# Patient Record
Sex: MALE | Race: WHITE | NOT HISPANIC OR LATINO | URBAN - METROPOLITAN AREA
[De-identification: names, ages, dates, MRNs, and addresses within clinical notes are randomized per-mention and may not be internally consistent; named-entity substitution may affect disease eponyms.]

---

## 2017-03-28 PROBLEM — Z00.00 ENCOUNTER FOR PREVENTIVE HEALTH EXAMINATION: Status: ACTIVE | Noted: 2017-03-28

## 2017-05-05 ENCOUNTER — OUTPATIENT (OUTPATIENT)
Dept: OUTPATIENT SERVICES | Facility: HOSPITAL | Age: 62
LOS: 1 days | Discharge: HOME | End: 2017-05-05

## 2017-05-07 ENCOUNTER — INPATIENT (INPATIENT)
Facility: HOSPITAL | Age: 62
LOS: 11 days | Discharge: NURSING, REHAB OR LONG-TERM CARE | End: 2017-05-19
Attending: INTERNAL MEDICINE | Admitting: INTERNAL MEDICINE
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
## 2017-05-07 DIAGNOSIS — T82.03XA LEAKAGE OF HEART VALVE PROSTHESIS, INITIAL ENCOUNTER: ICD-10-CM

## 2017-06-02 ENCOUNTER — INPATIENT (INPATIENT)
Facility: HOSPITAL | Age: 62
LOS: 5 days | Discharge: HOME HEALTH | DRG: 219 | End: 2017-06-08
Attending: INTERNAL MEDICINE | Admitting: INTERNAL MEDICINE
Payer: MEDICARE

## 2017-06-02 VITALS
TEMPERATURE: 98 F | HEART RATE: 63 BPM | WEIGHT: 142.2 LBS | DIASTOLIC BLOOD PRESSURE: 58 MMHG | SYSTOLIC BLOOD PRESSURE: 106 MMHG | OXYGEN SATURATION: 100 % | RESPIRATION RATE: 18 BRPM

## 2017-06-02 RX ORDER — SODIUM CHLORIDE 9 MG/ML
3 INJECTION INTRAMUSCULAR; INTRAVENOUS; SUBCUTANEOUS EVERY 8 HOURS
Qty: 0 | Refills: 0 | Status: DISCONTINUED | OUTPATIENT
Start: 2017-06-02 | End: 2017-06-06

## 2017-06-03 DIAGNOSIS — Z90.5 ACQUIRED ABSENCE OF KIDNEY: Chronic | ICD-10-CM

## 2017-06-03 DIAGNOSIS — I35.9 NONRHEUMATIC AORTIC VALVE DISORDER, UNSPECIFIED: ICD-10-CM

## 2017-06-03 DIAGNOSIS — Z95.1 PRESENCE OF AORTOCORONARY BYPASS GRAFT: Chronic | ICD-10-CM

## 2017-06-03 DIAGNOSIS — I48.91 UNSPECIFIED ATRIAL FIBRILLATION: ICD-10-CM

## 2017-06-03 DIAGNOSIS — M10.9 GOUT, UNSPECIFIED: ICD-10-CM

## 2017-06-03 DIAGNOSIS — Z90.49 ACQUIRED ABSENCE OF OTHER SPECIFIED PARTS OF DIGESTIVE TRACT: Chronic | ICD-10-CM

## 2017-06-03 DIAGNOSIS — I50.30 UNSPECIFIED DIASTOLIC (CONGESTIVE) HEART FAILURE: ICD-10-CM

## 2017-06-03 DIAGNOSIS — N18.6 END STAGE RENAL DISEASE: ICD-10-CM

## 2017-06-03 DIAGNOSIS — Z95.2 PRESENCE OF PROSTHETIC HEART VALVE: Chronic | ICD-10-CM

## 2017-06-03 DIAGNOSIS — I77.0 ARTERIOVENOUS FISTULA, ACQUIRED: Chronic | ICD-10-CM

## 2017-06-03 DIAGNOSIS — Z98.890 OTHER SPECIFIED POSTPROCEDURAL STATES: Chronic | ICD-10-CM

## 2017-06-03 DIAGNOSIS — D69.6 THROMBOCYTOPENIA, UNSPECIFIED: ICD-10-CM

## 2017-06-03 LAB
ALBUMIN SERPL ELPH-MCNC: 2.9 G/DL — LOW (ref 3.3–5)
ALP SERPL-CCNC: 112 U/L — SIGNIFICANT CHANGE UP (ref 40–120)
ALT FLD-CCNC: 224 U/L — HIGH (ref 10–45)
ANION GAP SERPL CALC-SCNC: 15 MMOL/L — SIGNIFICANT CHANGE UP (ref 5–17)
APTT BLD: 36.4 SEC — SIGNIFICANT CHANGE UP (ref 27.5–37.4)
AST SERPL-CCNC: 186 U/L — HIGH (ref 10–40)
BASOPHILS NFR BLD AUTO: 0 % — SIGNIFICANT CHANGE UP (ref 0–2)
BILIRUB SERPL-MCNC: 1.8 MG/DL — HIGH (ref 0.2–1.2)
BLD GP AB SCN SERPL QL: NEGATIVE — SIGNIFICANT CHANGE UP
BUN SERPL-MCNC: 31 MG/DL — HIGH (ref 7–23)
CALCIUM SERPL-MCNC: 6.5 MG/DL — CRITICAL LOW (ref 8.4–10.5)
CHLORIDE SERPL-SCNC: 98 MMOL/L — SIGNIFICANT CHANGE UP (ref 96–108)
CHOLEST SERPL-MCNC: 105 MG/DL — SIGNIFICANT CHANGE UP (ref 10–199)
CK MB CFR SERPL CALC: 2.4 NG/ML — SIGNIFICANT CHANGE UP (ref 0–6.7)
CK SERPL-CCNC: 40 U/L — SIGNIFICANT CHANGE UP (ref 30–200)
CO2 SERPL-SCNC: 26 MMOL/L — SIGNIFICANT CHANGE UP (ref 22–31)
CREAT SERPL-MCNC: 5.8 MG/DL — HIGH (ref 0.5–1.3)
EOSINOPHIL NFR BLD AUTO: 0.2 % — SIGNIFICANT CHANGE UP (ref 0–6)
GLUCOSE SERPL-MCNC: 95 MG/DL — SIGNIFICANT CHANGE UP (ref 70–99)
HBA1C BLD-MCNC: 4.6 % — SIGNIFICANT CHANGE UP (ref 4–5.6)
HCT VFR BLD CALC: 27.8 % — LOW (ref 39–50)
HCT VFR BLD CALC: 28.7 % — LOW (ref 39–50)
HDLC SERPL-MCNC: 26 MG/DL — LOW (ref 40–125)
HGB BLD-MCNC: 8.8 G/DL — LOW (ref 13–17)
HGB BLD-MCNC: 8.9 G/DL — LOW (ref 13–17)
INR BLD: 1.52 — HIGH (ref 0.88–1.16)
LIPID PNL WITH DIRECT LDL SERPL: 63 MG/DL — SIGNIFICANT CHANGE UP
LYMPHOCYTES # BLD AUTO: 5 % — LOW (ref 13–44)
LYMPHOCYTES # BLD AUTO: 8.3 % — LOW (ref 13–44)
MCHC RBC-ENTMCNC: 30.5 PG — SIGNIFICANT CHANGE UP (ref 27–34)
MCHC RBC-ENTMCNC: 31 G/DL — LOW (ref 32–36)
MCHC RBC-ENTMCNC: 31.3 PG — SIGNIFICANT CHANGE UP (ref 27–34)
MCHC RBC-ENTMCNC: 31.7 G/DL — LOW (ref 32–36)
MCV RBC AUTO: 98.3 FL — SIGNIFICANT CHANGE UP (ref 80–100)
MCV RBC AUTO: 98.9 FL — SIGNIFICANT CHANGE UP (ref 80–100)
MONOCYTES NFR BLD AUTO: 6 % — SIGNIFICANT CHANGE UP (ref 2–14)
MONOCYTES NFR BLD AUTO: 7 % — SIGNIFICANT CHANGE UP (ref 2–14)
NEUTROPHILS NFR BLD AUTO: 83 % — HIGH (ref 43–77)
NEUTROPHILS NFR BLD AUTO: 85.5 % — HIGH (ref 43–77)
NT-PROBNP SERPL-SCNC: HIGH PG/ML (ref 0–300)
PLATELET # BLD AUTO: 36 K/UL — LOW (ref 150–400)
PLATELET # BLD AUTO: 46 K/UL — LOW (ref 150–400)
POTASSIUM SERPL-MCNC: 4 MMOL/L — SIGNIFICANT CHANGE UP (ref 3.5–5.3)
POTASSIUM SERPL-SCNC: 4 MMOL/L — SIGNIFICANT CHANGE UP (ref 3.5–5.3)
PROT SERPL-MCNC: 5.1 G/DL — LOW (ref 6–8.3)
PROTHROM AB SERPL-ACNC: 17 SEC — HIGH (ref 9.8–12.7)
RBC # BLD: 2.81 M/UL — LOW (ref 4.2–5.8)
RBC # BLD: 2.92 M/UL — LOW (ref 4.2–5.8)
RBC # FLD: 20.4 % — HIGH (ref 10.3–16.9)
RBC # FLD: 21.6 % — HIGH (ref 10.3–16.9)
RH IG SCN BLD-IMP: POSITIVE — SIGNIFICANT CHANGE UP
SODIUM SERPL-SCNC: 139 MMOL/L — SIGNIFICANT CHANGE UP (ref 135–145)
TOTAL CHOLESTEROL/HDL RATIO MEASUREMENT: 4 RATIO — SIGNIFICANT CHANGE UP (ref 3.4–9.6)
TRIGL SERPL-MCNC: 79 MG/DL — SIGNIFICANT CHANGE UP (ref 10–149)
TROPONIN T SERPL-MCNC: 0.09 NG/ML — CRITICAL HIGH (ref 0–0.01)
TSH SERPL-MCNC: 1.79 UIU/ML — SIGNIFICANT CHANGE UP (ref 0.35–4.94)
WBC # BLD: 5 K/UL — SIGNIFICANT CHANGE UP (ref 3.8–10.5)
WBC # BLD: 5.3 K/UL — SIGNIFICANT CHANGE UP (ref 3.8–10.5)
WBC # FLD AUTO: 5 K/UL — SIGNIFICANT CHANGE UP (ref 3.8–10.5)
WBC # FLD AUTO: 5.3 K/UL — SIGNIFICANT CHANGE UP (ref 3.8–10.5)

## 2017-06-03 PROCEDURE — 71010: CPT | Mod: 26

## 2017-06-03 PROCEDURE — 99233 SBSQ HOSP IP/OBS HIGH 50: CPT

## 2017-06-03 RX ORDER — MIDODRINE HYDROCHLORIDE 2.5 MG/1
5 TABLET ORAL DAILY
Qty: 0 | Refills: 0 | Status: DISCONTINUED | OUTPATIENT
Start: 2017-06-03 | End: 2017-06-04

## 2017-06-03 RX ORDER — HEPARIN SODIUM 5000 [USP'U]/ML
5000 INJECTION INTRAVENOUS; SUBCUTANEOUS EVERY 8 HOURS
Qty: 0 | Refills: 0 | Status: DISCONTINUED | OUTPATIENT
Start: 2017-06-03 | End: 2017-06-03

## 2017-06-03 RX ORDER — ALPRAZOLAM 0.25 MG
0 TABLET ORAL
Qty: 0 | Refills: 0 | COMMUNITY

## 2017-06-03 RX ORDER — SEVELAMER CARBONATE 2400 MG/1
800 POWDER, FOR SUSPENSION ORAL DAILY
Qty: 0 | Refills: 0 | Status: DISCONTINUED | OUTPATIENT
Start: 2017-06-03 | End: 2017-06-08

## 2017-06-03 RX ORDER — ASPIRIN/CALCIUM CARB/MAGNESIUM 324 MG
81 TABLET ORAL DAILY
Qty: 0 | Refills: 0 | Status: DISCONTINUED | OUTPATIENT
Start: 2017-06-03 | End: 2017-06-03

## 2017-06-03 RX ORDER — TAMSULOSIN HYDROCHLORIDE 0.4 MG/1
0.4 CAPSULE ORAL AT BEDTIME
Qty: 0 | Refills: 0 | Status: DISCONTINUED | OUTPATIENT
Start: 2017-06-03 | End: 2017-06-08

## 2017-06-03 RX ORDER — PANTOPRAZOLE SODIUM 20 MG/1
40 TABLET, DELAYED RELEASE ORAL
Qty: 0 | Refills: 0 | Status: DISCONTINUED | OUTPATIENT
Start: 2017-06-03 | End: 2017-06-06

## 2017-06-03 RX ORDER — CALCIUM ACETATE 667 MG
667 TABLET ORAL
Qty: 0 | Refills: 0 | Status: DISCONTINUED | OUTPATIENT
Start: 2017-06-03 | End: 2017-06-08

## 2017-06-03 RX ORDER — ATORVASTATIN CALCIUM 80 MG/1
40 TABLET, FILM COATED ORAL AT BEDTIME
Qty: 0 | Refills: 0 | Status: DISCONTINUED | OUTPATIENT
Start: 2017-06-03 | End: 2017-06-08

## 2017-06-03 RX ORDER — TAMSULOSIN HYDROCHLORIDE 0.4 MG/1
1 CAPSULE ORAL
Qty: 0 | Refills: 0 | COMMUNITY

## 2017-06-03 RX ORDER — MIRTAZAPINE 45 MG/1
15 TABLET, ORALLY DISINTEGRATING ORAL AT BEDTIME
Qty: 0 | Refills: 0 | Status: DISCONTINUED | OUTPATIENT
Start: 2017-06-03 | End: 2017-06-08

## 2017-06-03 RX ORDER — AMIODARONE HYDROCHLORIDE 400 MG/1
1 TABLET ORAL
Qty: 0 | Refills: 0 | COMMUNITY

## 2017-06-03 RX ADMIN — Medication 1 TABLET(S): at 02:26

## 2017-06-03 RX ADMIN — MIRTAZAPINE 15 MILLIGRAM(S): 45 TABLET, ORALLY DISINTEGRATING ORAL at 22:40

## 2017-06-03 RX ADMIN — Medication 5 MILLIGRAM(S): at 14:36

## 2017-06-03 RX ADMIN — SEVELAMER CARBONATE 800 MILLIGRAM(S): 2400 POWDER, FOR SUSPENSION ORAL at 11:42

## 2017-06-03 RX ADMIN — Medication 667 MILLIGRAM(S): at 18:56

## 2017-06-03 RX ADMIN — Medication 667 MILLIGRAM(S): at 11:42

## 2017-06-03 RX ADMIN — Medication 667 MILLIGRAM(S): at 11:22

## 2017-06-03 RX ADMIN — ATORVASTATIN CALCIUM 40 MILLIGRAM(S): 80 TABLET, FILM COATED ORAL at 22:39

## 2017-06-03 RX ADMIN — MIDODRINE HYDROCHLORIDE 5 MILLIGRAM(S): 2.5 TABLET ORAL at 14:36

## 2017-06-03 RX ADMIN — PANTOPRAZOLE SODIUM 40 MILLIGRAM(S): 20 TABLET, DELAYED RELEASE ORAL at 06:33

## 2017-06-03 RX ADMIN — SODIUM CHLORIDE 3 MILLILITER(S): 9 INJECTION INTRAMUSCULAR; INTRAVENOUS; SUBCUTANEOUS at 13:52

## 2017-06-03 RX ADMIN — Medication 1 TABLET(S): at 11:42

## 2017-06-03 RX ADMIN — TAMSULOSIN HYDROCHLORIDE 0.4 MILLIGRAM(S): 0.4 CAPSULE ORAL at 22:39

## 2017-06-03 NOTE — H&P ADULT - PMH
Aortic valve disease    Atrial fibrillation    CAD (coronary artery disease)    Diastolic CHF    ESRD (end stage renal disease) on dialysis    Gout    HTN (hypertension)

## 2017-06-03 NOTE — PROGRESS NOTE ADULT - ASSESSMENT
Aortic stenosis s/p AVR  CAD with s/p CABG  A-fib with rate control in and out of tachycardiac (100's).  on coumadin outpatient  ESRD on HD (M/W/F)  Diastolic CHF  Gout on Prednisone  HTN This 62 year -old male with PMH significant for ESRD on HD, thrombocytopenia, CAD with s/p CABG and s/p AVR is admitted for aortic paravalvular leakage associated with symptoms of shortness of breath and CHF exacerbation probably associated with his valvular disease involving PVL and MR.  He is anemic and thrombocytopenia probably secondary due to leakage of valve.  During the admission, patient initially presented tachycardiac and hypotensive; resumed midodrine and given once did improve hemodynamically.  Midodrine was then held and planned to only given prior to his dialysis section.  With his thrombocytopenia, patient is not on anticoagulation since admission.  Acute/chronic diastolic/systolic CHF is multi-factorial causes.

## 2017-06-03 NOTE — CONSULT NOTE ADULT - SUBJECTIVE AND OBJECTIVE BOX
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 The patient developed left pleural effusion and he underwent thoracentesis prior to the transfer and required FFP as his INR was over 3.0 on warfarin.. He had DARRIN which revealed worsening valvular disease. The patient has been thrombocytopenic and anemic. He states that he used to receive iron by infusion during dialysis but he was told this was no longer needed. He is not aware of receiving Procrit or Epogen.  He denies blood in urine or stool. No epistaxis or gingival bleeding. He does bruise easily. He states he was waiting on a kidney transplant after his cardiac status improved.  Interval History:  The patient denies chest pain or SOB at rest.  No nausea/vomiting/fevers/chills/night sweats.  Has fatigue, no headaches/dizziness.  Appetite is diminished and he has  weight loss of 50 pounds since his CABG in 2017.  No abdominal pain/diarrhea/constipation.  No melena or hematochezia.    No dysuria/hematuria.  Has history of easy bruising/ no bleeding.  No gingival bleeding or epistaxis.  No leg pain or leg swelling.  ROS is otherwise negative.    PAST MEDICAL & SURGICAL HISTORY:  Diastolic CHF  Gout  Aortic valve disease  Atrial fibrillation  ESRD (end stage renal disease) on dialysis  CAD (coronary artery disease)  HTN (hypertension)  H/O partial nephrectomy  AV fistula  S/P colon resection  S/P AVR  S/P CABG (coronary artery bypass graft)  H/O hernia repair      Allergies:Allergies    No Known Allergies    Intolerances    Medications:MEDICATIONS  (STANDING):  sodium chloride 0.9% lock flush 3milliLiter(s) IV Push every 8 hours  tamsulosin 0.4milliGRAM(s) Oral at bedtime  mirtazapine 15milliGRAM(s) Oral at bedtime  atorvastatin 40milliGRAM(s) Oral at bedtime  calcium acetate 667milliGRAM(s) Oral three times a day with meals  sevelamer hydrochloride 800milliGRAM(s) Oral daily  pantoprazole    Tablet 40milliGRAM(s) Oral before breakfast  multivitamin 1Tablet(s) Oral daily  predniSONE   Tablet 5milliGRAM(s) Oral daily  midodrine 5milliGRAM(s) Oral daily    MEDICATIONS  (PRN):          Social History: does not smoke, drink, or take drugs    FAMILY HISTORY: non- contributory        PHYSICAL EXAM:    T(F): 98.2, Max: 98.7 (-03 @ 05:40)  HR: 64 (60 - 98)  BP: 95/56 (95/56 - 121/59)  RR: 16 (15 - 18)  SpO2: 96% (96% - 100%)  Wt(kg): --    Daily     Daily Weight in k.5 (2017 23:30)    Gen: well developed, thin appearing male, comfortable at bedrest  HEENT: normocephalic/atraumatic, positive conjunctival pallor, no scleral icterus, no oral thrush/mucosal bleeding/mucositis  Neck: supple, no masses, triple lumen catheter inserted left side of neck.  Cardiovascular: RR, nl S1S2, several murmurs, systolic and diastolic  Respiratory: decreased breath sounds left base , bilateral basilar rales  Gastrointestinal: BS+, soft, NT/ND, no masses, no splenomegaly, no hepatomegaly, no evidence for ascites  Extremities: no clubbing/cyanosis, no edema, no calf tenderness  Neurological: no focal deficits  Skin: no rash on visible skin, has excessive ecchymoses on upper extremities; left arm  dialysis fistula, no petechiae, no bleeding from peripheral sites  Lymph Nodes:  no significant peripheral adenopathy   Musculoskeletal:  full ROM  Psychiatric:  mood depressed            Labs:                          8.9    5.0   )-----------( 36       ( 2017 06:21 )             28.7     CBC Full  -  ( 2017 06:21 )  WBC Count : 5.0 K/uL  Hemoglobin : 8.9 g/dL  Hematocrit : 28.7 %  Platelet Count - Automated : 36 K/uL  Mean Cell Volume : 98.3 fL  Mean Cell Hemoglobin : 30.5 pg  Mean Cell Hemoglobin Concentration : 31.0 g/dL  Auto Neutrophil # : x  Auto Lymphocyte # : x  Auto Monocyte # : x  Auto Eosinophil # : x  Auto Basophil # : x  Auto Neutrophil % : 85.5 %  Auto Lymphocyte % : 8.3 %  Auto Monocyte % : 6.0 %  Auto Eosinophil % : 0.2 %  Auto Basophil % : 0.0 %    PT/INR - ( 2017 00:20 )   PT: 17.0 sec;   INR: 1.52          PTT - ( 2017 00:20 )  PTT:36.4 sec        139  |  98  |  31<H>  ----------------------------<  95  4.0   |  26  |  5.80<H>    Ca    6.5<LL>      2017 00:20    TPro  5.1<L>  /  Alb  2.9<L>  /  TBili  1.8<H>  /  DBili  x   /  AST  186<H>  /  ALT  224<H>  /  AlkPhos  112  -03          Other Labs:    Cultures:    Pathology:    Imaging Studies:

## 2017-06-03 NOTE — H&P ADULT - ASSESSMENT
63 y/o male with above listed comorbidities now s/p admission to [facility] for chest discomfort, palpitations, weakness, fatigue and shock s/p pressor requirement, thoracentesis and medical management. He now present to [facility] for his AI and MR for further evaluation and intervention 61 y/o male with above listed comorbidities now s/p admission to [facility] for chest discomfort, palpitations, weakness, fatigue and shock s/p pressor requirement, thoracentesis and medical management. He now present to [facility] for his AI and MR for further evaluation and intervention   imp: shortness of breath 2/2 mixed valvular heart disease -significant aortic PVL and MR; hemolytic anemia likely 2/2 PVL, multifactorial; likely sepsis of unclear source, off abx with improved hemodynamics/labs; transaminitis resolving likely 2/2 sepsis/shock; afib; acute/chronic diastolic/systolic CHF; thrombocytopenia acute/chronic likely mixed etiology; hx of DVT s/p IVCF; deconditioning; depression.

## 2017-06-03 NOTE — PROGRESS NOTE ADULT - SUBJECTIVE AND OBJECTIVE BOX
62 year old male admitted for aortic insufficiency and mitral regurgitation. Possible cardiac revision. Renal consult requested because patient has ESRD and has been [facility] ti[facility] HD for past 6 [facility] [place]  lbs ( 67kg). HD MWF.     PAST MEDICAL & SURGICAL HISTORY:  Diastolic CHF  Gout  Aortic valve disease  Atrial fibrillation  ESRD (end stage renal disease) on dialysis  CAD (coronary artery disease)  HTN (hypertension)  H/O partial nephrectomy  AV fistula  S/P colon resection  S/P AVR  S/P CABG (coronary artery bypass graft)  H/O hernia repair    MEDICATIONS  (STANDING):  sodium chloride 0.9% lock flush 3milliLiter(s) IV Push every 8 hours  tamsulosin 0.4milliGRAM(s) Oral at bedtime  mirtazapine 15milliGRAM(s) Oral at bedtime  atorvastatin 40milliGRAM(s) Oral at bedtime  calcium acetate 667milliGRAM(s) Oral three times a day with meals  sevelamer hydrochloride 800milliGRAM(s) Oral daily  pantoprazole    Tablet 40milliGRAM(s) Oral before breakfast  multivitamin 1Tablet(s) Oral daily  predniSONE   Tablet 5milliGRAM(s) Oral daily  midodrine 5milliGRAM(s) Oral daily    MEDICATIONS  (PRN):  Vital Signs Last 24 Hrs  T(C): 36.8, Max: 37.1 (06-03 @ 05:40)  T(F): 98.3, Max: 98.7 (06-03 @ 05:40)  HR: 74 (60 - 98)  BP: 114/55 (106/53 - 121/59)  BP(mean): 79 (74 - 83)  RR: 15 (15 - 18)  SpO2: 96% (96% - 100%)  awake, alert  chest clear  heart S1, S2  abd-benign  ext-LLA AVF                          8.9    5.0   )-----------( 36       ( 03 Jun 2017 06:21 )             28.7     06-03    139  |  98  |  31<H>  ----------------------------<  95  4.0   |  26  |  5.80<H>    Ca    6.5<LL>      03 Jun 2017 00:20    TPro  5.1<L>  /  Alb  2.9<L>  /  TBili  1.8<H>  /  DBili  x   /  AST  186<H>  /  ALT  224<H>  /  AlkPhos  112  06-03

## 2017-06-03 NOTE — H&P ADULT - PROBLEM SELECTOR PLAN 1
1. Admit to [facility], HD stable. f/u all labs, CXR and EKG  2. restart home meds  3. f/u need for repeat TTE/DARRIN   4. Nephrology consult for HD -Admit to [facility], HD stable. f/u all labs including hemolysis labs, CXR and EKG  -resume midodrine if SBP <100mmHg  -repeat TTE at [facility]  -consider for PVL aortic closure; CV surgical evaluation - Mitraclip vs minimally invasive MVR staged; preoperative PFTs/carotid u/s  -hematology evaluation  -renal evaluation; HD  -PT/nutrition; t/c psych consultation

## 2017-06-03 NOTE — CONSULT NOTE ADULT - ASSESSMENT
62 year old male with CAD, Valvular disease ,s/p CABG/AVR, atrial fibrillation, IgA nephropathy, s/p nephrectomy and partial nephrectomy, ESRD on hemodialysis, hypertension, gout, arthritis, recent cardiac decompensation/arrest requiring pressor support, left pleural effusion, transferred to [facility] from [facility] for further evaluation and management of valvular/cardiac disease, found to have anemia, worsening thrombocytopenia and coagulopathy.  Anemia -multifactorial etiologies - Chronic renal failure, hemolytic anemia destruction from heart valves, possible occult blood loss while anticoagulated, iron deficiency and other nutritional deficiencies,   Thrombocytopenia - destruction of platelets from valvular disease, medications, dialysis, possible immune related cause  Qualitative platelet disorder due to aspirin, renal failure  Coagulopathy - due to warfarin, nutritional deficiency, ? lupus anticoagulant.  Plan: If possible, avoid platelet suppressing medications, check iron panel, ferritin, B12, folate levels, erythropoietin level, platelet antibody panel , JASS, lupus anticoagulant, cardiolipin and B2GPI antibody panels, send heparin antibody studies, guaiac stool. Will follow.

## 2017-06-03 NOTE — CONSULT NOTE ADULT - CONSULT REASON
62 year old male with CAD, s/p CABG/AVR, ESRD on dialysis, atrial fibrillation ,CHF, s/p cardiac arrest , transferred to [facility] for further management and possible cardiac  intervention. Consult called for anemia, worsening thrombocytopenia, coagulopathy.

## 2017-06-03 NOTE — H&P ADULT - NSHPPHYSICALEXAM_GEN_ALL_CORE
CONSTITUTIONAL:                                                                          WNL  NEURO:                                                                                             WNL                      EYES:                                                                                                  WNL  ENMT:                                                                                                WNL  CV:                                                                                                      WNL  RESPIRATORY:                                                                                  WNL  GI:                                                                                                       WNL  : SANCHEZ + / -                                                                                 WNL  MUSKULOSKELETAL:                                                                       WNL  SKIN / BREAST:                                                                                 WNL  Vascular  Extremities CONSTITUTIONAL:  A&O x3, no focal deficits, below expected body weight  NEURO: A&O x3, no focal deficits                    EYES: EOMI, PERRLA  ENMT:   central line L neck, no JVD  CV: RRR, + diastolic murmur III/VI  RESPIRATORY: CTA b/l  GI: + BS, soft, nontender  : no acevedo  MUSKULOSKELETAL: FROM b/l, no joint swelling  SKIN / BREAST: healed mid sternotomy scar  Vascular: + pulses b/l  Extremities: no edema, L forearm fistula

## 2017-06-03 NOTE — H&P ADULT - HISTORY OF PRESENT ILLNESS
61 y/o male with hx of ESRD on HD ( M/W/F) via L AV fistula secondary to IGA nephropathy s/p right nephrectomy and left partial nephrectomy, diastolic CHF, JADA, gout, HTN, CAD s/p CABG (2/2017),  AVR (bioprosthetic), hx IVC filter, hx hernia repair, sigmoid colectomy secondary to diverticulum and perforation s/p reversal, Afib (on coumadin) presented to [facility] on 5/28 with chest discomfort, palpitations, weakness and fatigue of 1 day duration. H 61 y/o male with hx of ESRD on HD ( M/W/F) via L AV fistula secondary to IGA nephropathy s/p right nephrectomy and left partial nephrectomy, diastolic CHF, JADA, gout, HTN, CAD s/p CABG (2/2017),  AVR (bioprosthetic), hx IVC filter, hx hernia repair, sigmoid colectomy secondary to diverticulum and perforation s/p reversal, Afib (on coumadin) presented to [facility] on 5/28 with chest discomfort, palpitations, weakness and fatigue of 1 day duration along with depressed mood and anxiety. He was recently admitted with similar complaint and was complicated by PEA cardiac arrest. On admission to [facility] he was found to be in SB (50's) with a BP of 74/39. He was admitted to the CCU for treatment. He required pressor support, was found to have transaminitis, a WC of 14, and L pleural effusion. A abdominal US was performed showing patent hepatic portal veins. He had a thoracentesis after FFP ( for INR 3.5) draining 1L of transudative fluid (culture negative). Echo was done showing an EF of 50%, 2+ aortic regurgitation, 3+ MR with normal valve structure, dilated RV with decreased systolic function, high PA systolic pressures and 1-2+ TR. Patient was medically managed and was weaned off pressor support at [facility]. He is now transferred to [facility] for further evaluation and possible structural heart intervention.     Of note patient also had a negative doppler study for r/o DVT, has chronic thrombocytopenia on documentation. 63 y/o male with hx of ESRD on HD ( M/W/F) via L AV fistula secondary to IGA nephropathy s/p right nephrectomy and left partial nephrectomy, diastolic CHF, JADA, gout, HTN, CAD s/p CABG/AVR (2/2017), hx IVC filter, hx hernia repair, sigmoid colectomy secondary to diverticulum and perforation s/p reversal, Afib (on coumadin) presented to [facility] on 5/28 with chest discomfort, palpitations, weakness and fatigue of 1 day duration along with depressed mood and anxiety. He was recently admitted with similar complaint and was complicated by PEA cardiac arrest. On admission to [facility] he was found to be in SB (50's) with a BP of 74/39. He was admitted to the CCU for treatment. He required pressor support, was found to have transaminitis, a WC of 14, and L pleural effusion. A abdominal US was performed showing patent hepatic portal veins. He had a thoracentesis after FFP ( for INR 3.5) draining 1L of transudative fluid (culture negative). Echo was done showing an EF of 50%, 2+ aortic regurgitation, 3+ MR with normal valve structure, dilated RV with decreased systolic function, high PA systolic pressures and 1-2+ TR. Patient was medically managed and was weaned off pressor support at [facility]. He is now transferred to [facility] for further evaluation and possible structural heart intervention.     Of note patient also had a negative doppler study for r/o DVT, has chronic thrombocytopenia on documentation. He was recently admitted to [facility] for similar symptoms and was discharged 2 days prior to readmission. 63 y/o male with hx of ESRD on HD ( M/W/F) via L AV fistula secondary to IGA nephropathy s/p right nephrectomy and left partial nephrectomy, diastolic CHF, JADA, gout, HTN, CAD s/p CABG/AVR (2/2017), hx IVC filter, hx hernia repair, sigmoid colectomy secondary to diverticulum and perforation s/p reversal, Afib (on coumadin) presented to [facility] on 5/28 with chest discomfort, palpitations, weakness and fatigue of 1 day duration along with depressed mood and anxiety. He was recently admitted with similar complaint and was complicated by PEA cardiac arrest. On admission to [facility] he was found to be in SB (50's) with a BP of 74/39. He was admitted to the CCU for treatment. He required pressor support, was found to have transaminitis, a WC of 14, and L pleural effusion. A abdominal US was performed showing patent hepatic portal veins. He had a thoracentesis after FFP ( for INR 3.5) draining 1L of transudative fluid (culture negative). Echo was done showing an EF of 50%, 2+ aortic regurgitation, 3+ MR with normal valve structure, dilated RV with decreased systolic function, high PA systolic pressures and 1-2+ TR. Patient was medically managed and was weaned off pressor support at [facility]. He is now transferred to [facility] for further evaluation and possible structural heart intervention. He currently denies any CP, palpitations, SOB, cough, N/V, F/C, dizziness, lightheadedness.    Of note patient also had a negative doppler study for r/o DVT, has chronic thrombocytopenia on documentation. He was recently admitted to [facility] for similar symptoms and was discharged 2 days prior to readmission. 63 y/o male with hx of ESRD on HD ( M/W/F) via L AV fistula secondary to IGA nephropathy s/p right nephrectomy and left partial nephrectomy, diastolic CHF, JADA, gout, HTN, CAD s/p CABG/AVR (2/2017), hx IVC filter, hx hernia repair, sigmoid colectomy secondary to diverticulum and perforation s/p reversal, Afib (on coumadin) presented to [facility] on 5/28 with chest discomfort, palpitations, weakness and fatigue of 1 day duration along with depressed mood and anxiety. He was recently admitted with similar complaint and was complicated by PEA cardiac arrest. On admission to [facility] he was found to be in SB (50's) with a BP of 74/39. He was admitted to the CCU for treatment. He required pressor support, was found to have transaminitis, a WC of 14, and L pleural effusion. A abdominal US was performed showing patent hepatic portal veins. He had a thoracentesis after FFP ( for INR 3.5) draining 1L of transudative fluid (culture negative). Echo was done showing an EF of 50%, 2+ aortic regurgitation, 3+ MR, dilated RV with decreased systolic function, high PA systolic pressures and 1-2+ TR. Patient was medically managed and was weaned off pressor support at [facility]. He is now transferred to [facility] for further evaluation and possible structural heart intervention. He currently denies any CP, palpitations, SOB, cough, N/V, F/C, dizziness, lightheadedness.    Of note patient also had a negative doppler study for r/o DVT, has chronic thrombocytopenia on documentation. He was recently admitted to [facility] for similar symptoms and was discharged 2 days prior to readmission.

## 2017-06-03 NOTE — H&P ADULT - NSHPREVIEWOFSYSTEMS_GEN_ALL_CORE
Review of Systems  CONSTITUTIONAL:  Denies Fevers / chills, sweats, fatigue, weight loss, weight gain                                      NEURO:  Denies parathesias, seizures, syncope, confusion                                                                                EYES:  Denies Blurry vision, discharge, pain, loss of vision                                                                                    ENMT:  Denies Difficulty hearing, vertigo, dysphagia, epistaxis, recent dental work                                       CV:  Denies Chest pain, palpitations, DELEON, orthopnea                                                                                          RESPIRATORY:  Denies Wheezing, SOB, cough / sputum, hemoptysis                                                                GI:  Denies Nausea, vommiting, diarrhea, constipation, melena, difficulty swallowing                                               : Denies Hematuria, dysuria, urgency, incontinence                                                                                         MUSKULOSKELETAL:  Denies arthritis, joint swelling, muscle weakness                                                             SKIN/BREAST:  Denies rash, itching, carolann loss, masses                                                                                            PSYCH:  Denies depresion, anxiety, suicidal ideation                                                                                               HEME/LYMPH:  Denies bruises easily, enlarged lymph nodes, tender lymph nodes                                        ENDOCRINE:  Denies cold intolerance, heat intolerance, polydipsia Review of Systems  CONSTITUTIONAL:  Denies Fevers / chills, sweats, fatigue, weight loss, weight gain                                      NEURO:  Denies parathesias, seizures, syncope, confusion                                                                                EYES:  Denies Blurry vision, discharge, pain, loss of vision                                                                                    ENMT:  Denies Difficulty hearing, vertigo, dysphagia, epistaxis, recent dental work                                       CV:  Chest discomfort, palpitations, denies DELEON, orthopnea                                                                                          RESPIRATORY:  Denies Wheezing, SOB, cough / sputum, hemoptysis                                                                GI:  Denies Nausea, vommiting, diarrhea, constipation, melena, difficulty swallowing                                               : Denies Hematuria, dysuria, urgency, incontinence                                                                                         MUSKULOSKELETAL:  Denies arthritis, joint swelling, muscle weakness                                                             SKIN/BREAST:  Denies rash, itching, carolann loss, masses                                                                                            PSYCH:  Denies depresion, anxiety, suicidal ideation                                                                                               HEME/LYMPH:  Denies bruises easily, enlarged lymph nodes, tender lymph nodes                                        ENDOCRINE:  Denies cold intolerance, heat intolerance, polydipsia

## 2017-06-03 NOTE — H&P ADULT - PSH
AV fistula    H/O hernia repair    H/O partial nephrectomy    S/P AVR    S/P CABG (coronary artery bypass graft)    S/P colon resection

## 2017-06-03 NOTE — PROGRESS NOTE ADULT - ASSESSMENT
ESRD  ASHD  MR/AI    Plan:    HD MWF  cardiac evaluation  possible valve surgery  supportive care    ERENDIRA Roche MD

## 2017-06-03 NOTE — CONSULT NOTE ADULT - PROBLEM SELECTOR RECOMMENDATION 9
electrolytes and volume status acceptable - no indication for acute hd.   anticipate next hd to be on 6/5/17

## 2017-06-03 NOTE — PROGRESS NOTE ADULT - SUBJECTIVE AND OBJECTIVE BOX
Patient discussed on morning rounds with       Operation / Date:     SUBJECTIVE ASSESSMENT:  62y Male         Vital Signs Last 24 Hrs  T(C): 36.9, Max: 37.1 (06-03 @ 05:40)  T(F): 98.4, Max: 98.7 (06-03 @ 05:40)  HR: 64 (60 - 98)  BP: 108/55 (106/53 - 121/59)  BP(mean): 78 (74 - 83)  RR: 16 (15 - 18)  SpO2: 96% (96% - 100%)  I&O's Detail    I & Os for current day (as of 03 Jun 2017 19:09)  =============================================  IN:    Oral Fluid: 230 ml    Total IN: 230 ml  ---------------------------------------------  OUT:    Total OUT: 0 ml  ---------------------------------------------  Total NET: 230 ml      CHEST TUBE:  Yes/No. AIR LEAKS: Yes/No. Suction / H2O SEAL.   MARK DRAIN:  Yes/No.  EPICARDIAL WIRES: Yes/No.  TIE DOWNS: Yes/No.  SANCHEZ: Yes/No.    PHYSICAL EXAM:    General: NAD.  feeling much better this afternoon    Neurological: grossly intact    Cardiovascular: tachy in the morning and normal rate in the afternoon.      Respiratory: slight wheezing.      Gastrointestinal: BM and BS are intact    Extremities: no edema.  refuse teds    Vascular: pulses are intact    LABS:                        8.9    5.0   )-----------( 36       ( 03 Jun 2017 06:21 )             28.7       COUMADIN: not at this admission.  Patient was on coumadin due to a-fib    PT/INR - ( 03 Jun 2017 00:20 )   PT: 17.0 sec;   INR: 1.52          PTT - ( 03 Jun 2017 00:20 )  PTT: 36.4 sec    06-03    139  |  98  |  31<H>  ----------------------------<  95  4.0   |  26  |  5.80<H>    Ca    6.5<LL>      03 Jun 2017 00:20    TPro  5.1<L>  /  Alb  2.9<L>  /  TBili  1.8<H>  /  DBili  x   /  AST  186<H>  /  ALT  224<H>  /  AlkPhos  112  06-03    MEDICATIONS  (STANDING):  sodium chloride 0.9% lock flush 3milliLiter(s) IV Push every 8 hours  tamsulosin 0.4milliGRAM(s) Oral at bedtime  mirtazapine 15milliGRAM(s) Oral at bedtime  atorvastatin 40milliGRAM(s) Oral at bedtime  calcium acetate 667milliGRAM(s) Oral three times a day with meals  sevelamer hydrochloride 800milliGRAM(s) Oral daily  pantoprazole    Tablet 40milliGRAM(s) Oral before breakfast  multivitamin 1Tablet(s) Oral daily  predniSONE   Tablet 5milliGRAM(s) Oral daily  midodrine 5milliGRAM(s) Oral daily    MEDICATIONS  (PRN):    RADIOLOGY & ADDITIONAL TESTS: Patient discussed on morning rounds with Dr. Lisa and Jeanette      Admission for Aortic valvular disease.    SUBJECTIVE ASSESSMENT:  62y Male is transferred from [facility] with complains of SOB and fluid overload and findings of para-valvular leakage.  With his multiple medical history, patient is required for further work          Vital Signs Last 24 Hrs  T(C): 36.9, Max: 37.1 (06-03 @ 05:40)  T(F): 98.4, Max: 98.7 (06-03 @ 05:40)  HR: 64 (60 - 98)  BP: 108/55 (106/53 - 121/59)  BP(mean): 78 (74 - 83)  RR: 16 (15 - 18)  SpO2: 96% (96% - 100%)  I&O's Detail    I & Os for current day (as of 03 Jun 2017 19:09)  =============================================  IN:    Oral Fluid: 230 ml    Total IN: 230 ml  ---------------------------------------------  OUT:    Total OUT: 0 ml  ---------------------------------------------  Total NET: 230 ml    PHYSICAL EXAM:    General: NAD.  feeling much better this afternoon    Neurological: grossly intact    Cardiovascular: tachy in the morning and normal rate in the afternoon.      Respiratory: slight wheezing.      Gastrointestinal: BM and BS are intact    Extremities: no edema.  refuse teds    Vascular: pulses are intact    LABS:                        8.9    5.0   )-----------( 36       ( 03 Jun 2017 06:21 )             28.7       COUMADIN: not at this admission.  Patient was on coumadin due to a-fib    PT/INR - ( 03 Jun 2017 00:20 )   PT: 17.0 sec;   INR: 1.52          PTT - ( 03 Jun 2017 00:20 )  PTT: 36.4 sec    06-03    139  |  98  |  31<H>  ----------------------------<  95  4.0   |  26  |  5.80<H>    Ca    6.5<LL>      03 Jun 2017 00:20    TPro  5.1<L>  /  Alb  2.9<L>  /  TBili  1.8<H>  /  DBili  x   /  AST  186<H>  /  ALT  224<H>  /  AlkPhos  112  06-03    MEDICATIONS  (STANDING):  sodium chloride 0.9% lock flush 3milliLiter(s) IV Push every 8 hours  tamsulosin 0.4milliGRAM(s) Oral at bedtime  mirtazapine 15milliGRAM(s) Oral at bedtime  atorvastatin 40milliGRAM(s) Oral at bedtime  calcium acetate 667milliGRAM(s) Oral three times a day with meals  sevelamer hydrochloride 800milliGRAM(s) Oral daily  pantoprazole    Tablet 40milliGRAM(s) Oral before breakfast  multivitamin 1Tablet(s) Oral daily  predniSONE   Tablet 5milliGRAM(s) Oral daily  midodrine 5milliGRAM(s) Oral daily    MEDICATIONS  (PRN):    RADIOLOGY & ADDITIONAL TESTS: Patient discussed on morning rounds with Dr. Lisa and Jeanette      Admission for Aortic valvular disease.    SUBJECTIVE ASSESSMENT:  62y Male is transferred from [facility] with complains of SOB and fluid overload and findings of para-valvular leakage.  With his multiple medical history, patient is required for medical optimization at this admission prior to his PVL closure.      Vital Signs Last 24 Hrs  T(C): 36.9, Max: 37.1 (06-03 @ 05:40)  T(F): 98.4, Max: 98.7 (06-03 @ 05:40)  HR: 64 (60 - 98)  BP: 108/55 (106/53 - 121/59)  BP(mean): 78 (74 - 83)  RR: 16 (15 - 18)  SpO2: 96% (96% - 100%)  I&O's Detail    I & Os for current day (as of 03 Jun 2017 19:09)  =============================================  IN:    Oral Fluid: 230 ml    Total IN: 230 ml  ---------------------------------------------  OUT:    Total OUT: 0 ml  ---------------------------------------------  Total NET: 230 ml    PHYSICAL EXAM:    General: NAD.  feeling much better this afternoon    Neurological: grossly intact    Cardiovascular: tachy in the morning and normal rate in the afternoon.      Respiratory: slight wheezing.      Gastrointestinal: BM and BS are intact    Extremities: no edema.  refuse teds    Vascular: pulses are intact    LABS:                        8.9    5.0   )-----------( 36       ( 03 Jun 2017 06:21 )             28.7       COUMADIN: not at this admission.  Patient was on coumadin due to a-fib    PT/INR - ( 03 Jun 2017 00:20 )   PT: 17.0 sec;   INR: 1.52          PTT - ( 03 Jun 2017 00:20 )  PTT: 36.4 sec    06-03    139  |  98  |  31<H>  ----------------------------<  95  4.0   |  26  |  5.80<H>    Ca    6.5<LL>      03 Jun 2017 00:20    TPro  5.1<L>  /  Alb  2.9<L>  /  TBili  1.8<H>  /  DBili  x   /  AST  186<H>  /  ALT  224<H>  /  AlkPhos  112  06-03    MEDICATIONS  (STANDING):  sodium chloride 0.9% lock flush 3milliLiter(s) IV Push every 8 hours  tamsulosin 0.4milliGRAM(s) Oral at bedtime  mirtazapine 15milliGRAM(s) Oral at bedtime  atorvastatin 40milliGRAM(s) Oral at bedtime  calcium acetate 667milliGRAM(s) Oral three times a day with meals  sevelamer hydrochloride 800milliGRAM(s) Oral daily  pantoprazole    Tablet 40milliGRAM(s) Oral before breakfast  multivitamin 1Tablet(s) Oral daily  predniSONE   Tablet 5milliGRAM(s) Oral daily  midodrine 5milliGRAM(s) Oral daily    MEDICATIONS  (PRN):    RADIOLOGY & ADDITIONAL TESTS:

## 2017-06-03 NOTE — CONSULT NOTE ADULT - SUBJECTIVE AND OBJECTIVE BOX
Patient is a 62y Male admitted for aortic insuficiency and MR. nevarez is on esrd mwf, for past 6 years under the care of dr adriano serna at [facility] dialysis in [facility]. pt notes had last hd on friday, feels well, no sob, sitting up in room air.  notes edw to be 143 lbs ( 67kg).     PAST MEDICAL & SURGICAL HISTORY:  Diastolic CHF  Gout  Aortic valve disease  Atrial fibrillation  ESRD (end stage renal disease) on dialysis  CAD (coronary artery disease)  HTN (hypertension)  H/O partial nephrectomy  AV fistula  S/P colon resection  S/P AVR  S/P CABG (coronary artery bypass graft)  H/O hernia repair      MEDICATIONS  (STANDING):  sodium chloride 0.9% lock flush 3milliLiter(s) IV Push every 8 hours  tamsulosin 0.4milliGRAM(s) Oral at bedtime  mirtazapine 15milliGRAM(s) Oral at bedtime  atorvastatin 40milliGRAM(s) Oral at bedtime  calcium acetate 667milliGRAM(s) Oral three times a day with meals  sevelamer hydrochloride 800milliGRAM(s) Oral daily  pantoprazole    Tablet 40milliGRAM(s) Oral before breakfast  multivitamin 1Tablet(s) Oral daily    MEDICATIONS  (PRN):      Allergies    No Known Allergies    Intolerances        SOCIAL HISTORY:    FAMILY HISTORY:      T(C): , Max: 37.1 (06-03 @ 05:40)  T(F): , Max: 98.7 (06-03 @ 05:40)  HR: 98  BP: 121/59  BP(mean): 83  RR: 16  SpO2: 99%  Wt(kg): --    Height (cm): 182.9 (06-03 @ 00:44)  Weight (kg): 64.4 (06-03 @ 00:44)  BMI (kg/m2): 19.3 (06-03 @ 00:44)  BSA (m2): 1.84 (06-03 @ 00:44)    PHYSICAL EXAM:  Constitutional: Well appearning.  No acute distress  ENMT: Moist mucous membrane.  No cyanosis.  Neck: Supple. No JVD.  Back: No CVA tenderness  Respiratory: Clear to auscultation   Cardiovascular: S1, S2.  Regular rate and rhythm.    Gastrointestinal: soft, non-tender, non-distended, normal bowel sounds  Extremities: Warm.  No lower extremity edema.      ACCESS: avf    LABS:                        8.9    5.0   )-----------( 36       ( 03 Jun 2017 06:21 )             28.7     06-03    139  |  98  |  31<H>  ----------------------------<  95  4.0   |  26  |  5.80<H>    Ca    6.5<LL>      03 Jun 2017 00:20    TPro  5.1<L>  /  Alb  2.9<L>  /  TBili  1.8<H>  /  DBili  x   /  AST  186<H>  /  ALT  224<H>  /  AlkPhos  112  06-03    Hemoglobin A1C, Whole Blood: 4.6 % [4.0 - 5.6] (06-03 @ 00:20)  Cholesterol, Serum: 105 mg/dL [10 - 199] (06-03 @ 00:20)    PT/INR - ( 03 Jun 2017 00:20 )   PT: 17.0 sec;   INR: 1.52          PTT - ( 03 Jun 2017 00:20 )  PTT:36.4 sec          RADIOLOGY & ADDITIONAL STUDIES:

## 2017-06-04 DIAGNOSIS — N18.6 END STAGE RENAL DISEASE: ICD-10-CM

## 2017-06-04 LAB
ANION GAP SERPL CALC-SCNC: 15 MMOL/L — SIGNIFICANT CHANGE UP (ref 5–17)
APTT BLD: 33.3 SEC — SIGNIFICANT CHANGE UP (ref 27.5–37.4)
BUN SERPL-MCNC: 44 MG/DL — HIGH (ref 7–23)
CALCIUM SERPL-MCNC: 7 MG/DL — LOW (ref 8.4–10.5)
CHLORIDE SERPL-SCNC: 97 MMOL/L — SIGNIFICANT CHANGE UP (ref 96–108)
CO2 SERPL-SCNC: 27 MMOL/L — SIGNIFICANT CHANGE UP (ref 22–31)
CREAT SERPL-MCNC: 7.2 MG/DL — HIGH (ref 0.5–1.3)
GLUCOSE SERPL-MCNC: 81 MG/DL — SIGNIFICANT CHANGE UP (ref 70–99)
HCT VFR BLD CALC: 27.8 % — LOW (ref 39–50)
HGB BLD-MCNC: 8.6 G/DL — LOW (ref 13–17)
INR BLD: 1.25 — HIGH (ref 0.88–1.16)
LDH SERPL L TO P-CCNC: 554 U/L — HIGH (ref 50–242)
MCHC RBC-ENTMCNC: 30.7 PG — SIGNIFICANT CHANGE UP (ref 27–34)
MCHC RBC-ENTMCNC: 30.9 G/DL — LOW (ref 32–36)
MCV RBC AUTO: 99.3 FL — SIGNIFICANT CHANGE UP (ref 80–100)
PLATELET # BLD AUTO: 44 K/UL — LOW (ref 150–400)
POTASSIUM SERPL-MCNC: 4.4 MMOL/L — SIGNIFICANT CHANGE UP (ref 3.5–5.3)
POTASSIUM SERPL-SCNC: 4.4 MMOL/L — SIGNIFICANT CHANGE UP (ref 3.5–5.3)
PROTHROM AB SERPL-ACNC: 13.9 SEC — HIGH (ref 9.8–12.7)
RBC # BLD: 2.8 M/UL — LOW (ref 4.2–5.8)
RBC # BLD: 2.8 M/UL — LOW (ref 4.2–5.8)
RBC # FLD: 22 % — HIGH (ref 10.3–16.9)
RETICS/RBC NFR: 2.5 % — SIGNIFICANT CHANGE UP (ref 0.5–2.5)
SODIUM SERPL-SCNC: 139 MMOL/L — SIGNIFICANT CHANGE UP (ref 135–145)
WBC # BLD: 4.5 K/UL — SIGNIFICANT CHANGE UP (ref 3.8–10.5)
WBC # FLD AUTO: 4.5 K/UL — SIGNIFICANT CHANGE UP (ref 3.8–10.5)

## 2017-06-04 PROCEDURE — 99233 SBSQ HOSP IP/OBS HIGH 50: CPT

## 2017-06-04 RX ORDER — MIDODRINE HYDROCHLORIDE 2.5 MG/1
5 TABLET ORAL EVERY OTHER DAY
Qty: 0 | Refills: 0 | Status: DISCONTINUED | OUTPATIENT
Start: 2017-06-04 | End: 2017-06-06

## 2017-06-04 RX ADMIN — PANTOPRAZOLE SODIUM 40 MILLIGRAM(S): 20 TABLET, DELAYED RELEASE ORAL at 05:27

## 2017-06-04 RX ADMIN — SODIUM CHLORIDE 3 MILLILITER(S): 9 INJECTION INTRAMUSCULAR; INTRAVENOUS; SUBCUTANEOUS at 14:50

## 2017-06-04 RX ADMIN — TAMSULOSIN HYDROCHLORIDE 0.4 MILLIGRAM(S): 0.4 CAPSULE ORAL at 21:43

## 2017-06-04 RX ADMIN — Medication 667 MILLIGRAM(S): at 17:11

## 2017-06-04 RX ADMIN — SODIUM CHLORIDE 3 MILLILITER(S): 9 INJECTION INTRAMUSCULAR; INTRAVENOUS; SUBCUTANEOUS at 21:45

## 2017-06-04 RX ADMIN — SODIUM CHLORIDE 3 MILLILITER(S): 9 INJECTION INTRAMUSCULAR; INTRAVENOUS; SUBCUTANEOUS at 00:36

## 2017-06-04 RX ADMIN — Medication 667 MILLIGRAM(S): at 12:16

## 2017-06-04 RX ADMIN — MIRTAZAPINE 15 MILLIGRAM(S): 45 TABLET, ORALLY DISINTEGRATING ORAL at 21:43

## 2017-06-04 RX ADMIN — Medication 667 MILLIGRAM(S): at 07:27

## 2017-06-04 RX ADMIN — SEVELAMER CARBONATE 800 MILLIGRAM(S): 2400 POWDER, FOR SUSPENSION ORAL at 12:16

## 2017-06-04 RX ADMIN — ATORVASTATIN CALCIUM 40 MILLIGRAM(S): 80 TABLET, FILM COATED ORAL at 21:43

## 2017-06-04 RX ADMIN — Medication 5 MILLIGRAM(S): at 05:27

## 2017-06-04 RX ADMIN — SODIUM CHLORIDE 3 MILLILITER(S): 9 INJECTION INTRAMUSCULAR; INTRAVENOUS; SUBCUTANEOUS at 06:44

## 2017-06-04 RX ADMIN — Medication 1 TABLET(S): at 12:16

## 2017-06-04 NOTE — PHYSICAL THERAPY INITIAL EVALUATION ADULT - ADDITIONAL COMMENTS
1 step to enter home no handrails, patient states one fall in last 6 months, states he has a cane at home tolerating indoor distances prior to admission.

## 2017-06-04 NOTE — PHYSICAL THERAPY INITIAL EVALUATION ADULT - PERTINENT HX OF CURRENT PROBLEM, REHAB EVAL
61 yo male admitted from [facility] for complaints of SOB, found to have a paravalvular Leak, preop for PVL repair

## 2017-06-04 NOTE — PROGRESS NOTE ADULT - ATTENDING COMMENTS
Thrombocytopenia likely multifactorial in etiology, eg. destruction of platelets related to valvular disease, hemodialysis, medications and posible immune related. Evaluation as per Dr. Frankel.

## 2017-06-04 NOTE — PROGRESS NOTE ADULT - ASSESSMENT
Aortic paravalvular leakage (admitted for possible closure)  ESRD on HD  thrombocytopenia (hematology service to follow)  CAD s/p CABG  diastolic CHF  JADA This 62 year -old male with PMH significant for ESRD on HD, thrombocytopenia, CAD with s/p CABG and s/p AVR is admitted for aortic paravalvular leakage associated with symptoms of shortness of breath and CHF exacerbation.  He is under evaluation for PVL closure.  During the admission, patient initially presented tachycardiac and hypotensive; resumed midodrine and given once did correct him hemodynamically.  Midodrine was then held and planned to only given prior to his dialysis section. This 62 year -old male with PMH significant for ESRD on HD, thrombocytopenia, CAD with s/p CABG and s/p AVR is admitted for aortic paravalvular leakage associated with symptoms of shortness of breath and CHF exacerbation probably associated with his valvular disease involving PVL and MR.  He is anemic and thrombocytopenia probably secondary due to leakage of valve.  During the admission, patient initially presented tachycardiac and hypotensive; resumed midodrine and given once did improve hemodynamically.  Midodrine was then held and planned to only given prior to his dialysis section.  With his thrombocytopenia, patient is not on anticoagulation since admission.  Acute/chronic diastolic/systolic CHF is multi-factorial causes.

## 2017-06-04 NOTE — PROGRESS NOTE ADULT - SUBJECTIVE AND OBJECTIVE BOX
Patient discussed on morning rounds with       Operation / Date:     SUBJECTIVE ASSESSMENT:  62y Male         Vital Signs Last 24 Hrs  T(C): 36.4, Max: 36.9 (06-03 @ 17:11)  T(F): 97.5, Max: 98.5 (06-04 @ 09:09)  HR: 80 (64 - 80)  BP: 117/89 (85/50 - 136/63)  BP(mean): 100 (63 - 100)  RR: 20 (16 - 20)  SpO2: 94% (94% - 98%)  I&O's Detail  I & Os for 24h ending 04 Jun 2017 07:00  =============================================  IN:    Oral Fluid: 230 ml    Total IN: 230 ml  ---------------------------------------------  OUT:    Total OUT: 0 ml  ---------------------------------------------  Total NET: 230 ml    I & Os for current day (as of 04 Jun 2017 14:35)  =============================================  IN:    Oral Fluid: 480 ml    Total IN: 480 ml  ---------------------------------------------  OUT:    Total OUT: 0 ml  ---------------------------------------------  Total NET: 480 ml      CHEST TUBE:  Yes/No. AIR LEAKS: Yes/No. Suction / H2O SEAL.   MARK DRAIN:  Yes/No.  EPICARDIAL WIRES: Yes/No.  TIE DOWNS: Yes/No.  SANCHEZ: Yes/No.    PHYSICAL EXAM:    General:     Neurological:    Cardiovascular:    Respiratory:    Gastrointestinal:    Extremities:    Vascular:    Incision Sites:    LABS:                        8.6    4.5   )-----------( 44       ( 04 Jun 2017 05:58 )             27.8       COUMADIN:  Yes/No. REASON: .    PT/INR - ( 04 Jun 2017 05:58 )   PT: 13.9 sec;   INR: 1.25          PTT - ( 04 Jun 2017 05:58 )  PTT:33.3 sec    06-04    139  |  97  |  44<H>  ----------------------------<  81  4.4   |  27  |  7.20<H>    Ca    7.0<L>      04 Jun 2017 05:58    TPro  5.1<L>  /  Alb  2.9<L>  /  TBili  1.8<H>  /  DBili  x   /  AST  186<H>  /  ALT  224<H>  /  AlkPhos  112  06-03          MEDICATIONS  (STANDING):  sodium chloride 0.9% lock flush 3milliLiter(s) IV Push every 8 hours  tamsulosin 0.4milliGRAM(s) Oral at bedtime  mirtazapine 15milliGRAM(s) Oral at bedtime  atorvastatin 40milliGRAM(s) Oral at bedtime  calcium acetate 667milliGRAM(s) Oral three times a day with meals  sevelamer hydrochloride 800milliGRAM(s) Oral daily  pantoprazole    Tablet 40milliGRAM(s) Oral before breakfast  multivitamin 1Tablet(s) Oral daily  predniSONE   Tablet 5milliGRAM(s) Oral daily  midodrine 5milliGRAM(s) Oral every other day    MEDICATIONS  (PRN):        RADIOLOGY & ADDITIONAL TESTS: Patient discussed on morning rounds with Dr. Sanchez      Admission for PVL closure work up    SUBJECTIVE ASSESSMENT:  62y Male with multiple PMH significant for ESRD on HD, HTN, thrombocytopenia, CAD and aortic valvular disease with s/p AVR and CABG (2/2017), diastolic CHF and multiple hospitalizations is transferred from [facility] for care for PVL and its possible intervention.  In the meantime, hematologist is consulted to evaluate his care.  Renal is consulted for HD arrangement and monitor.  Midodrine was initiated and given once, which will be given only prior to HD section.  During this admission, patient is under medical optimization for a PVL closure.  He did ambulate with physical therapies.  Room air Sat at ambulating was 88%; he resumed his saturation immediately at rest (93-95%).      Vital Signs Last 24 Hrs  T(C): 36.4, Max: 36.9 (06-03 @ 17:11)  T(F): 97.5, Max: 98.5 (06-04 @ 09:09)  HR: 80 (64 - 80)  BP: 117/89 (85/50 - 136/63)  BP(mean): 100 (63 - 100)  RR: 20 (16 - 20)  SpO2: 94% (94% - 98%)  I&O's Detail  I & Os for 24h ending 04 Jun 2017 07:00  =============================================  IN:    Oral Fluid: 230 ml    Total IN: 230 ml  ---------------------------------------------  OUT:    Total OUT: 0 ml  ---------------------------------------------  Total NET: 230 ml    I & Os for current day (as of 04 Jun 2017 14:35)  =============================================  IN:    Oral Fluid: 480 ml    Total IN: 480 ml  ---------------------------------------------  OUT:    Total OUT: 0 ml  ---------------------------------------------  Total NET: 480 ml      PHYSICAL EXAM:    General: NAD.    Neurological: grossly intact    Cardiovascular: normal rate with irregular rhythm.      Respiratory: dyspnea and slight rhonchi    Gastrointestinal: BM and BS are intact    Extremities: no edema.  *patient refuse SCDs and TEDs    Vascular: pulses are intact    Incision Sites: examines.      LABS:                        8.6    4.5   )-----------( 44       ( 04 Jun 2017 05:58 )             27.8       PT/INR - ( 04 Jun 2017 05:58 )   PT: 13.9 sec;   INR: 1.25     PTT - ( 04 Jun 2017 05:58 )  PTT:33.3 sec    06-04    139  |  97  |  44<H>  ----------------------------<  81  4.4   |  27  |  7.20<H>    Ca    7.0<L>      04 Jun 2017 05:58    TPro  5.1<L>  /  Alb  2.9<L>  /  TBili  1.8<H>  /  DBili  x   /  AST  186<H>  /  ALT  224<H>  /  AlkPhos  112  06-03    MEDICATIONS  (STANDING):  sodium chloride 0.9% lock flush 3milliLiter(s) IV Push every 8 hours  tamsulosin 0.4milliGRAM(s) Oral at bedtime  mirtazapine 15milliGRAM(s) Oral at bedtime  atorvastatin 40milliGRAM(s) Oral at bedtime  calcium acetate 667milliGRAM(s) Oral three times a day with meals  sevelamer hydrochloride 800milliGRAM(s) Oral daily  pantoprazole    Tablet 40milliGRAM(s) Oral before breakfast  multivitamin 1Tablet(s) Oral daily  predniSONE   Tablet 5milliGRAM(s) Oral daily  midodrine 5milliGRAM(s) Oral every other day    MEDICATIONS  (PRN):        RADIOLOGY & ADDITIONAL TESTS:

## 2017-06-04 NOTE — PHYSICAL THERAPY INITIAL EVALUATION ADULT - GAIT DEVIATIONS NOTED, PT EVAL
decreased lashawn/forward flexed upper thoracic/cervical spine, verbal cues for upright posture/decreased stride length/increased stride width

## 2017-06-04 NOTE — PROGRESS NOTE ADULT - SUBJECTIVE AND OBJECTIVE BOX
62 year old male admitted for aortic insufficiency and mitral regurgitation. Possible cardiac revision. Renal consult requested because patient has ESRD and has been on tiw HD for past 6 years [facility] [place].  lbs ( 67kg). HD MWF.     PAST MEDICAL & SURGICAL HISTORY:  Diastolic CHF  Gout  Aortic valve disease  Atrial fibrillation  ESRD (end stage renal disease) on dialysis  CAD (coronary artery disease)  HTN (hypertension)  H/O partial nephrectomy  AV fistula  S/P colon resection  S/P AVR  S/P CABG (coronary artery bypass graft)  H/O hernia repair    MEDICATIONS  (STANDING):  sodium chloride 0.9% lock flush 3milliLiter(s) IV Push every 8 hours  tamsulosin 0.4milliGRAM(s) Oral at bedtime  mirtazapine 15milliGRAM(s) Oral at bedtime  atorvastatin 40milliGRAM(s) Oral at bedtime  calcium acetate 667milliGRAM(s) Oral three times a day with meals  sevelamer hydrochloride 800milliGRAM(s) Oral daily  pantoprazole    Tablet 40milliGRAM(s) Oral before breakfast  multivitamin 1Tablet(s) Oral daily  predniSONE   Tablet 5milliGRAM(s) Oral daily  midodrine 5milliGRAM(s) Oral daily    MEDICATIONS  (PRN):  Vital Signs Last 24 Hrs  T(C): 36.9, Max: 36.9 (06-03 @ 17:11)  T(F): 98.5, Max: 98.5 (06-04 @ 09:09)  HR: 68 (64 - 74)  BP: 136/63 (85/50 - 136/63)  BP(mean): 90 (63 - 90)  RR: 17 (15 - 17)  SpO2: 95% (95% - 98%)  awake, alert  chest clear  heart S1, S2  abd-benign  ext-LLA AVF                                   8.6    4.5   )-----------( 44       ( 04 Jun 2017 05:58 )             27.8   06-04    139  |  97  |  44<H>  ----------------------------<  81  4.4   |  27  |  7.20<H>    Ca    7.0<L>      04 Jun 2017 05:58    TPro  5.1<L>  /  Alb  2.9<L>  /  TBili  1.8<H>  /  DBili  x   /  AST  186<H>  /  ALT  224<H>  /  AlkPhos  112  06-03

## 2017-06-04 NOTE — PROGRESS NOTE ADULT - SUBJECTIVE AND OBJECTIVE BOX
Dr. Jonny Love  Renal Fellow  Beeper # [phone]        Patient seen and examined at bedside  reports doing well this am  denies nasuea vomitting diarrhea .     sodium chloride 0.9% lock flush 3milliLiter(s) every 8 hours  tamsulosin 0.4milliGRAM(s) at bedtime  mirtazapine 15milliGRAM(s) at bedtime  atorvastatin 40milliGRAM(s) at bedtime  calcium acetate 667milliGRAM(s) three times a day with meals  sevelamer hydrochloride 800milliGRAM(s) daily  pantoprazole    Tablet 40milliGRAM(s) before breakfast  multivitamin 1Tablet(s) daily  predniSONE   Tablet 5milliGRAM(s) daily  midodrine 5milliGRAM(s) every other day      Allergies    No Known Allergies    Intolerances        T(C): , Max: 36.9 (06-03 @ 17:11)  T(F): , Max: 98.5 (06-04 @ 09:09)  HR: 68  BP: 136/63  BP(mean): 90  RR: 17  SpO2: 95%  Wt(kg): --    I & Os for current day (as of 06-04 @ 12:24)  =============================================  IN:    Oral Fluid: 230 ml    Total IN: 230 ml  ---------------------------------------------  OUT:    Total OUT: 0 ml  ---------------------------------------------  Total NET: 230 ml        PHYSICAL EXAM:  Constitutional: Well appearing.  No acute distress  Neck: Supple. No JVD.  Respiratory: Clear to auscultation   Cardiovascular: S1, S2.  Regular rate and rhythm.    Gastrointestinal: soft, non-tender, non-distended, normal bowel sounds  Extremities: Warm.  No lower extremity edema.      ACCESS: avf    LABS:                        8.6    4.5   )-----------( 44       ( 04 Jun 2017 05:58 )             27.8     06-04    139  |  97  |  44<H>  ----------------------------<  81  4.4   |  27  |  7.20<H>    Ca    7.0<L>      04 Jun 2017 05:58    TPro  5.1<L>  /  Alb  2.9<L>  /  TBili  1.8<H>  /  DBili  x   /  AST  186<H>  /  ALT  224<H>  /  AlkPhos  112  06-03      PT/INR - ( 04 Jun 2017 05:58 )   PT: 13.9 sec;   INR: 1.25          PTT - ( 04 Jun 2017 05:58 )  PTT:33.3 sec

## 2017-06-04 NOTE — PHYSICAL THERAPY INITIAL EVALUATION ADULT - IMPAIRMENTS FOUND, PT EVAL
posture/ROM/gait, locomotion, and balance/ventilation and respiration/gas exchange/aerobic capacity/endurance

## 2017-06-04 NOTE — PHYSICAL THERAPY INITIAL EVALUATION ADULT - PLANNED THERAPY INTERVENTIONS, PT EVAL
bed mobility training/stair neg assessment/strengthening/gait training/postural re-education/balance training/transfer training

## 2017-06-05 DIAGNOSIS — N18.9 CHRONIC KIDNEY DISEASE, UNSPECIFIED: ICD-10-CM

## 2017-06-05 DIAGNOSIS — I10 ESSENTIAL (PRIMARY) HYPERTENSION: ICD-10-CM

## 2017-06-05 LAB
ANION GAP SERPL CALC-SCNC: 16 MMOL/L — SIGNIFICANT CHANGE UP (ref 5–17)
APTT BLD: 31.7 SEC — SIGNIFICANT CHANGE UP (ref 27.5–37.4)
BLD GP AB SCN SERPL QL: NEGATIVE — SIGNIFICANT CHANGE UP
BUN SERPL-MCNC: 54 MG/DL — HIGH (ref 7–23)
CALCIUM SERPL-MCNC: 7.3 MG/DL — LOW (ref 8.4–10.5)
CHLORIDE SERPL-SCNC: 100 MMOL/L — SIGNIFICANT CHANGE UP (ref 96–108)
CO2 SERPL-SCNC: 26 MMOL/L — SIGNIFICANT CHANGE UP (ref 22–31)
CREAT SERPL-MCNC: 8.5 MG/DL — HIGH (ref 0.5–1.3)
GLUCOSE SERPL-MCNC: 76 MG/DL — SIGNIFICANT CHANGE UP (ref 70–99)
HBV SURFACE AB SER-ACNC: REACTIVE — SIGNIFICANT CHANGE UP
HBV SURFACE AG SER-ACNC: SIGNIFICANT CHANGE UP
HCT VFR BLD CALC: 28.1 % — LOW (ref 39–50)
HCT VFR BLD CALC: 34.2 % — LOW (ref 39–50)
HCV AB S/CO SERPL IA: 0.1 S/CO — SIGNIFICANT CHANGE UP
HCV AB SERPL-IMP: SIGNIFICANT CHANGE UP
HGB BLD-MCNC: 10.7 G/DL — LOW (ref 13–17)
HGB BLD-MCNC: 8.5 G/DL — LOW (ref 13–17)
INR BLD: 1.22 — HIGH (ref 0.88–1.16)
MCHC RBC-ENTMCNC: 30.2 G/DL — LOW (ref 32–36)
MCHC RBC-ENTMCNC: 30.4 PG — SIGNIFICANT CHANGE UP (ref 27–34)
MCHC RBC-ENTMCNC: 30.7 PG — SIGNIFICANT CHANGE UP (ref 27–34)
MCHC RBC-ENTMCNC: 31.3 G/DL — LOW (ref 32–36)
MCV RBC AUTO: 100.4 FL — HIGH (ref 80–100)
MCV RBC AUTO: 98.3 FL — SIGNIFICANT CHANGE UP (ref 80–100)
PLATELET # BLD AUTO: 52 K/UL — LOW (ref 150–400)
PLATELET # BLD AUTO: 56 K/UL — LOW (ref 150–400)
POTASSIUM SERPL-MCNC: 3.5 MMOL/L — SIGNIFICANT CHANGE UP (ref 3.5–5.3)
POTASSIUM SERPL-SCNC: 3.5 MMOL/L — SIGNIFICANT CHANGE UP (ref 3.5–5.3)
PROTHROM AB SERPL-ACNC: 13.6 SEC — HIGH (ref 9.8–12.7)
RBC # BLD: 2.8 M/UL — LOW (ref 4.2–5.8)
RBC # BLD: 3.48 M/UL — LOW (ref 4.2–5.8)
RBC # FLD: 21.5 % — HIGH (ref 10.3–16.9)
RBC # FLD: 22.1 % — HIGH (ref 10.3–16.9)
RH IG SCN BLD-IMP: POSITIVE — SIGNIFICANT CHANGE UP
SODIUM SERPL-SCNC: 142 MMOL/L — SIGNIFICANT CHANGE UP (ref 135–145)
WBC # BLD: 4.5 K/UL — SIGNIFICANT CHANGE UP (ref 3.8–10.5)
WBC # BLD: 4.8 K/UL — SIGNIFICANT CHANGE UP (ref 3.8–10.5)
WBC # FLD AUTO: 4.5 K/UL — SIGNIFICANT CHANGE UP (ref 3.8–10.5)
WBC # FLD AUTO: 4.8 K/UL — SIGNIFICANT CHANGE UP (ref 3.8–10.5)

## 2017-06-05 PROCEDURE — 71010: CPT | Mod: 26

## 2017-06-05 PROCEDURE — 93010 ELECTROCARDIOGRAM REPORT: CPT

## 2017-06-05 PROCEDURE — 99233 SBSQ HOSP IP/OBS HIGH 50: CPT

## 2017-06-05 RX ORDER — METOPROLOL TARTRATE 50 MG
25 TABLET ORAL DAILY
Qty: 0 | Refills: 0 | Status: DISCONTINUED | OUTPATIENT
Start: 2017-06-05 | End: 2017-06-06

## 2017-06-05 RX ORDER — DIGOXIN 250 MCG
0.12 TABLET ORAL DAILY
Qty: 0 | Refills: 0 | Status: DISCONTINUED | OUTPATIENT
Start: 2017-06-05 | End: 2017-06-06

## 2017-06-05 RX ORDER — CHLORHEXIDINE GLUCONATE 213 G/1000ML
1 SOLUTION TOPICAL ONCE
Qty: 0 | Refills: 0 | Status: COMPLETED | OUTPATIENT
Start: 2017-06-05 | End: 2017-06-06

## 2017-06-05 RX ORDER — METOPROLOL TARTRATE 50 MG
12.5 TABLET ORAL ONCE
Qty: 0 | Refills: 0 | Status: COMPLETED | OUTPATIENT
Start: 2017-06-05 | End: 2017-06-05

## 2017-06-05 RX ORDER — METOPROLOL TARTRATE 50 MG
12.5 TABLET ORAL DAILY
Qty: 0 | Refills: 0 | Status: DISCONTINUED | OUTPATIENT
Start: 2017-06-05 | End: 2017-06-05

## 2017-06-05 RX ORDER — ERYTHROPOIETIN 10000 [IU]/ML
6000 INJECTION, SOLUTION INTRAVENOUS; SUBCUTANEOUS ONCE
Qty: 0 | Refills: 0 | Status: COMPLETED | OUTPATIENT
Start: 2017-06-05 | End: 2017-06-05

## 2017-06-05 RX ORDER — CHLORHEXIDINE GLUCONATE 213 G/1000ML
5 SOLUTION TOPICAL ONCE
Qty: 0 | Refills: 0 | Status: COMPLETED | OUTPATIENT
Start: 2017-06-05 | End: 2017-06-06

## 2017-06-05 RX ADMIN — Medication 12.5 MILLIGRAM(S): at 09:48

## 2017-06-05 RX ADMIN — Medication 1 TABLET(S): at 12:59

## 2017-06-05 RX ADMIN — Medication 667 MILLIGRAM(S): at 12:59

## 2017-06-05 RX ADMIN — Medication 12.5 MILLIGRAM(S): at 23:12

## 2017-06-05 RX ADMIN — SEVELAMER CARBONATE 800 MILLIGRAM(S): 2400 POWDER, FOR SUSPENSION ORAL at 12:59

## 2017-06-05 RX ADMIN — SODIUM CHLORIDE 3 MILLILITER(S): 9 INJECTION INTRAMUSCULAR; INTRAVENOUS; SUBCUTANEOUS at 05:09

## 2017-06-05 RX ADMIN — MIRTAZAPINE 15 MILLIGRAM(S): 45 TABLET, ORALLY DISINTEGRATING ORAL at 23:12

## 2017-06-05 RX ADMIN — Medication 667 MILLIGRAM(S): at 18:32

## 2017-06-05 RX ADMIN — Medication 5 MILLIGRAM(S): at 05:03

## 2017-06-05 RX ADMIN — SODIUM CHLORIDE 3 MILLILITER(S): 9 INJECTION INTRAMUSCULAR; INTRAVENOUS; SUBCUTANEOUS at 14:13

## 2017-06-05 RX ADMIN — SODIUM CHLORIDE 3 MILLILITER(S): 9 INJECTION INTRAMUSCULAR; INTRAVENOUS; SUBCUTANEOUS at 22:30

## 2017-06-05 RX ADMIN — ATORVASTATIN CALCIUM 40 MILLIGRAM(S): 80 TABLET, FILM COATED ORAL at 23:12

## 2017-06-05 RX ADMIN — PANTOPRAZOLE SODIUM 40 MILLIGRAM(S): 20 TABLET, DELAYED RELEASE ORAL at 05:03

## 2017-06-05 RX ADMIN — ERYTHROPOIETIN 6000 UNIT(S): 10000 INJECTION, SOLUTION INTRAVENOUS; SUBCUTANEOUS at 14:08

## 2017-06-05 RX ADMIN — Medication 667 MILLIGRAM(S): at 08:22

## 2017-06-05 RX ADMIN — TAMSULOSIN HYDROCHLORIDE 0.4 MILLIGRAM(S): 0.4 CAPSULE ORAL at 23:12

## 2017-06-05 NOTE — PROGRESS NOTE ADULT - SUBJECTIVE AND OBJECTIVE BOX
Patient discussed on morning rounds with Dr. Lisa       SUBJECTIVE ASSESSMENT:  Patient seen this morning at bedside, doing well and not offering any complaints at this time. Denies any chest pain, shortness of breath or palpitations.     Vital Signs Last 24 Hrs  T(C): 36.5, Max: 36.9 (06-05 @ 09:56)  T(F): 97.7, Max: 98.4 (06-05 @ 09:56)  HR: 112 (70 - 112)  BP: 134/57 (111/53 - 142/78)  BP(mean): 106 (77 - 106)  RR: 17 (17 - 20)  SpO2: 99% (92% - 99%)  I&O's Detail  I & Os for 24h ending 05 Jun 2017 07:00  =============================================  IN:    Oral Fluid: 480 ml    Total IN: 480 ml  ---------------------------------------------  OUT:    Total OUT: 0 ml  ---------------------------------------------  Total NET: 480 ml    I & Os for current day (as of 05 Jun 2017 13:58)  =============================================  IN:    Oral Fluid: 480 ml    Total IN: 480 ml  ---------------------------------------------  OUT:    Total OUT: 0 ml  ---------------------------------------------  Total NET: 480 ml      CHEST TUBE:  No  MARK DRAIN:  No  EPICARDIAL WIRES: No  TIE DOWNS: No  SANCHEZ: No    PHYSICAL EXAM:    General: Patient lying comfortably in bed, no acute distress     Neurological: Alert and oriented. No focal neurological deficits     Cardiovascular: S1S2, Tachycardic and irregular rhythm. No murmurs appreciated on exam     Respiratory: Clear to ausculation bilaterally     Gastrointestinal: Abdomen soft, non tender, non distended. Excess skin noted as well as previous surgical site.     Extremities: Warm and well perfused. No edema or calf tenderness     Vascular: Peripheral pulses 2+ bilaterally     Incision Sites: Previous sternotomy C/D/I, no sternal click     LABS:                        8.5    4.5   )-----------( 52       ( 05 Jun 2017 06:11 )             28.1       COUMADIN:  No    PT/INR - ( 05 Jun 2017 06:11 )   PT: 13.6 sec;   INR: 1.22          PTT - ( 05 Jun 2017 06:11 )  PTT:31.7 sec    06-05    142  |  100  |  54<H>  ----------------------------<  76  3.5   |  26  |  8.50<H>    Ca    7.3<L>      05 Jun 2017 06:11    MEDICATIONS  (STANDING):  sodium chloride 0.9% lock flush 3milliLiter(s) IV Push every 8 hours  tamsulosin 0.4milliGRAM(s) Oral at bedtime  mirtazapine 15milliGRAM(s) Oral at bedtime  atorvastatin 40milliGRAM(s) Oral at bedtime  calcium acetate 667milliGRAM(s) Oral three times a day with meals  sevelamer hydrochloride 800milliGRAM(s) Oral daily  pantoprazole    Tablet 40milliGRAM(s) Oral before breakfast  multivitamin 1Tablet(s) Oral daily  predniSONE   Tablet 5milliGRAM(s) Oral daily  midodrine 5milliGRAM(s) Oral every other day  epoetin thor Injectable 6000Unit(s) IV Push once  metoprolol 12.5milliGRAM(s) Oral daily    MEDICATIONS  (PRN):    RADIOLOGY & ADDITIONAL TESTS:  6/5/17 CXR: pending official read, small left pleural effusion noted.    A/P: 62 year old male PMHx ESRD on HD (MWF) 2/2 IGA nephropathy s/p right nephrectopmy and left partial nephrectomy, diastolic CHF, JADA, gout, HTN, CAD s/p CABG (2/2017), AVR (bioprosthetic), hx of  IVC filter, hx of hernia repair, sigmoid colectomy  Neurovascular: No delirium. Pain well controlled with current regimen.  -PRN's.    Cardiovascular: Hemodynamically stable. HR controlled.  -BP. HR. EKG. TTE. Cardiac Panel. Lipid Panel. BNP.   -ASA. Plavix. BBlocker. Statin.      Respiratory: 02 Sat = 98% on RA.  -If on oxygen wean to RA from for O2 Sat > 93%.  -Encourage C+DB and Use of IS 10x / hr while awake.  -CXR.    GI: Stable.  -NPO after MN.  -PPX.  -PO Diet.    Renal / :  -Monitor renal function.  -Monitor I/O's.    Endocrine:    -A1c.  -TSH.    Hematologic:  -CBC.  -Coagulation Panel.    ID:  -Tempature.  -CBC.  -Observe for SIRS/Sepsis Syndrome.    Prophylaxis:  -DVT prophylaxis with 5000 SubQ Heparin q8h.  -SCD's    Disposition:  -ICU for frequent monitoring.: Patient discussed on morning rounds with Dr. Lisa       SUBJECTIVE ASSESSMENT:  Patient seen this morning at bedside, doing well and not offering any complaints at this time. Denies any chest pain, shortness of breath or palpitations.     Vital Signs Last 24 Hrs  T(C): 36.5, Max: 36.9 (06-05 @ 09:56)  T(F): 97.7, Max: 98.4 (06-05 @ 09:56)  HR: 112 (70 - 112)  BP: 134/57 (111/53 - 142/78)  BP(mean): 106 (77 - 106)  RR: 17 (17 - 20)  SpO2: 99% (92% - 99%)  I&O's Detail  I & Os for 24h ending 05 Jun 2017 07:00  =============================================  IN:    Oral Fluid: 480 ml    Total IN: 480 ml  ---------------------------------------------  OUT:    Total OUT: 0 ml  ---------------------------------------------  Total NET: 480 ml    I & Os for current day (as of 05 Jun 2017 13:58)  =============================================  IN:    Oral Fluid: 480 ml    Total IN: 480 ml  ---------------------------------------------  OUT:    Total OUT: 0 ml  ---------------------------------------------  Total NET: 480 ml      CHEST TUBE:  No  MARK DRAIN:  No  EPICARDIAL WIRES: No  TIE DOWNS: No  SANCHEZ: No    PHYSICAL EXAM:    General: Patient lying comfortably in bed, no acute distress     Neurological: Alert and oriented. No focal neurological deficits     Cardiovascular: S1S2, Tachycardic and irregular rhythm. No murmurs appreciated on exam     Respiratory: Clear to ausculation bilaterally     Gastrointestinal: Abdomen soft, non tender, non distended. Excess skin noted as well as previous surgical site.     Extremities: Warm and well perfused. No edema or calf tenderness     Vascular: Peripheral pulses 2+ bilaterally     Incision Sites: Previous sternotomy C/D/I, no sternal click     LABS:                        8.5    4.5   )-----------( 52       ( 05 Jun 2017 06:11 )             28.1       COUMADIN:  No    PT/INR - ( 05 Jun 2017 06:11 )   PT: 13.6 sec;   INR: 1.22          PTT - ( 05 Jun 2017 06:11 )  PTT:31.7 sec    06-05    142  |  100  |  54<H>  ----------------------------<  76  3.5   |  26  |  8.50<H>    Ca    7.3<L>      05 Jun 2017 06:11    MEDICATIONS  (STANDING):  sodium chloride 0.9% lock flush 3milliLiter(s) IV Push every 8 hours  tamsulosin 0.4milliGRAM(s) Oral at bedtime  mirtazapine 15milliGRAM(s) Oral at bedtime  atorvastatin 40milliGRAM(s) Oral at bedtime  calcium acetate 667milliGRAM(s) Oral three times a day with meals  sevelamer hydrochloride 800milliGRAM(s) Oral daily  pantoprazole    Tablet 40milliGRAM(s) Oral before breakfast  multivitamin 1Tablet(s) Oral daily  predniSONE   Tablet 5milliGRAM(s) Oral daily  midodrine 5milliGRAM(s) Oral every other day  epoetin thor Injectable 6000Unit(s) IV Push once  metoprolol 12.5milliGRAM(s) Oral daily    MEDICATIONS  (PRN):    RADIOLOGY & ADDITIONAL TESTS:  6/5/17 CXR: pending official read, small left pleural effusion noted.    A/P: 62 year old male PMHx ESRD on HD (MWF) 2/2 IGA nephropathy s/p right nephrectopmy and left partial nephrectomy, diastolic CHF, JADA, gout, HTN, CAD s/p CABG (2/2017), AVR (bioprosthetic), hx of  IVC filter, hx of hernia repair, sigmoid colectomy 2/2 diverticulm preformation s/p reversal, Atrial fibrillation (on coumadin) presented to [facility] on 5/28 with chest discomfort and weakness, was admitted to the hospital 1 month prior with similar symptoms c/b PEA cardiac arrest. At [facility] patient found to be hypotensive and transaminitis. TTE showed AI and MR and was medically managed and transferred to [facility] under the care of Dr. Lisa for further evaluation and treatment     Neurovascular: Stable, hx of depression  - continue mirtazapine 15mg daily     Cardiovascular: Hemodynamically stable. HR controlled.  - Severe AI with paravalvular leak and severe MR, patient to go for TTE today   - Preop for paravalvular leak closure this week with Dr. Lisa this week pending preop workup  - L IJ in place from [facility], to come out today   - CAD s/p CABG, continue atorvastatin 40mg qhs. Holding asa due to thrombocytopenia   - Hypotensive over the weekend on midodrine at home, now normotensive. Continue midodrine 5mg if needed with dialysis   - Atrial fibrillation, currently rate controlled. Restart metoprolol 12.5mg daily. Hold amiodarone due to hypotension, consider restarting tomorrow AM per Dr. Lisa     Respiratory: 02 Sat = 99% on RA.  - CXR small left pleural effusion, f/u CXR in AM   - Encourage C+DB and Use of IS 10x / hr while awake.    GI: Stable.  - continue PO diet   - continue protonix 40mg for GI ppx     Renal / : Cr 8.5, ESRD on HD 2/2 IGA nephropathy s/p right nephrectopmy and left partial nephrectomy  - continue tamulosin 0.4mg for BPH   - Nephrology following, HD today. Appreciate recs   - continue EPO with HD   - continue Renagel    - Monitor renal function.  - Monitor I/O's.    Endocrine:  HgbA1c 4.6, TSH 1.78   - No active issues     Hematologic: Thrombocytopenia, platelets 52   - Heme following, appreciate recs. Labs ordered for tomorrow AM.   - Afib on coumadin at home, holding heparin 2/2 thrombocytopenia   - Holding coumadin 2/2 surgical intervention in future   - Anemia H/H 8.5/28.1, received 1u pRBC today with HD. Repeat labs this evening     Rheum: hx of gout   - continue prednisone 1mg daily     Prophylaxis:  -SCD's    Disposition: Pending preop workup

## 2017-06-05 NOTE — PROGRESS NOTE ADULT - ASSESSMENT
62 year old male with CAD, Valvular disease ,s/p CABG/AVR, atrial fibrillation, IgA nephropathy, s/p nephrectomy and partial nephrectomy, ESRD on hemodialysis, hypertension, gout, arthritis, recent cardiac decompensation/arrest requiring pressor support, left pleural effusion, transferred to [facility] from [facility] for further evaluation and management of valvular/cardiac disease, found to have anemia, worsening thrombocytopenia and coagulopathy.  Anemia -multifactorial etiologies - Chronic renal failure, hemolytic anemia destruction from heart valves, possible occult blood loss while anticoagulated, iron deficiency and other nutritional deficiencies,   Thrombocytopenia - destruction of platelets from valvular disease, medications, dialysis, possible immune related cause  Qualitative platelet disorder due to aspirin, renal failure  Coagulopathy - due to warfarin, nutritional deficiency, ? lupus anticoagulant.  Plan: If possible, avoid platelet suppressing medications,  please check iron panel, ferritin, B12, folate levels, erythropoietin level, platelet antibody panel , JASS, lupus anticoagulant, cardiolipin and B2GPI antibody panels, send heparin antibody studies, guaiac stool. Continue Procrit during dialysis. 62 year old male with CAD, Valvular disease ,s/p CABG/AVR, atrial fibrillation, IgA nephropathy, s/p nephrectomy and partial nephrectomy, ESRD on hemodialysis, hypertension, gout, arthritis, recent cardiac decompensation/arrest requiring pressor support, left pleural effusion, transferred to [facility] from [facility] for further evaluation and management of valvular/cardiac disease, found to have anemia, worsening thrombocytopenia and coagulopathy.  Anemia -multifactorial etiologies - Chronic renal failure, hemolytic anemia destruction from heart valves, possible occult blood loss while anticoagulated, iron deficiency and other nutritional deficiencies,   Thrombocytopenia - destruction of platelets from valvular disease, medications, dialysis, possible immune related cause. Slightly better today at 52,000.  Qualitative platelet disorder due to aspirin, renal failure  Coagulopathy - due to warfarin, nutritional deficiency, ? lupus anticoagulant.  Plan: If possible, avoid platelet suppressing medications,  please check iron panel, ferritin, B12, folate levels, erythropoietin level, platelet antibody panel , JASS, lupus anticoagulant, cardiolipin and B2GPI antibody panels, send heparin antibody studies, guaiac stool. Continue Procrit during dialysis.

## 2017-06-05 NOTE — PROGRESS NOTE ADULT - RS GEN PE MLT RESP DETAILS PC
respirations non-labored/breath sounds equal/airway patent/clear to auscultation bilaterally/normal/good air movement

## 2017-06-05 NOTE — PROGRESS NOTE ADULT - SUBJECTIVE AND OBJECTIVE BOX
Patient was seen and evaluated on dialysis.   Patient is tolerating the procedure well.   HR: 112  BP: 142/78  Continue dialysis:   optiflux 180, , , 2K bath   Goal EDW 2 kg over 3 hours.

## 2017-06-05 NOTE — PROGRESS NOTE ADULT - SUBJECTIVE AND OBJECTIVE BOX
Patient seen and examined at bedside. Patient is a 62 year old male with a history of ESRD on HD M/W/F whom was admitted to the hospital for aortic insufficiency and mitral regurgitation. Patient was last dialyzed on Friday 6/2.     sodium chloride 0.9% lock flush 3milliLiter(s) every 8 hours  tamsulosin 0.4milliGRAM(s) at bedtime  mirtazapine 15milliGRAM(s) at bedtime  atorvastatin 40milliGRAM(s) at bedtime  calcium acetate 667milliGRAM(s) three times a day with meals  sevelamer hydrochloride 800milliGRAM(s) daily  pantoprazole    Tablet 40milliGRAM(s) before breakfast  multivitamin 1Tablet(s) daily  predniSONE   Tablet 5milliGRAM(s) daily  midodrine 5milliGRAM(s) every other day  epoetin thor Injectable 6000Unit(s) once  metoprolol 12.5milliGRAM(s) daily    Allergies    No Known Allergies    Intolerances    T(C): , Max: 36.9 (06-05 @ 09:56)  T(F): , Max: 98.4 (06-05 @ 09:56)  HR: 112  BP: 142/78  BP(mean): 105  RR: 19  SpO2: 99%    I & Os for 24h ending 06-05 @ 07:00  =============================================  IN:    Oral Fluid: 480 ml    Total IN: 480 ml  ---------------------------------------------  OUT:    Total OUT: 0 ml  ---------------------------------------------  Total NET: 480 ml    I & Os for current day (as of 06-05 @ 11:42)  =============================================  IN:    Oral Fluid: 240 ml    Total IN: 240 ml  ---------------------------------------------  OUT:    Total OUT: 0 ml  ---------------------------------------------  Total NET: 240 ml    LABS:                        8.5    4.5   )-----------( 52       ( 05 Jun 2017 06:11 )             28.1     06-05    142  |  100  |  54<H>  ----------------------------<  76  3.5   |  26  |  8.50<H>    Ca    7.3<L>      05 Jun 2017 06:11    PT/INR - ( 05 Jun 2017 06:11 )   PT: 13.6 sec;   INR: 1.22       PTT - ( 05 Jun 2017 06:11 )  PTT:31.7 sec

## 2017-06-05 NOTE — PROGRESS NOTE ADULT - SUBJECTIVE AND OBJECTIVE BOX
Planned Date of Surgery: 6/6/17                                                                                                                  Surgeon: Dr. Lisa     Procedure: paravalvular leak closure     HPI:  61 y/o male with hx of ESRD on HD ( M/W/F) via L AV fistula secondary to IGA nephropathy s/p right nephrectomy and left partial nephrectomy, diastolic CHF, JADA, gout, HTN, CAD s/p CABG/AVR (2/2017), hx IVC filter, hx hernia repair, sigmoid colectomy secondary to diverticulum and perforation s/p reversal, Afib (on coumadin) presented to [facility] on 5/28 with chest discomfort, palpitations, weakness and fatigue of 1 day duration along with depressed mood and anxiety. He was recently admitted with similar complaint and was complicated by PEA cardiac arrest. On admission to [facility] he was found to be in SB (50's) with a BP of 74/39. He was admitted to the CCU for treatment. He required pressor support, was found to have transaminitis, a WC of 14, and L pleural effusion. A abdominal US was performed showing patent hepatic portal veins. He had a thoracentesis after FFP ( for INR 3.5) draining 1L of transudative fluid (culture negative). Echo was done showing an EF of 50%, 2+ aortic regurgitation, 3+ MR, dilated RV with decreased systolic function, high PA systolic pressures and 1-2+ TR. Patient was medically managed and was weaned off pressor support at [facility]. He is now transferred to [facility] for further evaluation and possible structural heart intervention. He currently denies any CP, palpitations, SOB, cough, N/V, F/C, dizziness, lightheadedness.    Of note patient also had a negative doppler study for r/o DVT, has chronic thrombocytopenia on documentation. He was recently admitted to [facility] for similar symptoms and was discharged 2 days prior to readmission.     PAST MEDICAL & SURGICAL HISTORY:  Diastolic CHF  Gout  Aortic valve disease  Atrial fibrillation  ESRD (end stage renal disease) on dialysis  CAD (coronary artery disease)  HTN (hypertension)  H/O partial nephrectomy  AV fistula  S/P colon resection  S/P AVR  S/P CABG (coronary artery bypass graft)  H/O hernia repair      No Known Allergies      MEDICATIONS  (STANDING):  sodium chloride 0.9% lock flush 3milliLiter(s) IV Push every 8 hours  tamsulosin 0.4milliGRAM(s) Oral at bedtime  mirtazapine 15milliGRAM(s) Oral at bedtime  atorvastatin 40milliGRAM(s) Oral at bedtime  calcium acetate 667milliGRAM(s) Oral three times a day with meals  sevelamer hydrochloride 800milliGRAM(s) Oral daily  pantoprazole    Tablet 40milliGRAM(s) Oral before breakfast  multivitamin 1Tablet(s) Oral daily  predniSONE   Tablet 5milliGRAM(s) Oral daily  midodrine 5milliGRAM(s) Oral every other day  metoprolol 12.5milliGRAM(s) Oral daily    MEDICATIONS  (PRN):      On Beta Blocker? YES    Labs:                        8.5    4.5   )-----------( 52       ( 05 Jun 2017 06:11 )             28.1     06-05    142  |  100  |  54<H>  ----------------------------<  76  3.5   |  26  |  8.50<H>    Ca    7.3<L>      05 Jun 2017 06:11      PT/INR - ( 05 Jun 2017 06:11 )   PT: 13.6 sec;   INR: 1.22          PTT - ( 05 Jun 2017 06:11 )  PTT:31.7 sec    ABO Interpretation: A (06-05 @ 12:34)    Hgb A1C: 4.6     EKG: Pending     CXR: Mild improvement in the pulmonary venous congestion and left pleural   effusion. Unchanged retrocardiac consolidation.    Calcifications in the right and left neck which may represent calcified   lymph nodes or calcification of the carotid arteries. Consider carotid   artery Doppler clinicalindication indicated.    Cath Report: Non obstructive CAD at outside hospital     Echo: Pending tomorrow AM 8AM     PFT's: Pending     Carotid Duplex: Pending     Consult in Chart?  YES   Consent in Chart? YES  Pre-op Orders Placed? YES   Blood Products Ordered? YES   NPO ordered? YES

## 2017-06-05 NOTE — PROGRESS NOTE ADULT - SUBJECTIVE AND OBJECTIVE BOX
62 year old male admitted for aortic insufficiency and mitral regurgitation. Possible cardiac revision. Renal consult requested because patient has ESRD and has been on tiw HD for past 6 years [facility] in [place].  lbs ( 67kg). HD MWF.     PAST MEDICAL & SURGICAL HISTORY:  Diastolic CHF  Gout  Aortic valve disease  Atrial fibrillation  ESRD (end stage renal disease) on dialysis  CAD (coronary artery disease)  HTN (hypertension)  H/O partial nephrectomy  AV fistula  S/P colon resection  S/P AVR  S/P CABG (coronary artery bypass graft)  H/O hernia repair    MEDICATIONS  (STANDING):  sodium chloride 0.9% lock flush 3milliLiter(s) IV Push every 8 hours  tamsulosin 0.4milliGRAM(s) Oral at bedtime  mirtazapine 15milliGRAM(s) Oral at bedtime  atorvastatin 40milliGRAM(s) Oral at bedtime  calcium acetate 667milliGRAM(s) Oral three times a day with meals  sevelamer hydrochloride 800milliGRAM(s) Oral daily  pantoprazole    Tablet 40milliGRAM(s) Oral before breakfast  multivitamin 1Tablet(s) Oral daily  predniSONE   Tablet 5milliGRAM(s) Oral daily  midodrine 5milliGRAM(s) Oral daily    MEDICATIONS  (PRN):      Vital Signs Last 24 Hrs  T(C): 36.9, Max: 36.9 (06-05 @ 09:56)  T(F): 98.4, Max: 98.4 (06-05 @ 09:56)  HR: 112 (70 - 112)  BP: 142/78 (111/53 - 142/78)  BP(mean): 105 (77 - 105)  RR: 19 (19 - 20)  SpO2: 99% (92% - 99%)  seen during dialysis in room  awake, alert  chest clear  heart S1, S2  abd-benign  ext-LLA AVF                                       8.5    4.5   )-----------( 52       ( 05 Jun 2017 06:11 )             28.1   06-05    142  |  100  |  54<H>  ----------------------------<  76  3.5   |  26  |  8.50<H>    Ca    7.3<L>      05 Jun 2017 06:11

## 2017-06-05 NOTE — PROGRESS NOTE ADULT - ASSESSMENT
ESRD  ASHD  MR/AI    Plan:    HD MWF  cardiac evaluation  valve repair by intravascular approach  supportive care    ERENDIRA Roche MD

## 2017-06-05 NOTE — PROGRESS NOTE ADULT - SUBJECTIVE AND OBJECTIVE BOX
The patient was seen and examined.      The patient is a 62y Male  with history of ESRD on hemodialysis, CAD, s/p CABG and AVR, HTN, gout, anemia, thrombocytopenia admitted with CAD and valvular disease.  .         Allergies    No Known Allergies    Intolerances        Medications:  MEDICATIONS  (STANDING):  sodium chloride 0.9% lock flush 3milliLiter(s) IV Push every 8 hours  tamsulosin 0.4milliGRAM(s) Oral at bedtime  mirtazapine 15milliGRAM(s) Oral at bedtime  atorvastatin 40milliGRAM(s) Oral at bedtime  calcium acetate 667milliGRAM(s) Oral three times a day with meals  sevelamer hydrochloride 800milliGRAM(s) Oral daily  pantoprazole    Tablet 40milliGRAM(s) Oral before breakfast  multivitamin 1Tablet(s) Oral daily  predniSONE   Tablet 5milliGRAM(s) Oral daily  midodrine 5milliGRAM(s) Oral every other day  epoetin thor Injectable 6000Unit(s) IV Push once  metoprolol 12.5milliGRAM(s) Oral daily    MEDICATIONS  (PRN):          Interval History: The patient is comfortable sitting in chair. Denies SOB, chest pain. No bleeding from nose, mouth or in urine or stool.  The patient denies chest pain or SOB.  No nausea/vomiting/fevers/chills/night sweats.  Has fatigue, no headaches/dizziness.  Appetite is only fair and he has had weight loss.  No abdominal pain/diarrhea/constipation.  No melena or hematochezia.    No dysuria/hematuria.  Has  history of easy bruising/no bleeding.  No gingival bleeding or epistaxis.  No leg pain or leg swelling.    ROS is otherwise negative.    PHYSICAL EXAM:    T(F): 98.4, Max: 98.4 (-05 @ 09:56)  HR: 112 (70 - 112)  BP: 142/78 (111/53 - 142/78)  RR: 19 (19 - 20)  SpO2: 99% (92% - 99%)  Wt(kg): --    Daily     Daily Weight in k.7 (2017 06:23)    Gen: well developed, thin, comfortable  HEENT: normocephalic/atraumatic, has conjunctival pallor, no scleral icterus, no oral thrush/mucosal bleeding/mucositis  Neck: supple, no masses, no JVD, no bleeding from triple lumen catheter  Cardiovascular: RR, nl S1S2, has murmurs  Respiratory: basilar rales, L more than right  Gastrointestinal: BS+, soft, NT/ND, no masses, no splenomegaly, no hepatomegaly, no evidence for ascites  Extremities: no clubbing/cyanosis, no edema, no calf tenderness, LUE fistula for dialysis  Neurological: no focal deficits  Skin: no rash on visible skin, has ecchymoses or petechiae  Lymph Nodes:  no significant peripheral adenopathy   Musculoskeletal:  full ROM  Psychiatric:  mood stable        Labs:                          8.5    4.5   )-----------( 52       ( 2017 06:11 )             28.1     CBC Full  -  ( 2017 06:11 )  WBC Count : 4.5 K/uL  Hemoglobin : 8.5 g/dL  Hematocrit : 28.1 %  Platelet Count - Automated : 52 K/uL  Mean Cell Volume : 100.4 fL  Mean Cell Hemoglobin : 30.4 pg  Mean Cell Hemoglobin Concentration : 30.2 g/dL  Auto Neutrophil # : x  Auto Lymphocyte # : x  Auto Monocyte # : x  Auto Eosinophil # : x  Auto Basophil # : x  Auto Neutrophil % : x  Auto Lymphocyte % : x  Auto Monocyte % : x  Auto Eosinophil % : x  Auto Basophil % : x    PT/INR - ( 2017 06:11 )   PT: 13.6 sec;   INR: 1.22          PTT - ( 2017 06:11 )  PTT:31.7 sec        142  |  100  |  54<H>  ----------------------------<  76  3.5   |  26  |  8.50<H>    Ca    7.3<L>      2017 06:11            Other Labs:    Cultures:    Pathology:    Imaging Studies:

## 2017-06-05 NOTE — PROGRESS NOTE ADULT - ASSESSMENT
Patient seen and examined at bedside. Patient is a 62 year old male with a history of ESRD on HD M/W/F whom was admitted to the hospital for aortic insufficiency and mitral regurgitation.

## 2017-06-06 ENCOUNTER — APPOINTMENT (OUTPATIENT)
Dept: CARDIOTHORACIC SURGERY | Facility: HOSPITAL | Age: 62
End: 2017-06-06

## 2017-06-06 LAB
ALBUMIN SERPL ELPH-MCNC: 2.4 G/DL — LOW (ref 3.3–5)
ALP SERPL-CCNC: 87 U/L — SIGNIFICANT CHANGE UP (ref 40–120)
ALT FLD-CCNC: 40 U/L — SIGNIFICANT CHANGE UP (ref 10–45)
ANION GAP SERPL CALC-SCNC: 11 MMOL/L — SIGNIFICANT CHANGE UP (ref 5–17)
ANION GAP SERPL CALC-SCNC: 15 MMOL/L — SIGNIFICANT CHANGE UP (ref 5–17)
APTT BLD: 35 SEC — SIGNIFICANT CHANGE UP (ref 27.5–37.4)
AST SERPL-CCNC: 30 U/L — SIGNIFICANT CHANGE UP (ref 10–40)
BILIRUB SERPL-MCNC: 1.1 MG/DL — SIGNIFICANT CHANGE UP (ref 0.2–1.2)
BUN SERPL-MCNC: 40 MG/DL — HIGH (ref 7–23)
BUN SERPL-MCNC: 42 MG/DL — HIGH (ref 7–23)
CALCIUM SERPL-MCNC: 8.2 MG/DL — LOW (ref 8.4–10.5)
CALCIUM SERPL-MCNC: 8.8 MG/DL — SIGNIFICANT CHANGE UP (ref 8.4–10.5)
CHLORIDE SERPL-SCNC: 104 MMOL/L — SIGNIFICANT CHANGE UP (ref 96–108)
CHLORIDE SERPL-SCNC: 98 MMOL/L — SIGNIFICANT CHANGE UP (ref 96–108)
CO2 SERPL-SCNC: 24 MMOL/L — SIGNIFICANT CHANGE UP (ref 22–31)
CO2 SERPL-SCNC: 29 MMOL/L — SIGNIFICANT CHANGE UP (ref 22–31)
CREAT SERPL-MCNC: 6.4 MG/DL — HIGH (ref 0.5–1.3)
CREAT SERPL-MCNC: 6.5 MG/DL — HIGH (ref 0.5–1.3)
FERRITIN SERPL-MCNC: 1849 NG/ML — HIGH (ref 30–400)
FOLATE SERPL-MCNC: 6.5 NG/ML — SIGNIFICANT CHANGE UP (ref 4.8–24.2)
GAS PNL BLDA: SIGNIFICANT CHANGE UP
GLUCOSE SERPL-MCNC: 231 MG/DL — HIGH (ref 70–99)
GLUCOSE SERPL-MCNC: 71 MG/DL — SIGNIFICANT CHANGE UP (ref 70–99)
HCT VFR BLD CALC: 29.3 % — LOW (ref 39–50)
HCT VFR BLD CALC: 35.8 % — LOW (ref 39–50)
HGB BLD-MCNC: 11.2 G/DL — LOW (ref 13–17)
HGB BLD-MCNC: 9 G/DL — LOW (ref 13–17)
INR BLD: 1.45 — HIGH (ref 0.88–1.16)
IRON SATN MFR SERPL: 28 % — SIGNIFICANT CHANGE UP (ref 16–55)
IRON SATN MFR SERPL: 44 UG/DL — LOW (ref 45–165)
MAGNESIUM SERPL-MCNC: 1.8 MG/DL — SIGNIFICANT CHANGE UP (ref 1.6–2.6)
MAGNESIUM SERPL-MCNC: 2 MG/DL — SIGNIFICANT CHANGE UP (ref 1.6–2.6)
MCHC RBC-ENTMCNC: 30.6 PG — SIGNIFICANT CHANGE UP (ref 27–34)
MCHC RBC-ENTMCNC: 30.7 G/DL — LOW (ref 32–36)
MCHC RBC-ENTMCNC: 30.9 PG — SIGNIFICANT CHANGE UP (ref 27–34)
MCHC RBC-ENTMCNC: 31.3 G/DL — LOW (ref 32–36)
MCV RBC AUTO: 98.9 FL — SIGNIFICANT CHANGE UP (ref 80–100)
MCV RBC AUTO: 99.7 FL — SIGNIFICANT CHANGE UP (ref 80–100)
PHOSPHATE SERPL-MCNC: 2.4 MG/DL — LOW (ref 2.5–4.5)
PHOSPHATE SERPL-MCNC: 5.2 MG/DL — HIGH (ref 2.5–4.5)
PLATELET # BLD AUTO: 40 K/UL — LOW (ref 150–400)
PLATELET # BLD AUTO: 43 K/UL — LOW (ref 150–400)
POTASSIUM SERPL-MCNC: 3.9 MMOL/L — SIGNIFICANT CHANGE UP (ref 3.5–5.3)
POTASSIUM SERPL-MCNC: 5.1 MMOL/L — SIGNIFICANT CHANGE UP (ref 3.5–5.3)
POTASSIUM SERPL-SCNC: 3.9 MMOL/L — SIGNIFICANT CHANGE UP (ref 3.5–5.3)
POTASSIUM SERPL-SCNC: 5.1 MMOL/L — SIGNIFICANT CHANGE UP (ref 3.5–5.3)
PROT SERPL-MCNC: 4.3 G/DL — LOW (ref 6–8.3)
PROTHROM AB SERPL-ACNC: 16.2 SEC — HIGH (ref 9.8–12.7)
RBC # BLD: 2.94 M/UL — LOW (ref 4.2–5.8)
RBC # BLD: 3.62 M/UL — LOW (ref 4.2–5.8)
RBC # FLD: 21.3 % — HIGH (ref 10.3–16.9)
RBC # FLD: 22.3 % — HIGH (ref 10.3–16.9)
SODIUM SERPL-SCNC: 139 MMOL/L — SIGNIFICANT CHANGE UP (ref 135–145)
SODIUM SERPL-SCNC: 142 MMOL/L — SIGNIFICANT CHANGE UP (ref 135–145)
TIBC SERPL-MCNC: 156 UG/DL — LOW (ref 220–430)
TRANSFERRIN SERPL-MCNC: 127 MG/DL — LOW (ref 200–360)
UIBC SERPL-MCNC: 112 UG/DL — SIGNIFICANT CHANGE UP (ref 110–370)
VIT B12 SERPL-MCNC: 803 PG/ML — SIGNIFICANT CHANGE UP (ref 243–894)
WBC # BLD: 5 K/UL — SIGNIFICANT CHANGE UP (ref 3.8–10.5)
WBC # BLD: 5.9 K/UL — SIGNIFICANT CHANGE UP (ref 3.8–10.5)
WBC # FLD AUTO: 5 K/UL — SIGNIFICANT CHANGE UP (ref 3.8–10.5)
WBC # FLD AUTO: 5.9 K/UL — SIGNIFICANT CHANGE UP (ref 3.8–10.5)

## 2017-06-06 PROCEDURE — 93321 DOPPLER ECHO F-UP/LMTD STD: CPT | Mod: 26,59

## 2017-06-06 PROCEDURE — 93325 DOPPLER ECHO COLOR FLOW MAPG: CPT | Mod: 26,59

## 2017-06-06 PROCEDURE — 94010 BREATHING CAPACITY TEST: CPT | Mod: 26

## 2017-06-06 PROCEDURE — 71010: CPT | Mod: 26

## 2017-06-06 PROCEDURE — 93312 ECHO TRANSESOPHAGEAL: CPT | Mod: 26

## 2017-06-06 PROCEDURE — 93306 TTE W/DOPPLER COMPLETE: CPT | Mod: 26

## 2017-06-06 PROCEDURE — 71010: CPT | Mod: 26,77

## 2017-06-06 RX ORDER — INSULIN HUMAN 100 [IU]/ML
2 INJECTION, SOLUTION SUBCUTANEOUS
Qty: 250 | Refills: 0 | Status: DISCONTINUED | OUTPATIENT
Start: 2017-06-06 | End: 2017-06-07

## 2017-06-06 RX ORDER — PANTOPRAZOLE SODIUM 20 MG/1
40 TABLET, DELAYED RELEASE ORAL DAILY
Qty: 0 | Refills: 0 | Status: DISCONTINUED | OUTPATIENT
Start: 2017-06-06 | End: 2017-06-07

## 2017-06-06 RX ORDER — SODIUM CHLORIDE 9 MG/ML
1000 INJECTION, SOLUTION INTRAVENOUS
Qty: 0 | Refills: 0 | Status: DISCONTINUED | OUTPATIENT
Start: 2017-06-06 | End: 2017-06-08

## 2017-06-06 RX ORDER — HEPARIN SODIUM 5000 [USP'U]/ML
5000 INJECTION INTRAVENOUS; SUBCUTANEOUS EVERY 8 HOURS
Qty: 0 | Refills: 0 | Status: DISCONTINUED | OUTPATIENT
Start: 2017-06-06 | End: 2017-06-08

## 2017-06-06 RX ORDER — CEFAZOLIN SODIUM 1 G
2000 VIAL (EA) INJECTION EVERY 8 HOURS
Qty: 0 | Refills: 0 | Status: DISCONTINUED | OUTPATIENT
Start: 2017-06-06 | End: 2017-06-06

## 2017-06-06 RX ORDER — CEFAZOLIN SODIUM 1 G
2000 VIAL (EA) INJECTION ONCE
Qty: 0 | Refills: 0 | Status: COMPLETED | OUTPATIENT
Start: 2017-06-07 | End: 2017-06-07

## 2017-06-06 RX ORDER — POTASSIUM CHLORIDE 20 MEQ
20 PACKET (EA) ORAL ONCE
Qty: 0 | Refills: 0 | Status: COMPLETED | OUTPATIENT
Start: 2017-06-06 | End: 2017-06-06

## 2017-06-06 RX ADMIN — SODIUM CHLORIDE 3 MILLILITER(S): 9 INJECTION INTRAMUSCULAR; INTRAVENOUS; SUBCUTANEOUS at 13:02

## 2017-06-06 RX ADMIN — Medication 1 TABLET(S): at 13:00

## 2017-06-06 RX ADMIN — Medication 0.12 MILLIGRAM(S): at 06:58

## 2017-06-06 RX ADMIN — SODIUM CHLORIDE 3 MILLILITER(S): 9 INJECTION INTRAMUSCULAR; INTRAVENOUS; SUBCUTANEOUS at 06:49

## 2017-06-06 RX ADMIN — Medication 50 MILLILITER(S): at 21:33

## 2017-06-06 RX ADMIN — CHLORHEXIDINE GLUCONATE 5 MILLILITER(S): 213 SOLUTION TOPICAL at 06:59

## 2017-06-06 RX ADMIN — MIDODRINE HYDROCHLORIDE 5 MILLIGRAM(S): 2.5 TABLET ORAL at 13:00

## 2017-06-06 RX ADMIN — Medication 25 MILLIGRAM(S): at 06:59

## 2017-06-06 RX ADMIN — Medication 50 MILLILITER(S): at 22:45

## 2017-06-06 RX ADMIN — Medication 20 MILLIEQUIVALENT(S): at 13:00

## 2017-06-06 RX ADMIN — Medication 667 MILLIGRAM(S): at 13:00

## 2017-06-06 RX ADMIN — CHLORHEXIDINE GLUCONATE 1 APPLICATION(S): 213 SOLUTION TOPICAL at 06:48

## 2017-06-06 NOTE — DIETITIAN INITIAL EVALUATION ADULT. - OTHER INFO
61y/o M for paravalvular leak closure today. NPO ordered. Pt seen resting in bed. He reports a fair appetite/intake prior to NPO: ~50-75% of meals and reports liking the food. Denies N/V/D/C, pain, and mechanical issues with po intake. Pt with h/o ESRD on HD (M/W/F). Last HD (6/5)--> dry wt 54.6. PTA pt reports following a renal diet, but states MD wants him to eat high fat items to help with wt gain so has not been following CHF diet. #, which indicates ~53# wt loss within ~4months. Wt discrepancies also noted likely due to fluid shifts. Suspect severe PCM 2/2 decreased intake, wt loss, and muscle wasting; see malnutrition chart note in EMR. Reinforced CHF, renal diet education and discussed strategies for wt gain while still being compliant with diet restrictions. Pt refusing all supplementation stating he does not like them. Advance diet once medically feasible and monitor meal intake/tolerance. Will follow.

## 2017-06-06 NOTE — DIETITIAN INITIAL EVALUATION ADULT. - ENERGY NEEDS
IBW 80.9Kg  %IBW 77%  BMI 18.5    Utilized IBW to calculate needs, <80% of IBW. Needs adjusted for repletion and HD. Fluids per MD 2/2 CHF and HD.

## 2017-06-06 NOTE — PROGRESS NOTE ADULT - SUBJECTIVE AND OBJECTIVE BOX
Patient seen and examined at bedside. Patient appears to be comfortable and in no acute distress. Patient was last dialyzed 6/5. Patient is planned for paravalvular leak repair.     sodium chloride 0.9% lock flush 3milliLiter(s) every 8 hours  tamsulosin 0.4milliGRAM(s) at bedtime  mirtazapine 15milliGRAM(s) at bedtime  atorvastatin 40milliGRAM(s) at bedtime  calcium acetate 667milliGRAM(s) three times a day with meals  sevelamer hydrochloride 800milliGRAM(s) daily  pantoprazole    Tablet 40milliGRAM(s) before breakfast  multivitamin 1Tablet(s) daily  predniSONE   Tablet 5milliGRAM(s) daily  midodrine 5milliGRAM(s) every other day  metoprolol 25milliGRAM(s) daily  digoxin     Tablet 0.125milliGRAM(s) daily  potassium chloride    Tablet ER 20milliEquivalent(s) once    Allergies    No Known Allergies    Intolerances    T(C): , Max: 37.2 (06-05 @ 22:41)  T(F): , Max: 99 (06-05 @ 22:41)  HR: 90  BP: 106/57  BP(mean): 93  RR: 16  SpO2: 99%    I & Os for current day (as of 06-06 @ 11:38)  =============================================  IN:    Oral Fluid: 680 ml    Total IN: 680 ml  ---------------------------------------------  OUT:    Other: 1800 ml    Total OUT: 1800 ml  ---------------------------------------------  Total NET: -1120 ml    Height (cm): 182.9 (06-06 @ 04:44)  Weight (kg): 62 (06-06 @ 05:08)  BMI (kg/m2): 18.5 (06-06 @ 05:08)  BSA (m2): 1.81 (06-06 @ 05:08)      LABS:                        11.2   5.0   )-----------( 43       ( 06 Jun 2017 07:35 )             35.8     06-06    142  |  98  |  40<H>  ----------------------------<  71  3.9   |  29  |  6.40<H>    Ca    8.2<L>      06 Jun 2017 07:35  Phos  2.4     06-06  Mg     2.0     06-06    PT/INR - ( 05 Jun 2017 06:11 )   PT: 13.6 sec;   INR: 1.22       PTT - ( 05 Jun 2017 06:11 )  PTT:31.7 sec

## 2017-06-06 NOTE — PROGRESS NOTE ADULT - ASSESSMENT
ESRD  ASHD  MR/AI    Plan:    HD MWF  cardiac followup  valve repair today by intravascular approach  supportive care    ERENDIRA Roche MD

## 2017-06-06 NOTE — CHART NOTE - NSCHARTNOTEFT_GEN_A_CORE
Upon Nutritional Assessment by the Registered Dietitian your patient was determined to meet criteria / has evidence of the following diagnosis/diagnoses:          [ ]  Mild Protein Calorie Malnutrition        [ ]  Moderate Protein Calorie Malnutrition        [X ] Severe Protein Calorie Malnutrition        [ ] Unspecified Protein Calorie Malnutrition        [X ] Underweight / BMI <19        [ ] Morbid Obesity / BMI > 40      Findings as based on:  •  Comprehensive nutrition assessment and consultation  •  Calorie counts (nutrient intake analysis)  •  Food acceptance and intake status from observations by staff  •  Follow up  •  Patient education  •  Intervention secondary to interdisciplinary rounds  •   concerns      Treatment:    The following diet has been recommended:      PROVIDER Section:     By signing this assessment you are acknowledging and agree with the diagnosis/diagnoses assigned by the Registered Dietitian    Comments:

## 2017-06-06 NOTE — BRIEF OPERATIVE NOTE - OPERATION/FINDINGS
6F rt CFA and 6F lt CFA sheaths  5F MPA catheter used to crossed PVL in NCC, 5F 110cm Shuttle sheath advanced across PVL into LV  8mm  II device positioned/deployed with trivial PVL present through device  5F AL1 catheter was placed into the LCC/RCC, crossed PVL at the junction of the sinuses  5F 110cm Shuttle sheath was advanced across PVL into LV  Multiple attempts were made to position  II 10mm, 12mm, 8mm/8mm without significant change in PVL  Finally 10mm/8mm devices were positioned/deployed with significant reduction in PVL - moderate (reduced from severe) through this PVL  No further intervention performed  hemostasis: rt CFA/lt CFA proglide x1    imp: severe aortic PVL s/p successful placement of an 8mm  II (NCC), 10mm/8mm  II (LCC/RCC), significant reduction in PVR; acute diastolic CHF; severe MR; CAD s/p CABG  plan:  -routine post-catheterization care  -hold asa/anticoagulation given thrombocytopenia  -hemodialysis this pm  -consider lt thoracentesis; wean to extubation  -TTE tomorrow  -consider for mitral valve intervention

## 2017-06-06 NOTE — PROGRESS NOTE ADULT - ASSESSMENT
63yo with multiple medical problems including ESRD on HD due to IgA nephropathy, nephrectomy, CAD s/p CABG/AVR in Feb 2017, afib on anticoagulationDVT s/p IVC filter admitted with chest discomfort shortness of breath.  Patient diagnosed with valvular heart disease, paravalvular leak of Aortic valve and severe MR.      s/p PVL closure uncomplicated  During the case transient LBBB  chronic Afib   ESRD K 6 during case    Plan   -- currently undergoing HD  -- continue supportive care and plan to transfuse 1 unit of blood  -- vent weaning  -- plan to drain Left sided effusion  -- periop care -- GI proph, antibiotics.      Critical care time spent 50 min

## 2017-06-06 NOTE — PROGRESS NOTE ADULT - SUBJECTIVE AND OBJECTIVE BOX
62 year old male admitted for aortic insufficiency and mitral regurgitation. Possible cardiac revision. Renal consult requested because patient has ESRD and has been on tiw HD for past 6 years [facility] [place].  lbs ( 67kg). HD MWF.     PAST MEDICAL & SURGICAL HISTORY:  Diastolic CHF  Gout  Aortic valve disease  Atrial fibrillation  ESRD (end stage renal disease) on dialysis  CAD (coronary artery disease)  HTN (hypertension)  H/O partial nephrectomy  AV fistula  S/P colon resection  S/P AVR  S/P CABG (coronary artery bypass graft)  H/O hernia repair    MEDICATIONS  (STANDING):  sodium chloride 0.9% lock flush 3milliLiter(s) IV Push every 8 hours  tamsulosin 0.4milliGRAM(s) Oral at bedtime  mirtazapine 15milliGRAM(s) Oral at bedtime  atorvastatin 40milliGRAM(s) Oral at bedtime  calcium acetate 667milliGRAM(s) Oral three times a day with meals  sevelamer hydrochloride 800milliGRAM(s) Oral daily  pantoprazole    Tablet 40milliGRAM(s) Oral before breakfast  multivitamin 1Tablet(s) Oral daily  predniSONE   Tablet 5milliGRAM(s) Oral daily  midodrine 5milliGRAM(s) Oral daily    MEDICATIONS  (PRN):      Vital Signs Last 24 Hrs  T(C): 36.3, Max: 37.2 (06-05 @ 22:41)  T(F): 97.4, Max: 99 (06-05 @ 22:41)  HR: 90 (90 - 118)  BP: 106/57 (101/57 - 134/57)  BP(mean): 93 (74 - 106)  RR: 16 (16 - 18)  SpO2: 99% (95% - 99%)  seen in room  awake, alert  chest clear  heart S1, S2  abd-benign  ext-LLA AVF                                        11.2   5.0   )-----------( 43       ( 06 Jun 2017 07:35 )             35.8     06-06    142  |  98  |  40<H>  ----------------------------<  71  3.9   |  29  |  6.40<H>    Ca    8.2<L>      06 Jun 2017 07:35  Phos  2.4     06-06  Mg     2.0     06-06

## 2017-06-06 NOTE — BRIEF OPERATIVE NOTE - PRE-OP DX
Acute diastolic congestive heart failure, NYHA class 4  06/06/2017    Active  Kelvin Lisa  Paravalvular leak of prosthetic heart valve, initial encounter  06/06/2017    Active  Kelvin Lisa

## 2017-06-06 NOTE — PROGRESS NOTE ADULT - SUBJECTIVE AND OBJECTIVE BOX
pt seen s/p surgery. pt to get hd 3hrs 2k 2l. pt seen s/p surgery.  1.5 liter given during procedure     HR: 88 BP: 109/53  pt to get hd this evening  3hrs 2k 2l.   d/w surgical team.

## 2017-06-06 NOTE — DIETITIAN INITIAL EVALUATION ADULT. - PHYSICAL APPEARANCE
noticeable loss of lean body mass with muscle wasting to clavicular, shoulder, and tricep region with squaring of the deltoids/underweight

## 2017-06-06 NOTE — PROGRESS NOTE ADULT - ASSESSMENT
62 year old male with CAD, Valvular disease ,s/p CABG/AVR, atrial fibrillation, IgA nephropathy, s/p nephrectomy and partial nephrectomy, ESRD on hemodialysis, hypertension, gout, arthritis, recent cardiac decompensation/arrest requiring pressor support, left pleural effusion, transferred to [facility] from [facility] for further evaluation and management of valvular/cardiac disease, found to have anemia, worsening thrombocytopenia and coagulopathy.  Anemia -multifactorial etiologies - Chronic renal failure, hemolytic anemia destruction from heart valves, possible occult blood loss while anticoagulated, iron deficiency and other nutritional deficiencies, - much improved with transfusions  Thrombocytopenia - destruction of platelets from valvular disease, medications, dialysis, possible immune related cause. Slightly lower today at 43,000. No clinical bleeding.   Qualitative platelet disorder due to aspirin, renal failure  Coagulopathy - due to warfarin, nutritional deficiency, ? lupus anticoagulant.  Plan: If possible, avoid platelet suppressing medications,  please check iron panel, ferritin, B12, folate levels, erythropoietin level, platelet antibody panel , JASS, lupus anticoagulant, cardiolipin and B2GPI antibody panels, send heparin antibody studies, guaiac stool. Continue Procrit during dialysis. Platelets on hold for OR.  For procedure today.

## 2017-06-06 NOTE — PROGRESS NOTE ADULT - RS GEN PE MLT RESP DETAILS PC
respirations non-labored/good air movement/breath sounds equal/clear to auscultation bilaterally/airway patent/normal

## 2017-06-06 NOTE — PROGRESS NOTE ADULT - SUBJECTIVE AND OBJECTIVE BOX
Date of surgery : 6/6/2017    Surgeon : Sloan     Anesthesia : [place]     Procedure : PVL closure X2 location with total of 3 devices     Airway :      Difficult Airway :                   [ ] Yes     [ x] No      ETT             Size :                         Lip Line :                           Ventilator setting :              [ x] ON          [ x] BS +           [x ] ETCO2 40      Mode: AC/ CMV (Assist Control/ Continuous Mandatory Ventilation)  RR (machine): 10  TV (machine): 500  FiO2: 40  PEEP: 5  ITime: 1  MAP: 11  PIP: 21          Lines :      [ ] PA catheter      [ x] Radial Arterial Line              [x  ] Right              [  ] Left       [ ] Femoral Arterial Line          [  ] Right              [  ] Left      [x ] Central Line   IJ                     [x  ] Right              [  ] Left      [ ] Femoral Central line            [  ] Right              [  ] Left          Intake     Drips :   [place] @ 0.3mcg/kg/min     IVF : 1200     Albumin :     Product :            [   ]  PRBC       [  ] Platelet     [   ] FFP          [   ] Cryoprecipitate                                  [   ]  Cell saver                                  [   ]  Hemostatic agent :                                  [   ]   Factors :       Output      EBL :      Urine : anuric      Antibiotics :   ICU Vital Signs Last 24 Hrs  T(C): 36.4, Max: 36.4 (06-07 @ 02:00)  T(F): 97.5, Max: 97.5 (06-07 @ 02:00)  HR: 104 (88 - 112)  BP: 88/51 (88/51 - 112/56)  BP(mean): 64 (64 - 93)  ABP: 134/52 (98/52 - 138/62)  ABP(mean): 82 (68 - 94)  RR: 18 (14 - 50)  SpO2: 100% (95% - 100%)    I&O's Summary  I & Os for 24h ending 06 Jun 2017 07:00  =============================================  IN: 680 ml / OUT: 1800 ml / NET: -1120 ml    I & Os for current day (as of 07 Jun 2017 03:12)  =============================================  IN: 845 ml / OUT: 2000 ml / NET: -1155 ml    ABG - ( 07 Jun 2017 01:01 )  pH: 7.46  /  pCO2: 34    /  pO2: 141   / HCO3: 24    / Base Excess: 0.7   /  SaO2: 99                            10.1   4.2   )-----------( 48       ( 07 Jun 2017 01:26 )             31.6     07 Jun 2017 01:27    138    |  98     |  29     ----------------------------<  78     4.2     |  24     |  4.70     Ca    8.7        07 Jun 2017 01:27  Phos  3.0       07 Jun 2017 01:27  Mg     1.8       07 Jun 2017 01:27    TPro  5.5    /  Alb  3.3    /  TBili  2.0    /  DBili  x      /  AST  31     /  ALT  39     /  AlkPhos  97     07 Jun 2017 01:27    PT/INR - ( 07 Jun 2017 01:27 )   PT: 13.9 sec;   INR: 1.25          PTT - ( 07 Jun 2017 01:27 )  PTT:34.4 sec  MEDICATIONS  (STANDING):  tamsulosin 0.4milliGRAM(s) Oral at bedtime  mirtazapine 15milliGRAM(s) Oral at bedtime  atorvastatin 40milliGRAM(s) Oral at bedtime  calcium acetate 667milliGRAM(s) Oral three times a day with meals  sevelamer hydrochloride 800milliGRAM(s) Oral daily  multivitamin 1Tablet(s) Oral daily  predniSONE   Tablet 5milliGRAM(s) Oral daily  sodium chloride 0.45%. 1000milliLiter(s) IV Continuous <Continuous>  heparin  Injectable 5000Unit(s) SubCutaneous every 8 hours  pantoprazole  Injectable 40milliGRAM(s) IV Push daily  insulin Infusion 2Unit(s)/Hr IV Continuous <Continuous>    CAD  Diastolic CHF  Gout  Aortic valve disease  Atrial fibrillation  ESRD (end stage renal disease) on dialysis  CAD (coronary artery disease)  HTN (hypertension)  Acute diastolic congestive heart failure, NYHA class 4  Paravalvular leak of prosthetic heart valve, initial encounter  Acute diastolic congestive heart failure, NYHA class 4  Chronic kidney disease-mineral and bone disorder  HTN (hypertension)  ESRD (end stage renal disease) on dialysis  Gout  Diastolic CHF  Thrombocytopenia  Atrial fibrillation  ESRD (end stage renal disease)  Aortic valve disease  Cardiac catheterization  H/O partial nephrectomy  AV fistula  S/P colon resection  S/P AVR  S/P CABG (coronary artery bypass graft)  H/O hernia repair      PHYSICAL EXAM:  Gen : intubated sedated   Pulmonary : decreased in the bases  Cardiovascular: S1 and S2, irregular , no M/G/R  Gastrointestinal: BS+, soft, NT/ND  Extremities: No peripheral edema  Vascular: 2+ peripheral pulses

## 2017-06-06 NOTE — PROGRESS NOTE ADULT - SUBJECTIVE AND OBJECTIVE BOX
The patient was seen and examined.      The patient is a 62y Male  with history of CAD, s/p CABG, Valvular disease, atrial fibrillation, HTN CHF chronic renal failure, on hemodialysis, chronic anemia admitted with CAD, Valvular disease  .         Allergies    No Known Allergies    Intolerances        Medications:  MEDICATIONS  (STANDING):  sodium chloride 0.9% lock flush 3milliLiter(s) IV Push every 8 hours  tamsulosin 0.4milliGRAM(s) Oral at bedtime  mirtazapine 15milliGRAM(s) Oral at bedtime  atorvastatin 40milliGRAM(s) Oral at bedtime  calcium acetate 667milliGRAM(s) Oral three times a day with meals  sevelamer hydrochloride 800milliGRAM(s) Oral daily  pantoprazole    Tablet 40milliGRAM(s) Oral before breakfast  multivitamin 1Tablet(s) Oral daily  predniSONE   Tablet 5milliGRAM(s) Oral daily  midodrine 5milliGRAM(s) Oral every other day  metoprolol 25milliGRAM(s) Oral daily  digoxin     Tablet 0.125milliGRAM(s) Oral daily    MEDICATIONS  (PRN):          Interval History: Patient tolerated blood transfusion without ill effects. He is comfortable. Scheduled for procedural intervention this afternoon. Has some vague discomfort superior chest.  The patient denies  SOB.  No nausea/vomiting/fevers/chills/night sweats.  Less fatigue, no headaches/dizziness.    No abdominal pain/diarrhea/constipation.  No melena or hematochezia.    No dysuria/hematuria.  No history of easy bruising/bleeding.  No gingival bleeding or epistaxis.  No leg pain or leg swelling.    ROS is otherwise negative.    PHYSICAL EXAM:    T(F): 97.2, Max: 99 (-05 @ 22:41)  HR: 90 (90 - 118)  BP: 106/57 (101/57 - 134/57)  RR: 16 (16 - 18)  SpO2: 99% (95% - 99%)  Wt(kg): --    Daily Height in cm: 182.9 (2017 04:44)    Daily Weight in k (2017 05:08)    Gen: well developed, thin, comfortable  HEENT: normocephalic/atraumatic,has conjunctival pallor, no scleral icterus, no oral thrush/mucosal bleeding/mucositis  Neck: supple, no masses, no JVD  Cardiovascular:IRR, nl S1S2, has murmurs  Respiratory: some rales at bases  Gastrointestinal: BS+, soft, NT/ND, no masses, no splenomegaly, no hepatomegaly, no evidence for ascites  Extremities: no clubbing/cyanosis, no edema, no calf tenderness, LUE fistula  Neurological: no focal deficits  Skin: no rash on visible skin, has ecchymoses,no petechiae  Lymph Nodes:  no significant peripheral adenopathy   Musculoskeletal:  full ROM  Psychiatric:  mood stable        Labs:                          11.2   5.0   )-----------( 43       ( 2017 07:35 )             35.8     CBC Full  -  ( 2017 07:35 )  WBC Count : 5.0 K/uL  Hemoglobin : 11.2 g/dL  Hematocrit : 35.8 %  Platelet Count - Automated : 43 K/uL  Mean Cell Volume : 98.9 fL  Mean Cell Hemoglobin : 30.9 pg  Mean Cell Hemoglobin Concentration : 31.3 g/dL  Auto Neutrophil # : x  Auto Lymphocyte # : x  Auto Monocyte # : x  Auto Eosinophil # : x  Auto Basophil # : x  Auto Neutrophil % : x  Auto Lymphocyte % : x  Auto Monocyte % : x  Auto Eosinophil % : x  Auto Basophil % : x    PT/INR - ( 2017 06:11 )   PT: 13.6 sec;   INR: 1.22          PTT - ( 2017 06:11 )  PTT:31.7 sec    06-    142  |  98  |  40<H>  ----------------------------<  71  3.9   |  29  |  6.40<H>    Ca    8.2<L>      2017 07:35  Phos  2.4     06-  Mg     2.0     06-06            Other Labs:    Cultures:    Pathology:    Imaging Studies:

## 2017-06-07 LAB
ALBUMIN SERPL ELPH-MCNC: 3.3 G/DL — SIGNIFICANT CHANGE UP (ref 3.3–5)
ALP SERPL-CCNC: 97 U/L — SIGNIFICANT CHANGE UP (ref 40–120)
ALT FLD-CCNC: 39 U/L — SIGNIFICANT CHANGE UP (ref 10–45)
ANA TITR SER: NEGATIVE — SIGNIFICANT CHANGE UP
ANION GAP SERPL CALC-SCNC: 16 MMOL/L — SIGNIFICANT CHANGE UP (ref 5–17)
ANION GAP SERPL CALC-SCNC: 16 MMOL/L — SIGNIFICANT CHANGE UP (ref 5–17)
APTT BLD: 34.4 SEC — SIGNIFICANT CHANGE UP (ref 27.5–37.4)
APTT BLD: 34.5 SEC — SIGNIFICANT CHANGE UP (ref 27.5–37.4)
AST SERPL-CCNC: 31 U/L — SIGNIFICANT CHANGE UP (ref 10–40)
B2 GLYCOPROT1 AB SER QL: NEGATIVE — SIGNIFICANT CHANGE UP
BILIRUB SERPL-MCNC: 2 MG/DL — HIGH (ref 0.2–1.2)
BUN SERPL-MCNC: 29 MG/DL — HIGH (ref 7–23)
BUN SERPL-MCNC: 37 MG/DL — HIGH (ref 7–23)
CALCIUM SERPL-MCNC: 8.3 MG/DL — LOW (ref 8.4–10.5)
CALCIUM SERPL-MCNC: 8.7 MG/DL — SIGNIFICANT CHANGE UP (ref 8.4–10.5)
CARDIOLIPIN AB SER-ACNC: NEGATIVE — SIGNIFICANT CHANGE UP
CHLORIDE SERPL-SCNC: 100 MMOL/L — SIGNIFICANT CHANGE UP (ref 96–108)
CHLORIDE SERPL-SCNC: 98 MMOL/L — SIGNIFICANT CHANGE UP (ref 96–108)
CO2 SERPL-SCNC: 23 MMOL/L — SIGNIFICANT CHANGE UP (ref 22–31)
CO2 SERPL-SCNC: 24 MMOL/L — SIGNIFICANT CHANGE UP (ref 22–31)
CREAT SERPL-MCNC: 4.7 MG/DL — HIGH (ref 0.5–1.3)
CREAT SERPL-MCNC: 5.7 MG/DL — HIGH (ref 0.5–1.3)
EPO SERPL-MCNC: 49.3 MIU/ML — HIGH (ref 2.6–18.5)
GAS PNL BLDA: SIGNIFICANT CHANGE UP
GLUCOSE SERPL-MCNC: 78 MG/DL — SIGNIFICANT CHANGE UP (ref 70–99)
GLUCOSE SERPL-MCNC: 94 MG/DL — SIGNIFICANT CHANGE UP (ref 70–99)
HCT VFR BLD CALC: 29.2 % — LOW (ref 39–50)
HCT VFR BLD CALC: 31.6 % — LOW (ref 39–50)
HGB BLD-MCNC: 10.1 G/DL — LOW (ref 13–17)
HGB BLD-MCNC: 9.3 G/DL — LOW (ref 13–17)
INR BLD: 1.25 — HIGH (ref 0.88–1.16)
INR BLD: 1.3 — HIGH (ref 0.88–1.16)
MAGNESIUM SERPL-MCNC: 1.8 MG/DL — SIGNIFICANT CHANGE UP (ref 1.6–2.6)
MAGNESIUM SERPL-MCNC: 1.8 MG/DL — SIGNIFICANT CHANGE UP (ref 1.6–2.6)
MCHC RBC-ENTMCNC: 30.4 PG — SIGNIFICANT CHANGE UP (ref 27–34)
MCHC RBC-ENTMCNC: 30.5 PG — SIGNIFICANT CHANGE UP (ref 27–34)
MCHC RBC-ENTMCNC: 31.8 G/DL — LOW (ref 32–36)
MCHC RBC-ENTMCNC: 32 G/DL — SIGNIFICANT CHANGE UP (ref 32–36)
MCV RBC AUTO: 95.2 FL — SIGNIFICANT CHANGE UP (ref 80–100)
MCV RBC AUTO: 95.7 FL — SIGNIFICANT CHANGE UP (ref 80–100)
PHOSPHATE SERPL-MCNC: 3 MG/DL — SIGNIFICANT CHANGE UP (ref 2.5–4.5)
PLATELET # BLD AUTO: 48 K/UL — LOW (ref 150–400)
PLATELET # BLD AUTO: 54 K/UL — LOW (ref 150–400)
POTASSIUM SERPL-MCNC: 4.2 MMOL/L — SIGNIFICANT CHANGE UP (ref 3.5–5.3)
POTASSIUM SERPL-MCNC: 5.3 MMOL/L — SIGNIFICANT CHANGE UP (ref 3.5–5.3)
POTASSIUM SERPL-SCNC: 4.2 MMOL/L — SIGNIFICANT CHANGE UP (ref 3.5–5.3)
POTASSIUM SERPL-SCNC: 5.3 MMOL/L — SIGNIFICANT CHANGE UP (ref 3.5–5.3)
PROT SERPL-MCNC: 5.5 G/DL — LOW (ref 6–8.3)
PROTHROM AB SERPL-ACNC: 13.9 SEC — HIGH (ref 9.8–12.7)
PROTHROM AB SERPL-ACNC: 14.5 SEC — HIGH (ref 9.8–12.7)
RBC # BLD: 3.05 M/UL — LOW (ref 4.2–5.8)
RBC # BLD: 3.32 M/UL — LOW (ref 4.2–5.8)
RBC # FLD: 22.2 % — HIGH (ref 10.3–16.9)
RBC # FLD: 22.2 % — HIGH (ref 10.3–16.9)
SODIUM SERPL-SCNC: 138 MMOL/L — SIGNIFICANT CHANGE UP (ref 135–145)
SODIUM SERPL-SCNC: 139 MMOL/L — SIGNIFICANT CHANGE UP (ref 135–145)
WBC # BLD: 3.9 K/UL — SIGNIFICANT CHANGE UP (ref 3.8–10.5)
WBC # BLD: 4.2 K/UL — SIGNIFICANT CHANGE UP (ref 3.8–10.5)
WBC # FLD AUTO: 3.9 K/UL — SIGNIFICANT CHANGE UP (ref 3.8–10.5)
WBC # FLD AUTO: 4.2 K/UL — SIGNIFICANT CHANGE UP (ref 3.8–10.5)

## 2017-06-07 PROCEDURE — 99291 CRITICAL CARE FIRST HOUR: CPT

## 2017-06-07 PROCEDURE — 71010: CPT | Mod: 26

## 2017-06-07 PROCEDURE — 93010 ELECTROCARDIOGRAM REPORT: CPT

## 2017-06-07 RX ORDER — DEXTROSE 50 % IN WATER 50 %
1 SYRINGE (ML) INTRAVENOUS ONCE
Qty: 0 | Refills: 0 | Status: DISCONTINUED | OUTPATIENT
Start: 2017-06-07 | End: 2017-06-08

## 2017-06-07 RX ORDER — DOCUSATE SODIUM 100 MG
100 CAPSULE ORAL THREE TIMES A DAY
Qty: 0 | Refills: 0 | Status: DISCONTINUED | OUTPATIENT
Start: 2017-06-07 | End: 2017-06-08

## 2017-06-07 RX ORDER — METOPROLOL TARTRATE 50 MG
12.5 TABLET ORAL
Qty: 0 | Refills: 0 | Status: DISCONTINUED | OUTPATIENT
Start: 2017-06-07 | End: 2017-06-08

## 2017-06-07 RX ORDER — DEXTROSE 50 % IN WATER 50 %
25 SYRINGE (ML) INTRAVENOUS ONCE
Qty: 0 | Refills: 0 | Status: DISCONTINUED | OUTPATIENT
Start: 2017-06-07 | End: 2017-06-08

## 2017-06-07 RX ORDER — ALBUMIN HUMAN 25 %
50 VIAL (ML) INTRAVENOUS ONCE
Qty: 0 | Refills: 0 | Status: COMPLETED | OUTPATIENT
Start: 2017-06-07 | End: 2017-06-06

## 2017-06-07 RX ORDER — FENTANYL CITRATE 50 UG/ML
25 INJECTION INTRAVENOUS ONCE
Qty: 0 | Refills: 0 | Status: DISCONTINUED | OUTPATIENT
Start: 2017-06-07 | End: 2017-06-07

## 2017-06-07 RX ORDER — ASPIRIN/CALCIUM CARB/MAGNESIUM 324 MG
81 TABLET ORAL DAILY
Qty: 0 | Refills: 0 | Status: DISCONTINUED | OUTPATIENT
Start: 2017-06-07 | End: 2017-06-08

## 2017-06-07 RX ORDER — PROPOFOL 10 MG/ML
5 INJECTION, EMULSION INTRAVENOUS
Qty: 1000 | Refills: 0 | Status: DISCONTINUED | OUTPATIENT
Start: 2017-06-07 | End: 2017-06-07

## 2017-06-07 RX ORDER — ACETAMINOPHEN 500 MG
1000 TABLET ORAL ONCE
Qty: 0 | Refills: 0 | Status: COMPLETED | OUTPATIENT
Start: 2017-06-07 | End: 2017-06-07

## 2017-06-07 RX ORDER — SODIUM CHLORIDE 9 MG/ML
3 INJECTION INTRAMUSCULAR; INTRAVENOUS; SUBCUTANEOUS EVERY 8 HOURS
Qty: 0 | Refills: 0 | Status: DISCONTINUED | OUTPATIENT
Start: 2017-06-07 | End: 2017-06-08

## 2017-06-07 RX ORDER — DEXTROSE 50 % IN WATER 50 %
12.5 SYRINGE (ML) INTRAVENOUS ONCE
Qty: 0 | Refills: 0 | Status: DISCONTINUED | OUTPATIENT
Start: 2017-06-07 | End: 2017-06-08

## 2017-06-07 RX ORDER — GLUCAGON INJECTION, SOLUTION 0.5 MG/.1ML
1 INJECTION, SOLUTION SUBCUTANEOUS ONCE
Qty: 0 | Refills: 0 | Status: DISCONTINUED | OUTPATIENT
Start: 2017-06-07 | End: 2017-06-08

## 2017-06-07 RX ORDER — SODIUM CHLORIDE 9 MG/ML
1000 INJECTION, SOLUTION INTRAVENOUS
Qty: 0 | Refills: 0 | Status: DISCONTINUED | OUTPATIENT
Start: 2017-06-07 | End: 2017-06-08

## 2017-06-07 RX ORDER — LIDOCAINE 4 G/100G
1 CREAM TOPICAL DAILY
Qty: 0 | Refills: 0 | Status: DISCONTINUED | OUTPATIENT
Start: 2017-06-07 | End: 2017-06-08

## 2017-06-07 RX ORDER — PANTOPRAZOLE SODIUM 20 MG/1
40 TABLET, DELAYED RELEASE ORAL
Qty: 0 | Refills: 0 | Status: DISCONTINUED | OUTPATIENT
Start: 2017-06-07 | End: 2017-06-08

## 2017-06-07 RX ORDER — ALPRAZOLAM 0.25 MG
0.25 TABLET ORAL DAILY
Qty: 0 | Refills: 0 | Status: DISCONTINUED | OUTPATIENT
Start: 2017-06-07 | End: 2017-06-08

## 2017-06-07 RX ORDER — PHENYLEPHRINE HYDROCHLORIDE 10 MG/ML
0.3 INJECTION INTRAVENOUS
Qty: 80 | Refills: 0 | Status: DISCONTINUED | OUTPATIENT
Start: 2017-06-07 | End: 2017-06-07

## 2017-06-07 RX ADMIN — Medication 100 MILLIGRAM(S): at 22:27

## 2017-06-07 RX ADMIN — FENTANYL CITRATE 25 MICROGRAM(S): 50 INJECTION INTRAVENOUS at 03:33

## 2017-06-07 RX ADMIN — ATORVASTATIN CALCIUM 40 MILLIGRAM(S): 80 TABLET, FILM COATED ORAL at 22:27

## 2017-06-07 RX ADMIN — Medication 667 MILLIGRAM(S): at 17:55

## 2017-06-07 RX ADMIN — Medication 81 MILLIGRAM(S): at 12:17

## 2017-06-07 RX ADMIN — Medication 100 MILLIGRAM(S): at 00:36

## 2017-06-07 RX ADMIN — SODIUM CHLORIDE 3 MILLILITER(S): 9 INJECTION INTRAMUSCULAR; INTRAVENOUS; SUBCUTANEOUS at 22:26

## 2017-06-07 RX ADMIN — SEVELAMER CARBONATE 800 MILLIGRAM(S): 2400 POWDER, FOR SUSPENSION ORAL at 12:15

## 2017-06-07 RX ADMIN — Medication 1 TABLET(S): at 12:15

## 2017-06-07 RX ADMIN — FENTANYL CITRATE 25 MICROGRAM(S): 50 INJECTION INTRAVENOUS at 03:36

## 2017-06-07 RX ADMIN — Medication 1000 MILLIGRAM(S): at 12:45

## 2017-06-07 RX ADMIN — HEPARIN SODIUM 5000 UNIT(S): 5000 INJECTION INTRAVENOUS; SUBCUTANEOUS at 06:44

## 2017-06-07 RX ADMIN — PANTOPRAZOLE SODIUM 40 MILLIGRAM(S): 20 TABLET, DELAYED RELEASE ORAL at 12:15

## 2017-06-07 RX ADMIN — Medication 667 MILLIGRAM(S): at 12:15

## 2017-06-07 RX ADMIN — TAMSULOSIN HYDROCHLORIDE 0.4 MILLIGRAM(S): 0.4 CAPSULE ORAL at 22:27

## 2017-06-07 RX ADMIN — Medication 0.25 MILLIGRAM(S): at 12:17

## 2017-06-07 RX ADMIN — MIRTAZAPINE 15 MILLIGRAM(S): 45 TABLET, ORALLY DISINTEGRATING ORAL at 22:29

## 2017-06-07 RX ADMIN — HEPARIN SODIUM 5000 UNIT(S): 5000 INJECTION INTRAVENOUS; SUBCUTANEOUS at 22:27

## 2017-06-07 RX ADMIN — LIDOCAINE 1 PATCH: 4 CREAM TOPICAL at 12:15

## 2017-06-07 RX ADMIN — Medication 12.5 MILLIGRAM(S): at 17:55

## 2017-06-07 RX ADMIN — Medication 400 MILLIGRAM(S): at 12:15

## 2017-06-07 NOTE — PROGRESS NOTE ADULT - ASSESSMENT
62 year old male with CAD, Valvular disease ,s/p CABG/AVR, atrial fibrillation, IgA nephropathy, s/p nephrectomy and partial nephrectomy, ESRD on hemodialysis, hypertension, gout, arthritis, recent cardiac decompensation/arrest requiring pressor support, left pleural effusion, transferred to [facility] from [facility] for further evaluation and management of valvular/cardiac disease, found to have anemia, worsening thrombocytopenia and coagulopathy.  Anemia -multifactorial etiologies - Chronic renal failure, hemolytic anemia destruction from heart valves, possible occult blood loss while anticoagulated, iron deficiency and other nutritional deficiencies, - much improved with transfusions  Thrombocytopenia - destruction of platelets from valvular disease, medications, dialysis, possible immune related cause (cardiolipin antibody screen negative, other studies pending). Holding at 48,000. No clinical bleeding.   Qualitative platelet disorder due to aspirin, renal failure  Coagulopathy - due to warfarin, nutritional deficiency, ? lupus anticoagulant.  Plan: If possible, avoid platelet suppressing medications,  please check iron panel, ferritin, B12, folate levels, erythropoietin level, platelet antibody panel , JASS, lupus anticoagulant, B2GPI antibody panel, send heparin antibody studies, guaiac stool. Continue Procrit during dialysis. Would only give platelets if  bleeding occurs. Clinical hemostasis appears adequate..

## 2017-06-07 NOTE — PROGRESS NOTE ADULT - SUBJECTIVE AND OBJECTIVE BOX
Patient seen and examined at bedside. Patient was last dialyzed 6/6 with UF of 2kg. Patient appears comfortable and is s/p paravalvular leak repair.     tamsulosin 0.4milliGRAM(s) at bedtime  mirtazapine 15milliGRAM(s) at bedtime  atorvastatin 40milliGRAM(s) at bedtime  calcium acetate 667milliGRAM(s) three times a day with meals  sevelamer hydrochloride 800milliGRAM(s) daily  multivitamin 1Tablet(s) daily  predniSONE   Tablet 5milliGRAM(s) daily  sodium chloride 0.45%. 1000milliLiter(s) <Continuous>  heparin  Injectable 5000Unit(s) every 8 hours  pantoprazole  Injectable 40milliGRAM(s) daily  metoprolol 12.5milliGRAM(s) two times a day  dextrose 5%. 1000milliLiter(s) <Continuous>  dextrose Gel 1Dose(s) once PRN  dextrose 50% Injectable 12.5Gram(s) once  dextrose 50% Injectable 25Gram(s) once  dextrose 50% Injectable 25Gram(s) once  glucagon  Injectable 1milliGRAM(s) once PRN    Allergies    No Known Allergies    Intolerances    T(C): , Max: 36.4 (06-07 @ 02:00)  T(F): , Max: 97.5 (06-07 @ 02:00)  HR: 104  BP: 114/67  BP(mean): 82  RR: 20  SpO2: 100%    I & Os for 24h ending 06-07 @ 07:00  =============================================  IN:    Packed Red Blood Cells: 450 ml    Platelets - Single Donor: 315 ml    sodium chloride 0.45%.: 90 ml    IV PiggyBack: 50 ml    phenylephrine   Infusion: 17.5 ml    Total IN: 922.5 ml  ---------------------------------------------  OUT:    Other: 2000 ml    Chest Tube: 1200 ml    Total OUT: 3200 ml  ---------------------------------------------  Total NET: -2277.5 ml    I & Os for current day (as of 06-07 @ 11:45)  =============================================  IN:    sodium chloride 0.45%.: 40 ml    Total IN: 40 ml  ---------------------------------------------  OUT:    Chest Tube: 60 ml    Total OUT: 60 ml  ---------------------------------------------  Total NET: -20 ml    LABS:                        10.1   4.2   )-----------( 48       ( 07 Jun 2017 01:26 )             31.6     06-07    138  |  98  |  29<H>  ----------------------------<  78  4.2   |  24  |  4.70<H>    Ca    8.7      07 Jun 2017 01:27  Phos  3.0     06-07  Mg     1.8     06-07    TPro  5.5<L>  /  Alb  3.3  /  TBili  2.0<H>  /  DBili  x   /  AST  31  /  ALT  39  /  AlkPhos  97  06-07    PT/INR - ( 07 Jun 2017 01:27 )   PT: 13.9 sec;   INR: 1.25       PTT - ( 07 Jun 2017 01:27 )  PTT:34.4 sec

## 2017-06-07 NOTE — PROGRESS NOTE ADULT - SUBJECTIVE AND OBJECTIVE BOX
The patient was seen and examined.      The patient is a 62y Male  with history of CAD, s/p CABG/AVR ESRD on hemodialysis, CHF, HTN, chronic anemia admitted with CAD and paravalvular leak.  .         Allergies    No Known Allergies    Intolerances        Medications:  MEDICATIONS  (STANDING):  tamsulosin 0.4milliGRAM(s) Oral at bedtime  mirtazapine 15milliGRAM(s) Oral at bedtime  atorvastatin 40milliGRAM(s) Oral at bedtime  calcium acetate 667milliGRAM(s) Oral three times a day with meals  sevelamer hydrochloride 800milliGRAM(s) Oral daily  multivitamin 1Tablet(s) Oral daily  predniSONE   Tablet 5milliGRAM(s) Oral daily  sodium chloride 0.45%. 1000milliLiter(s) IV Continuous <Continuous>  heparin  Injectable 5000Unit(s) SubCutaneous every 8 hours  pantoprazole  Injectable 40milliGRAM(s) IV Push daily  metoprolol 12.5milliGRAM(s) Oral two times a day  dextrose 5%. 1000milliLiter(s) IV Continuous <Continuous>  dextrose 50% Injectable 12.5Gram(s) IV Push once  dextrose 50% Injectable 25Gram(s) IV Push once  dextrose 50% Injectable 25Gram(s) IV Push once    MEDICATIONS  (PRN):  dextrose Gel 1Dose(s) Oral once PRN Blood Glucose LESS THAN 70 milliGRAM(s)/deciliter  glucagon  Injectable 1milliGRAM(s) IntraMuscular once PRN Glucose LESS THAN 70 milligrams/deciliter    heparin  Injectable 5000Unit(s) SubCutaneous every 8 hours        Interval History: The patient underwent repair of paravalvular leak. Received packed cells and platelets. States he is tired. Was dialyzed. Seems fairly comfortable.  The patient denies chest pain or SOB.  No nausea/vomiting/fevers/chills/night sweats.  Has fatigue, no headaches/dizziness.    No abdominal pain/diarrhea/constipation.  No melena or hematochezia.    No dysuria/hematuria.  No history of easy bruising/bleeding.  No gingival bleeding or epistaxis.  No leg pain or leg swelling.    ROS is otherwise negative.    PHYSICAL EXAM:    T(F): 97.2, Max: 97.5 (06-07 @ 02:00)  HR: 96 (86 - 112)  BP: 88/51 (88/51 - 109/53)  RR: 16 (14 - 50)  SpO2: 100% (99% - 100%)  Wt(kg): --    Daily     Daily Weight in k (2017 00:01)    Gen: well developed, thin male, comfortable  HEENT: normocephalic/atraumatic, has conjunctival pallor, no scleral icterus, no oral thrush/mucosal bleeding/mucositis  Neck: supple, no masses, no JVD, no bleeding from right sided central line.  Cardiovascular: IRR, nl S1S2, has murmurs  Respiratory: clear air entry anteriorly, decreased  breath sounds left base, has left chest tubel  Gastrointestinal: BS+, soft, NT/ND, no masses, no splenomegaly, no hepatomegaly, no evidence for ascites  Extremities: no clubbing/cyanosis, no edema, no calf tenderness  Neurological: no focal deficits  Skin: no rash on visible skin, excessive ecchymoses or petechiae  Lymph Nodes:  no significant peripheral adenopathy   Musculoskeletal:  full ROM  Psychiatric:  mood stable        Labs:                          10.1   4.2   )-----------( 48       ( 2017 01:26 )             31.6     CBC Full  -  ( 2017 01:26 )  WBC Count : 4.2 K/uL  Hemoglobin : 10.1 g/dL  Hematocrit : 31.6 %  Platelet Count - Automated : 48 K/uL  Mean Cell Volume : 95.2 fL  Mean Cell Hemoglobin : 30.4 pg  Mean Cell Hemoglobin Concentration : 32.0 g/dL  Auto Neutrophil # : x  Auto Lymphocyte # : x  Auto Monocyte # : x  Auto Eosinophil # : x  Auto Basophil # : x  Auto Neutrophil % : x  Auto Lymphocyte % : x  Auto Monocyte % : x  Auto Eosinophil % : x  Auto Basophil % : x    PT/INR - ( 2017 01:27 )   PT: 13.9 sec;   INR: 1.25          PTT - ( 2017 01:27 )  PTT:34.4 sec    -    138  |  98  |  29<H>  ----------------------------<  78  4.2   |  24  |  4.70<H>    Ca    8.7      2017 01:27  Phos  3.0     06-07  Mg     1.8     06-07    TPro  5.5<L>  /  Alb  3.3  /  TBili  2.0<H>  /  DBili  x   /  AST  31  /  ALT  39  /  AlkPhos  97  06-07          Other Labs:    Cultures:    Pathology:    Imaging Studies:

## 2017-06-07 NOTE — PROGRESS NOTE ADULT - ASSESSMENT
ESRD-s/p HD last night post-op  ASHD  MR/AI    Plan:    HD tiw  cardiac followup  CT-ICU protocol  supportive care    ERENDIRA Roche MD

## 2017-06-07 NOTE — PROGRESS NOTE ADULT - RS GEN PE MLT RESP DETAILS PC
normal/airway patent/breath sounds equal/good air movement/clear to auscultation bilaterally/respirations non-labored

## 2017-06-07 NOTE — PROGRESS NOTE ADULT - SUBJECTIVE AND OBJECTIVE BOX
62 year old male admitted for aortic insufficiency and mitral regurgitation. Possible cardiac revision. Renal consult requested because patient has ESRD and has been on tiw HD for past 6 years [facility] in [place].  lbs ( 67kg). HD MWF.     Underwent intravascular repair of valves on 6/6. Now in [facility]ICU-9E    PAST MEDICAL & SURGICAL HISTORY:  Diastolic CHF  Gout  Aortic valve disease  Atrial fibrillation  ESRD (end stage renal disease) on dialysis  CAD (coronary artery disease)  HTN (hypertension)  H/O partial nephrectomy  AV fistula  S/P colon resection  S/P AVR  S/P CABG (coronary artery bypass graft)  H/O hernia repair    MEDICATIONS  (STANDING):  sodium chloride 0.9% lock flush 3milliLiter(s) IV Push every 8 hours  tamsulosin 0.4milliGRAM(s) Oral at bedtime  mirtazapine 15milliGRAM(s) Oral at bedtime  atorvastatin 40milliGRAM(s) Oral at bedtime  calcium acetate 667milliGRAM(s) Oral three times a day with meals  sevelamer hydrochloride 800milliGRAM(s) Oral daily  pantoprazole    Tablet 40milliGRAM(s) Oral before breakfast  multivitamin 1Tablet(s) Oral daily  predniSONE   Tablet 5milliGRAM(s) Oral daily  midodrine 5milliGRAM(s) Oral daily    MEDICATIONS  (PRN):      Vital Signs Last 24 Hrs  T(C): 36.2, Max: 36.4 (06-07 @ 02:00)  T(F): 97.2, Max: 97.5 (06-07 @ 02:00)  HR: 96 (86 - 112)  BP: 88/51 (88/51 - 109/53)  BP(mean): 64 (64 - 90)  RR: 16 (14 - 50)  SpO2: 100% (99% - 100%)  awake, alert  chest clear  heart S1, S2  abd-benign  ext-LLA AVF                                                 10.1   4.2   )-----------( 48       ( 07 Jun 2017 01:26 )             31.6   06-07    138  |  98  |  29<H>  ----------------------------<  78  4.2   |  24  |  4.70<H>    Ca    8.7      07 Jun 2017 01:27  Phos  3.0     06-07  Mg     1.8     06-07    TPro  5.5<L>  /  Alb  3.3  /  TBili  2.0<H>  /  DBili  x   /  AST  31  /  ALT  39  /  AlkPhos  97  06-07

## 2017-06-07 NOTE — PROGRESS NOTE ADULT - SUBJECTIVE AND OBJECTIVE BOX
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 1-2+ TR.   medical management/stabilized - transferred to [facility] for ongoing management    to OR 6/6 - PVL closure with severe MR reportedly improved to mild to moderate  HD prior to procedure 6/6  reported transient BBB intraop; products given   extubated in short post-op period  infusions off; pigtail placed overnight for effusion - 1 L out    patient awake/alert and interactive - resistant to moving/getting OOB this am      PMHx includes but is not limited to:  Diastolic CHF  Gout  Aortic valve disease  Atrial fibrillation  ESRD (end stage renal disease) on dialysis  CAD (coronary artery disease)  HTN (hypertension)  H/O partial nephrectomy  AV fistula  S/P colon resection  S/P AVR  S/P CABG (coronary artery bypass graft)  H/O hernia repair    ICU Vital Signs Last 24 Hrs  T(C): 36.2, Max: 36.4 (06-07 @ 02:00)  T(F): 97.2, Max: 97.5 (06-07 @ 02:00)  HR: 106 (86 - 112) fib/flutter  BP: 114/67 (88/51 - 114/67)  BP(mean): 82 (64 - 82)  ABP: 106/42 (98/52 - 138/62)  ABP(mean): 62 (62 - 94)  RR: 19 (14 - 50)  SpO2: 98% (98% - 100%) on 3L NC --> RA    Qtts: None    I&O's Summary  I & Os for 24h ending 07 Jun 2017 07:00  =============================================  IN: 922.5 ml / OUT: 3200 ml / NET: -2277.5 ml    I & Os for current day (as of 07 Jun 2017 12:50)  =============================================  IN: 40 ml / OUT: 60 ml / NET: -20 ml    Physical Exam    Heart - regular (-)rub/gallop (+)Murmur appreciated  Lungs - CTA anterior (-)wheeze  Abd - (+)BS soft (-)r/r/g  Ext - warm to touch; no cyanosis/clubbing  Neuro - alert/oriented and interactive; moving all extremities  Skin - no rash    LABS:                        10.1   4.2   )-----------( 48       ( 07 Jun 2017 01:26 )             31.6     06-07    138  |  98  |  29<H>  ----------------------------<  78  4.2   |  24  |  4.70<H>    Ca    8.7      07 Jun 2017 01:27  Phos  3.0     06-07  Mg     1.8     06-07    TPro  5.5<L>  /  Alb  3.3  /  TBili  2.0<H>  /  DBili  x   /  AST  31  /  ALT  39  /  AlkPhos  97  06-07    PT/INR - ( 07 Jun 2017 01:27 )   PT: 13.9 sec;   INR: 1.25     PTT - ( 07 Jun 2017 01:27 )  PTT:34.4 sec    ABG - ( 07 Jun 2017 04:56 ) 7.46/36/176/99    RADIOLOGY & ADDITIONAL STUDIES: reviewed    Patient s/p PVL closure - improvement in leak reported    1. CV -   hemodynamically stable  chronic atrial fib/flutter - rate controlled   defer starting AC for now  start low dose Metoprolol to day  ECHO to assess AI ordered  ASA    2. Pulm   pigtail placement for large effusion - minimal output since  ambulate patient and anticipate removal later today  ambulation/incentive spirometry  titrated off supplemental oxygen and oxygenating well    maintain glycemic control  remove TLC/mariluz    d/w patient/staff and cardiology    I have spent/provided stated minutes of critical care time to this patient:  60 min

## 2017-06-08 VITALS
SYSTOLIC BLOOD PRESSURE: 109 MMHG | RESPIRATION RATE: 16 BRPM | DIASTOLIC BLOOD PRESSURE: 56 MMHG | HEART RATE: 102 BPM | OXYGEN SATURATION: 96 %

## 2017-06-08 PROCEDURE — 86850 RBC ANTIBODY SCREEN: CPT

## 2017-06-08 PROCEDURE — 97164 PT RE-EVAL EST PLAN CARE: CPT

## 2017-06-08 PROCEDURE — 87340 HEPATITIS B SURFACE AG IA: CPT

## 2017-06-08 PROCEDURE — C1887: CPT

## 2017-06-08 PROCEDURE — 86022 PLATELET ANTIBODIES: CPT

## 2017-06-08 PROCEDURE — 84132 ASSAY OF SERUM POTASSIUM: CPT

## 2017-06-08 PROCEDURE — 86706 HEP B SURFACE ANTIBODY: CPT

## 2017-06-08 PROCEDURE — 82746 ASSAY OF FOLIC ACID SERUM: CPT

## 2017-06-08 PROCEDURE — 86901 BLOOD TYPING SEROLOGIC RH(D): CPT

## 2017-06-08 PROCEDURE — P9047: CPT

## 2017-06-08 PROCEDURE — 83550 IRON BINDING TEST: CPT

## 2017-06-08 PROCEDURE — 93312 ECHO TRANSESOPHAGEAL: CPT

## 2017-06-08 PROCEDURE — P9016: CPT

## 2017-06-08 PROCEDURE — 86900 BLOOD TYPING SEROLOGIC ABO: CPT

## 2017-06-08 PROCEDURE — 97161 PT EVAL LOW COMPLEX 20 MIN: CPT

## 2017-06-08 PROCEDURE — 85027 COMPLETE CBC AUTOMATED: CPT

## 2017-06-08 PROCEDURE — C1760: CPT

## 2017-06-08 PROCEDURE — C1894: CPT

## 2017-06-08 PROCEDURE — 80053 COMPREHEN METABOLIC PANEL: CPT

## 2017-06-08 PROCEDURE — C1889: CPT

## 2017-06-08 PROCEDURE — 85730 THROMBOPLASTIN TIME PARTIAL: CPT

## 2017-06-08 PROCEDURE — 80061 LIPID PANEL: CPT

## 2017-06-08 PROCEDURE — 36415 COLL VENOUS BLD VENIPUNCTURE: CPT

## 2017-06-08 PROCEDURE — 84466 ASSAY OF TRANSFERRIN: CPT

## 2017-06-08 PROCEDURE — 82330 ASSAY OF CALCIUM: CPT

## 2017-06-08 PROCEDURE — 85045 AUTOMATED RETICULOCYTE COUNT: CPT

## 2017-06-08 PROCEDURE — 71010: CPT | Mod: 26

## 2017-06-08 PROCEDURE — 84443 ASSAY THYROID STIM HORMONE: CPT

## 2017-06-08 PROCEDURE — C1769: CPT

## 2017-06-08 PROCEDURE — 82803 BLOOD GASES ANY COMBINATION: CPT

## 2017-06-08 PROCEDURE — P9035: CPT

## 2017-06-08 PROCEDURE — 80048 BASIC METABOLIC PNL TOTAL CA: CPT

## 2017-06-08 PROCEDURE — 85670 THROMBIN TIME PLASMA: CPT

## 2017-06-08 PROCEDURE — 85025 COMPLETE CBC W/AUTO DIFF WBC: CPT

## 2017-06-08 PROCEDURE — 99238 HOSP IP/OBS DSCHRG MGMT 30/<: CPT

## 2017-06-08 PROCEDURE — 71045 X-RAY EXAM CHEST 1 VIEW: CPT

## 2017-06-08 PROCEDURE — 83880 ASSAY OF NATRIURETIC PEPTIDE: CPT

## 2017-06-08 PROCEDURE — 36430 TRANSFUSION BLD/BLD COMPNT: CPT

## 2017-06-08 PROCEDURE — 86803 HEPATITIS C AB TEST: CPT

## 2017-06-08 PROCEDURE — 86146 BETA-2 GLYCOPROTEIN ANTIBODY: CPT

## 2017-06-08 PROCEDURE — 71010: CPT | Mod: 26,77

## 2017-06-08 PROCEDURE — 84484 ASSAY OF TROPONIN QUANT: CPT

## 2017-06-08 PROCEDURE — 90935 HEMODIALYSIS ONE EVALUATION: CPT

## 2017-06-08 PROCEDURE — 85598 HEXAGNAL PHOSPH PLTLT NEUTRL: CPT

## 2017-06-08 PROCEDURE — 93005 ELECTROCARDIOGRAM TRACING: CPT

## 2017-06-08 PROCEDURE — 84295 ASSAY OF SERUM SODIUM: CPT

## 2017-06-08 PROCEDURE — 93010 ELECTROCARDIOGRAM REPORT: CPT

## 2017-06-08 PROCEDURE — 82607 VITAMIN B-12: CPT

## 2017-06-08 PROCEDURE — 83735 ASSAY OF MAGNESIUM: CPT

## 2017-06-08 PROCEDURE — 86923 COMPATIBILITY TEST ELECTRIC: CPT

## 2017-06-08 PROCEDURE — 97116 GAIT TRAINING THERAPY: CPT

## 2017-06-08 PROCEDURE — 82550 ASSAY OF CK (CPK): CPT

## 2017-06-08 PROCEDURE — 82668 ASSAY OF ERYTHROPOIETIN: CPT

## 2017-06-08 PROCEDURE — 85610 PROTHROMBIN TIME: CPT

## 2017-06-08 PROCEDURE — 83615 LACTATE (LD) (LDH) ENZYME: CPT

## 2017-06-08 PROCEDURE — 93306 TTE W/DOPPLER COMPLETE: CPT

## 2017-06-08 PROCEDURE — 84100 ASSAY OF PHOSPHORUS: CPT

## 2017-06-08 PROCEDURE — 82728 ASSAY OF FERRITIN: CPT

## 2017-06-08 PROCEDURE — 94150 VITAL CAPACITY TEST: CPT

## 2017-06-08 PROCEDURE — 83036 HEMOGLOBIN GLYCOSYLATED A1C: CPT

## 2017-06-08 PROCEDURE — 86038 ANTINUCLEAR ANTIBODIES: CPT

## 2017-06-08 PROCEDURE — 82553 CREATINE MB FRACTION: CPT

## 2017-06-08 RX ORDER — OXYCODONE HYDROCHLORIDE 5 MG/1
1 TABLET ORAL
Qty: 20 | Refills: 0 | OUTPATIENT
Start: 2017-06-08 | End: 2017-06-13

## 2017-06-08 RX ORDER — MIRTAZAPINE 45 MG/1
1 TABLET, ORALLY DISINTEGRATING ORAL
Qty: 0 | Refills: 0 | COMMUNITY

## 2017-06-08 RX ORDER — HYDRALAZINE HCL 50 MG
1 TABLET ORAL
Qty: 0 | Refills: 0 | COMMUNITY

## 2017-06-08 RX ORDER — OXYCODONE HYDROCHLORIDE 5 MG/1
1 TABLET ORAL
Qty: 20 | Refills: 0 | OUTPATIENT
Start: 2017-06-08 | End: 2017-06-15

## 2017-06-08 RX ORDER — PANTOPRAZOLE SODIUM 20 MG/1
1 TABLET, DELAYED RELEASE ORAL
Qty: 0 | Refills: 0 | COMMUNITY
Start: 2017-06-08

## 2017-06-08 RX ORDER — MIDODRINE HYDROCHLORIDE 2.5 MG/1
0 TABLET ORAL
Qty: 0 | Refills: 0 | COMMUNITY

## 2017-06-08 RX ORDER — SEVELAMER CARBONATE 2400 MG/1
1 POWDER, FOR SUSPENSION ORAL
Qty: 0 | Refills: 0 | COMMUNITY
Start: 2017-06-08

## 2017-06-08 RX ORDER — DIGOXIN 250 MCG
1 TABLET ORAL
Qty: 0 | Refills: 0 | COMMUNITY

## 2017-06-08 RX ORDER — ATORVASTATIN CALCIUM 80 MG/1
1 TABLET, FILM COATED ORAL
Qty: 0 | Refills: 0 | COMMUNITY
Start: 2017-06-08

## 2017-06-08 RX ORDER — CALCIUM ACETATE 667 MG
1 TABLET ORAL
Qty: 0 | Refills: 0 | COMMUNITY
Start: 2017-06-08

## 2017-06-08 RX ORDER — METOPROLOL TARTRATE 50 MG
0.5 TABLET ORAL
Qty: 30 | Refills: 0 | OUTPATIENT
Start: 2017-06-08 | End: 2017-07-08

## 2017-06-08 RX ORDER — CALCIUM ACETATE 667 MG
0 TABLET ORAL
Qty: 0 | Refills: 0 | COMMUNITY

## 2017-06-08 RX ORDER — OXYCODONE HYDROCHLORIDE 5 MG/1
1 TABLET ORAL
Qty: 120 | Refills: 0 | OUTPATIENT
Start: 2017-06-08 | End: 2017-06-28

## 2017-06-08 RX ORDER — PANTOPRAZOLE SODIUM 20 MG/1
1 TABLET, DELAYED RELEASE ORAL
Qty: 0 | Refills: 0 | COMMUNITY

## 2017-06-08 RX ORDER — ATORVASTATIN CALCIUM 80 MG/1
1 TABLET, FILM COATED ORAL
Qty: 0 | Refills: 0 | COMMUNITY

## 2017-06-08 RX ORDER — MIRTAZAPINE 45 MG/1
1 TABLET, ORALLY DISINTEGRATING ORAL
Qty: 0 | Refills: 0 | COMMUNITY
Start: 2017-06-08

## 2017-06-08 RX ORDER — METOPROLOL TARTRATE 50 MG
0.5 TABLET ORAL
Qty: 0 | Refills: 0 | COMMUNITY

## 2017-06-08 RX ORDER — ERYTHROPOIETIN 10000 [IU]/ML
6000 INJECTION, SOLUTION INTRAVENOUS; SUBCUTANEOUS ONCE
Qty: 0 | Refills: 0 | Status: COMPLETED | OUTPATIENT
Start: 2017-06-08 | End: 2017-06-08

## 2017-06-08 RX ORDER — DOCUSATE SODIUM 100 MG
1 CAPSULE ORAL
Qty: 90 | Refills: 0 | OUTPATIENT
Start: 2017-06-08 | End: 2017-07-08

## 2017-06-08 RX ORDER — KETOROLAC TROMETHAMINE 30 MG/ML
30 SYRINGE (ML) INJECTION ONCE
Qty: 0 | Refills: 0 | Status: DISCONTINUED | OUTPATIENT
Start: 2017-06-08 | End: 2017-06-08

## 2017-06-08 RX ORDER — WARFARIN SODIUM 2.5 MG/1
1 TABLET ORAL
Qty: 0 | Refills: 0 | COMMUNITY

## 2017-06-08 RX ORDER — ASPIRIN/CALCIUM CARB/MAGNESIUM 324 MG
1 TABLET ORAL
Qty: 0 | Refills: 0 | COMMUNITY

## 2017-06-08 RX ORDER — SEVELAMER CARBONATE 2400 MG/1
0 POWDER, FOR SUSPENSION ORAL
Qty: 0 | Refills: 0 | COMMUNITY

## 2017-06-08 RX ORDER — MIDODRINE HYDROCHLORIDE 2.5 MG/1
5 TABLET ORAL
Qty: 30 | Refills: 0 | OUTPATIENT
Start: 2017-06-08

## 2017-06-08 RX ADMIN — PANTOPRAZOLE SODIUM 40 MILLIGRAM(S): 20 TABLET, DELAYED RELEASE ORAL at 06:11

## 2017-06-08 RX ADMIN — Medication 30 MILLIGRAM(S): at 04:00

## 2017-06-08 RX ADMIN — SODIUM CHLORIDE 3 MILLILITER(S): 9 INJECTION INTRAMUSCULAR; INTRAVENOUS; SUBCUTANEOUS at 12:38

## 2017-06-08 RX ADMIN — Medication 12.5 MILLIGRAM(S): at 17:26

## 2017-06-08 RX ADMIN — SEVELAMER CARBONATE 800 MILLIGRAM(S): 2400 POWDER, FOR SUSPENSION ORAL at 12:27

## 2017-06-08 RX ADMIN — SODIUM CHLORIDE 3 MILLILITER(S): 9 INJECTION INTRAMUSCULAR; INTRAVENOUS; SUBCUTANEOUS at 06:13

## 2017-06-08 RX ADMIN — Medication 12.5 MILLIGRAM(S): at 08:19

## 2017-06-08 RX ADMIN — Medication 667 MILLIGRAM(S): at 17:26

## 2017-06-08 RX ADMIN — Medication 5 MILLIGRAM(S): at 06:11

## 2017-06-08 RX ADMIN — Medication 30 MILLIGRAM(S): at 04:30

## 2017-06-08 RX ADMIN — Medication 667 MILLIGRAM(S): at 08:18

## 2017-06-08 RX ADMIN — Medication 1 TABLET(S): at 09:21

## 2017-06-08 RX ADMIN — Medication 667 MILLIGRAM(S): at 12:27

## 2017-06-08 RX ADMIN — LIDOCAINE 1 PATCH: 4 CREAM TOPICAL at 00:35

## 2017-06-08 NOTE — DISCHARGE NOTE ADULT - CARE PLAN
See Care Plan under Instructions Principal Discharge DX:	Aortic valve disease  Goal:	to recover from surgery  Instructions for follow-up, activity and diet:	-Please follow up with Dr. Lisa on 6/14 @ 2:45pm.  The office is located at [facility], [facility], 4th floor. Call us with any questions #[phone].    -Walk daily as tolerated and use your incentive spirometer every hour.    -No driving or strenuous activity/exercise for 6 weeks, or until cleared by your surgeon.    -Gently clean your incisions with anti-bacterial soap and water, pat dry.  You may leave them open to air.    -Call your doctor if you have shortness of breath, chest pain not relieved by pain medication, dizziness, fever >101.5, or increased redness or drainage from incisions. Principal Discharge DX:	Aortic valve disease  Goal:	to recover from surgery  Instructions for follow-up, activity and diet:	-Please follow up with Dr. Lisa on 6/14 @ 2:45pm.  The office is located at [facility], [facility], 4th floor. Call us with any questions #[phone].    -Walk daily as tolerated and use your incentive spirometer every hour.    -No driving or strenuous activity/exercise for 6 weeks, or until cleared by your surgeon.    -Gently clean your incisions with anti-bacterial soap and water, pat dry.  You may leave them open to air.    -Call your doctor if you have shortness of breath, chest pain not relieved by pain medication, dizziness, fever >101.5, or increased redness or drainage from incisions. Principal Discharge DX:	Aortic valve disease  Goal:	to recover from surgery  Instructions for follow-up, activity and diet:	-Please follow up with Dr. Lisa on 6/14 @ 2:45pm.  The office is located at the [facility], #[phone]    -Walk daily as tolerated and use your incentive spirometer every hour.    -No driving or strenuous activity/exercise for 6 weeks, or until cleared by your surgeon.    -Gently clean your incisions with anti-bacterial soap and water, pat dry.  You may leave them open to air.    -Call your doctor if you have shortness of breath, chest pain not relieved by pain medication, dizziness, fever >101.5, or increased redness or drainage from incisions. Principal Discharge DX:	Aortic valve disease  Goal:	to recover from surgery  Instructions for follow-up, activity and diet:	-Please follow up with Dr. Lisa on 6/14 @ 2:45pm.  The office is located at the [facility], #[phone]    -Walk daily as tolerated and use your incentive spirometer every hour.    -No driving or strenuous activity/exercise for 6 weeks, or until cleared by your surgeon.    -Gently clean your incisions with anti-bacterial soap and water, pat dry.  You may leave them open to air.    -Call your doctor if you have shortness of breath, chest pain not relieved by pain medication, dizziness, fever >101.5, or increased redness or drainage from incisions. Principal Discharge DX:	Aortic valve disease  Goal:	to recover from surgery  Instructions for follow-up, activity and diet:	-Please follow up with Dr. Lisa on 6/14 @ 2:45pm.  The office is located at the [facility], #[phone]    -Walk daily as tolerated and use your incentive spirometer every hour.    -No driving or strenuous activity/exercise for 6 weeks, or until cleared by your surgeon.    -Gently clean your incisions with anti-bacterial soap and water, pat dry.  You may leave them open to air.    -Call your doctor if you have shortness of breath, chest pain not relieved by pain medication, dizziness, fever >101.5, or increased redness or drainage from incisions.

## 2017-06-08 NOTE — DISCHARGE NOTE ADULT - PLAN OF CARE
to recover from surgery -Please follow up with Dr. Lisa on 6/14 @ 2:45pm.  The office is located at [facility], [facility], 4th floor. Call us with any questions #[phone].    -Walk daily as tolerated and use your incentive spirometer every hour.    -No driving or strenuous activity/exercise for 6 weeks, or until cleared by your surgeon.    -Gently clean your incisions with anti-bacterial soap and water, pat dry.  You may leave them open to air.    -Call your doctor if you have shortness of breath, chest pain not relieved by pain medication, dizziness, fever >101.5, or increased redness or drainage from incisions. -Please follow up with Dr. Lisa on 6/14 @ 2:45pm.  The office is located at the [facility], #[phone]    -Walk daily as tolerated and use your incentive spirometer every hour.    -No driving or strenuous activity/exercise for 6 weeks, or until cleared by your surgeon.    -Gently clean your incisions with anti-bacterial soap and water, pat dry.  You may leave them open to air.    -Call your doctor if you have shortness of breath, chest pain not relieved by pain medication, dizziness, fever >101.5, or increased redness or drainage from incisions.

## 2017-06-08 NOTE — PROGRESS NOTE ADULT - PROBLEM SELECTOR PLAN 4
HD on M/W/F  Prio to the dialysis, patient will receive midodrine 5mg.
hematology consult to evaluate for this admission
Continue sevelamer  Continue calcium acetate 667 mg TID

## 2017-06-08 NOTE — PROGRESS NOTE ADULT - SUBJECTIVE AND OBJECTIVE BOX
62 year old male admitted for aortic insufficiency and mitral regurgitation. Possible cardiac revision. Renal consult requested because patient has ESRD and has been on tiw HD for past 6 years [facility] [place].  lbs ( 67kg). HD MWF.     Underwent intravascular repair of valves on 6/6. Now in CTICU-9E    PAST MEDICAL & SURGICAL HISTORY:  Diastolic CHF  Gout  Aortic valve disease  Atrial fibrillation  ESRD (end stage renal disease) on dialysis  CAD (coronary artery disease)  HTN (hypertension)  H/O partial nephrectomy  AV fistula  S/P colon resection  S/P AVR  S/P CABG (coronary artery bypass graft)  H/O hernia repair    MEDICATIONS  (STANDING):  tamsulosin 0.4milliGRAM(s) Oral at bedtime  mirtazapine 15milliGRAM(s) Oral at bedtime  atorvastatin 40milliGRAM(s) Oral at bedtime  calcium acetate 667milliGRAM(s) Oral three times a day with meals  sevelamer hydrochloride 800milliGRAM(s) Oral daily  multivitamin 1Tablet(s) Oral daily  predniSONE   Tablet 5milliGRAM(s) Oral daily  sodium chloride 0.45%. 1000milliLiter(s) IV Continuous <Continuous>  metoprolol 12.5milliGRAM(s) Oral two times a day  dextrose 5%. 1000milliLiter(s) IV Continuous <Continuous>  dextrose 50% Injectable 12.5Gram(s) IV Push once  dextrose 50% Injectable 25Gram(s) IV Push once  dextrose 50% Injectable 25Gram(s) IV Push once  lidocaine   Patch 1Patch Transdermal daily  pantoprazole    Tablet 40milliGRAM(s) Oral before breakfast  sodium chloride 0.9% lock flush 3milliLiter(s) IV Push every 8 hours  docusate sodium 100milliGRAM(s) Oral three times a day    MEDICATIONS  (PRN):  dextrose Gel 1Dose(s) Oral once PRN Blood Glucose LESS THAN 70 milliGRAM(s)/deciliter  glucagon  Injectable 1milliGRAM(s) IntraMuscular once PRN Glucose LESS THAN 70 milligrams/deciliter  ALPRAZolam 0.25milliGRAM(s) Oral daily PRN anxiety  oxyCODONE  5 mG/acetaminophen 325 mG 1Tablet(s) Oral every 4 hours PRN Mild Pain (1 - 3)      Vital Signs Last 24 Hrs  T(C): 36.9, Max: 36.9 (06-07 @ 17:47)  T(F): 98.4, Max: 98.5 (06-07 @ 17:47)  HR: 106 (92 - 118)  BP: 100/61 (98/56 - 110/61)  BP(mean): 76 (73 - 79)  RR: 16 (16 - 18)  SpO2: 97% (94% - 98%)  awake, alert  chest clear  heart S1, S2  abd-benign  ext-LLA AVF                           9.3    3.9   )-----------( 54       ( 07 Jun 2017 13:55 )             29.2   06-07    139  |  100  |  37<H>  ----------------------------<  94  5.3   |  23  |  5.70<H>    Ca    8.3<L>      07 Jun 2017 13:55  Phos  3.0     06-07  Mg     1.8     06-07    TPro  5.5<L>  /  Alb  3.3  /  TBili  2.0<H>  /  DBili  x   /  AST  31  /  ALT  39  /  AlkPhos  97  06-07

## 2017-06-08 NOTE — PROGRESS NOTE ADULT - PROBLEM SELECTOR PROBLEM 1
Aortic valve disease
Aortic valve disease
ESRD (end stage renal disease) on dialysis
Thrombocytopenia
ESRD (end stage renal disease)

## 2017-06-08 NOTE — PROGRESS NOTE ADULT - RS GEN PE MLT RESP DETAILS PC
respirations non-labored/airway patent/clear to auscultation bilaterally/normal/breath sounds equal/good air movement

## 2017-06-08 NOTE — PROGRESS NOTE ADULT - PROBLEM SELECTOR PLAN 2
Rate control with HR at 60-70's  On coumadin at home.  holding due to thrombocytopenia
Renal service for HD in patient service
Continue metoprolol 12.5 mg qdaily

## 2017-06-08 NOTE — DISCHARGE NOTE ADULT - PATIENT PORTAL LINK FT
“You can access the Follow[facility] Patient Portal, offered by [facility], by registering with the following website: [email or web address]”

## 2017-06-08 NOTE — DISCHARGE NOTE ADULT - MEDICATION SUMMARY - MEDICATIONS TO TAKE
I will START or STAY ON the medications listed below when I get home from the hospital:    predniSONE 10 mg oral tablet  -- 1 tab(s) by mouth once a day  -- Indication: For Steroid    acetaminophen-oxycodone 325 mg-5 mg oral tablet  -- 1 tab(s) by mouth every 4 hours, As needed, Mild Pain (1 - 3) MDD:4  -- Indication: For pain control     Flomax 0.4 mg oral capsule  -- 1 cap(s) by mouth once a day  -- Indication: For BPH    amiodarone 200 mg oral tablet  -- 1 tab(s) by mouth once a day  -- Indication: For Atrial fibrillation    mirtazapine 15 mg oral tablet  -- 1 tab(s) by mouth once a day (at bedtime)  -- Indication: For Antidepressant    atorvastatin 40 mg oral tablet  -- 1 tab(s) by mouth once a day (at bedtime)  -- Indication: For High cholesterol     ALPRAZolam 0.25 mg oral tablet  --  by mouth   -- Indication: For Anxiety     metoprolol tartrate 25 mg oral tablet  -- 0.5 tab(s) by mouth once a day  -- Indication: For Heart rate/ blood pressure    midodrine 5 mg oral tablet  -- 5 milligram(s) by mouth once a day with dialysis   -- Indication: For To elevate blood pressure    Renagel 800 mg oral tablet  --  by mouth   -- Indication: For renal supplement    calcium acetate 667 mg oral capsule  --  by mouth   -- Indication: For renal supplement    pantoprazole 40 mg oral delayed release tablet  -- 1 tab(s) by mouth once a day  -- Indication: For GI health     Multiple Vitamins oral tablet  -- 1 tab(s) by mouth once a day  -- Indication: For multivitamin I will START or STAY ON the medications listed below when I get home from the hospital:    predniSONE 10 mg oral tablet  -- 1 tab(s) by mouth once a day  -- Indication: For Steroid    acetaminophen-oxycodone 325 mg-5 mg oral tablet  -- 1 tab(s) by mouth every 4 hours, As needed, Mild Pain (1 - 3) MDD:4  -- Indication: For pain control     Flomax 0.4 mg oral capsule  -- 1 cap(s) by mouth once a day  -- Indication: For BPH    amiodarone 200 mg oral tablet  -- 1 tab(s) by mouth once a day  -- Indication: For Atrial fibrillation    mirtazapine 15 mg oral tablet  -- 1 tab(s) by mouth once a day (at bedtime)  -- Indication: For Antidepressant    atorvastatin 40 mg oral tablet  -- 1 tab(s) by mouth once a day (at bedtime)  -- Indication: For High cholesterol     ALPRAZolam 0.25 mg oral tablet  --  by mouth   -- Indication: For Anxiety     metoprolol tartrate 25 mg oral tablet  -- 0.5 tab(s) by mouth 2 times a day  -- It is very important that you take or use this exactly as directed.  Do not skip doses or discontinue unless directed by your doctor.  May cause drowsiness.  Alcohol may intensify this effect.  Use care when operating dangerous machinery.  Some non-prescription drugs may aggravate your condition.  Read all labels carefully.  If a warning appears, check with your doctor before taking.  Take with food or milk.  This drug may impair the ability to drive or operate machinery.  Use care until you become familiar with its effects.    -- Indication: For Heart rate/ blood pressure I will START or STAY ON the medications listed below when I get home from the hospital:    predniSONE 10 mg oral tablet  -- 1 tab(s) by mouth once a day  -- Indication: For Steroid    acetaminophen-oxycodone 325 mg-5 mg oral tablet  -- 1 tab(s) by mouth every 4 hours, As needed, Mild Pain (1 - 3) MDD:4  -- Indication: For pain control     Flomax 0.4 mg oral capsule  -- 1 cap(s) by mouth once a day  -- Indication: For BPH    amiodarone 200 mg oral tablet  -- 1 tab(s) by mouth once a day  -- Indication: For Atrial fibrillation    atorvastatin 40 mg oral tablet  -- 1 tab(s) by mouth once a day (at bedtime)  -- Indication: For High cholesterol     ALPRAZolam 0.25 mg oral tablet  --  by mouth   -- Indication: For Anxiety     metoprolol tartrate 25 mg oral tablet  -- 0.5 tab(s) by mouth 2 times a day  -- It is very important that you take or use this exactly as directed.  Do not skip doses or discontinue unless directed by your doctor.  May cause drowsiness.  Alcohol may intensify this effect.  Use care when operating dangerous machinery.  Some non-prescription drugs may aggravate your condition.  Read all labels carefully.  If a warning appears, check with your doctor before taking.  Take with food or milk.  This drug may impair the ability to drive or operate machinery.  Use care until you become familiar with its effects.    -- Indication: For Heart rate/ blood pressure    calcium acetate 667 mg oral tablet  -- 1 tab(s) by mouth once a day  -- Indication: For renal disease     sevelamer hydrochloride 800 mg oral tablet  -- 1 tab(s) by mouth once a day  -- Indication: For renal disease    pantoprazole 40 mg oral delayed release tablet  -- 1 tab(s) by mouth once a day (before a meal)  -- Indication: For GI health     Multiple Vitamins oral tablet  -- 1 tab(s) by mouth once a day  -- Indication: For multivitamin

## 2017-06-08 NOTE — DISCHARGE NOTE ADULT - MEDICATION SUMMARY - MEDICATIONS TO STOP TAKING
I will STOP taking the medications listed below when I get home from the hospital:    aspirin 81 mg oral tablet  -- 1 tab(s) by mouth once a day    digoxin 125 mcg (0.125 mg) oral tablet  -- 1 tab(s) by mouth once a day    hydrALAZINE 50 mg oral tablet  -- 1 tab(s) by mouth once a day    Coumadin 2.5 mg oral tablet  -- 1 tab(s) by mouth once a day

## 2017-06-08 NOTE — PROGRESS NOTE ADULT - PROBLEM SELECTOR PLAN 3
Hematologist is consulted   in the meantime, will continue to monitor for sings of bleedings.
Rate control
Patient is planned for paravalvular leak repair. Management as per primary team.
continue calcium acetate 667mg TID with meals

## 2017-06-08 NOTE — PROGRESS NOTE ADULT - SUBJECTIVE AND OBJECTIVE BOX
The patient was seen and examined.      The patient is a 62y Male  with history of CAD, Valvular disease, s/p CABG/AVR, paravalvular leak, atrial fibrillation, ESRD on hemodialysis, chronic anemia admitted with CAD,  .         Allergies    No Known Allergies    Intolerances        Medications:  MEDICATIONS  (STANDING):  tamsulosin 0.4milliGRAM(s) Oral at bedtime  mirtazapine 15milliGRAM(s) Oral at bedtime  atorvastatin 40milliGRAM(s) Oral at bedtime  calcium acetate 667milliGRAM(s) Oral three times a day with meals  sevelamer hydrochloride 800milliGRAM(s) Oral daily  multivitamin 1Tablet(s) Oral daily  predniSONE   Tablet 5milliGRAM(s) Oral daily  sodium chloride 0.45%. 1000milliLiter(s) IV Continuous <Continuous>  heparin  Injectable 5000Unit(s) SubCutaneous every 8 hours  metoprolol 12.5milliGRAM(s) Oral two times a day  dextrose 5%. 1000milliLiter(s) IV Continuous <Continuous>  dextrose 50% Injectable 12.5Gram(s) IV Push once  dextrose 50% Injectable 25Gram(s) IV Push once  dextrose 50% Injectable 25Gram(s) IV Push once  lidocaine   Patch 1Patch Transdermal daily  pantoprazole    Tablet 40milliGRAM(s) Oral before breakfast  sodium chloride 0.9% lock flush 3milliLiter(s) IV Push every 8 hours  docusate sodium 100milliGRAM(s) Oral three times a day  epoetin thor Injectable 6000Unit(s) IV Push once    MEDICATIONS  (PRN):  dextrose Gel 1Dose(s) Oral once PRN Blood Glucose LESS THAN 70 milliGRAM(s)/deciliter  glucagon  Injectable 1milliGRAM(s) IntraMuscular once PRN Glucose LESS THAN 70 milligrams/deciliter  ALPRAZolam 0.25milliGRAM(s) Oral daily PRN anxiety  oxyCODONE  5 mG/acetaminophen 325 mG 1Tablet(s) Oral every 4 hours PRN Mild Pain (1 - 3)    heparin  Injectable 5000Unit(s) SubCutaneous every 8 hours        Interval History: The patient is s/p valve repair. He is awake, alert, and in good spirits.    The patient denies chest pain or SOB.  No nausea/vomiting/fevers/chills/night sweats.  No fatigue, headaches/dizziness.  Appetite is stable.  No abdominal pain/diarrhea/constipation.  No melena or hematochezia.    No dysuria/hematuria.  No history of easy bruising/bleeding.  No gingival bleeding or epistaxis.  No leg pain or leg swelling.    ROS is otherwise negative.    PHYSICAL EXAM:    T(F): 98.2, Max: 98.5 (06-07 @ 17:47)  HR: 92 (88 - 118)  BP: 98/56 (98/56 - 114/67)  RR: 16 (15 - 20)  SpO2: 96% (94% - 100%)  Wt(kg): --    Daily     Daily     Gen: well developed, thin male, comfortable  HEENT: normocephalic/atraumatic, has conjunctival pallor, no scleral icterus, no oral thrush/mucosal bleeding/mucositis  Neck: supple, no masses, no JVD  Cardiovascular:IRR, nl S1S2, has murmurs  Respiratory: better breath sounds at left base, chest tube out.  Gastrointestinal: BS+, soft, NT/ND, no masses, no splenomegaly, no hepatomegaly, no evidence for ascites  Extremities: no clubbing/cyanosis, no edema, no calf tenderness  Neurological: no focal deficits  Skin: nexcoriations on lower exremities, no excessive ecchymoses or petechiae  Lymph Nodes:  no significant peripheral adenopathy   Musculoskeletal:  full ROM  Psychiatric:  mood stable        Labs:                          9.3    3.9   )-----------( 54       ( 07 Jun 2017 13:55 )             29.2     CBC Full  -  ( 07 Jun 2017 13:55 )  WBC Count : 3.9 K/uL  Hemoglobin : 9.3 g/dL  Hematocrit : 29.2 %  Platelet Count - Automated : 54 K/uL  Mean Cell Volume : 95.7 fL  Mean Cell Hemoglobin : 30.5 pg  Mean Cell Hemoglobin Concentration : 31.8 g/dL  Auto Neutrophil # : x  Auto Lymphocyte # : x  Auto Monocyte # : x  Auto Eosinophil # : x  Auto Basophil # : x  Auto Neutrophil % : x  Auto Lymphocyte % : x  Auto Monocyte % : x  Auto Eosinophil % : x  Auto Basophil % : x    PT/INR - ( 07 Jun 2017 13:55 )   PT: 14.5 sec;   INR: 1.30          PTT - ( 07 Jun 2017 13:55 )  PTT:34.5 sec    06-07    139  |  100  |  37<H>  ----------------------------<  94  5.3   |  23  |  5.70<H>    Ca    8.3<L>      07 Jun 2017 13:55  Phos  3.0     06-07  Mg     1.8     06-07    TPro  5.5<L>  /  Alb  3.3  /  TBili  2.0<H>  /  DBili  x   /  AST  31  /  ALT  39  /  AlkPhos  97  06-07          Other Labs:    Cultures:    Pathology:    Imaging Studies:

## 2017-06-08 NOTE — PROGRESS NOTE ADULT - ASSESSMENT
ESRD  ASHD  MR/AI  s/p valvular repair    Plan:    HD tiw  cardiac followup  CT-ICU protocol  supportive care    ERENDIRA Roche MD

## 2017-06-08 NOTE — PROGRESS NOTE ADULT - ASSESSMENT
62 year old male with CAD, Valvular disease ,s/p CABG/AVR, atrial fibrillation, IgA nephropathy, s/p nephrectomy and partial nephrectomy, ESRD on hemodialysis, hypertension, gout, arthritis, recent cardiac decompensation/arrest requiring pressor support, left pleural effusion, transferred to [facility] from [facility] for further evaluation and management of valvular/cardiac disease, found to have anemia, worsening thrombocytopenia and coagulopathy.  Anemia -multifactorial etiologies - Chronic renal failure, hemolytic anemia destruction from heart valves, possible occult blood loss while anticoagulated, iron deficiency and other nutritional deficiencies, - much improved with transfusions and stable post-operatively.  Thrombocytopenia - destruction of platelets from valvular disease, medications, dialysis, possible immune related cause (cardiolipin antibody screen negative, B2GP1  antibody negative, other studies pending). Holding at 54,000. No clinical bleeding.   Qualitative platelet disorder due to aspirin, renal failure  Coagulopathy - due to warfarin, nutritional deficiency, ? lupus anticoagulant.  Plan: Monitor CBC. If possible, avoid platelet suppressing medications,  platelet antibody panel ,  lupus anticoagulant, . Continue Procrit during dialysis .  Clinical hemostasis appears adequate..

## 2017-06-08 NOTE — PROGRESS NOTE ADULT - SUBJECTIVE AND OBJECTIVE BOX
Patient seen and examined at bedside. Patient was last dialyzed 6/6 with UF of 2kg. Patient appears comfortable. Patient is s/p paravalvular leak repair     tamsulosin 0.4milliGRAM(s) at bedtime  mirtazapine 15milliGRAM(s) at bedtime  atorvastatin 40milliGRAM(s) at bedtime  calcium acetate 667milliGRAM(s) three times a day with meals  sevelamer hydrochloride 800milliGRAM(s) daily  multivitamin 1Tablet(s) daily  predniSONE   Tablet 5milliGRAM(s) daily  sodium chloride 0.45%. 1000milliLiter(s) <Continuous>  metoprolol 12.5milliGRAM(s) two times a day  dextrose 5%. 1000milliLiter(s) <Continuous>  dextrose Gel 1Dose(s) once PRN  dextrose 50% Injectable 12.5Gram(s) once  dextrose 50% Injectable 25Gram(s) once  dextrose 50% Injectable 25Gram(s) once  glucagon  Injectable 1milliGRAM(s) once PRN  lidocaine   Patch 1Patch daily  ALPRAZolam 0.25milliGRAM(s) daily PRN  pantoprazole    Tablet 40milliGRAM(s) before breakfast  sodium chloride 0.9% lock flush 3milliLiter(s) every 8 hours  docusate sodium 100milliGRAM(s) three times a day  oxyCODONE  5 mG/acetaminophen 325 mG 1Tablet(s) every 4 hours PRN  epoetin thor Injectable 6000Unit(s) once    Allergies    No Known Allergies    Intolerances    T(C): , Max: 36.9 (06-07 @ 17:47)  T(F): , Max: 98.5 (06-07 @ 17:47)  HR: 106  BP: 100/61  BP(mean): 76  RR: 16  SpO2: 97%    I & Os for current day (as of 06-08 @ 13:43)  =============================================  IN:    Oral Fluid: 380 ml    sodium chloride 0.45%.: 60 ml    Total IN: 440 ml  ---------------------------------------------  OUT:    Chest Tube: 150 ml    Total OUT: 150 ml  ---------------------------------------------  Total NET: 290 ml    LABS:                        9.3    3.9   )-----------( 54       ( 07 Jun 2017 13:55 )             29.2     06-07    139  |  100  |  37<H>  ----------------------------<  94  5.3   |  23  |  5.70<H>    Ca    8.3<L>      07 Jun 2017 13:55  Phos  3.0     06-07  Mg     1.8     06-07    TPro  5.5<L>  /  Alb  3.3  /  TBili  2.0<H>  /  DBili  x   /  AST  31  /  ALT  39  /  AlkPhos  97  06-07    PT/INR - ( 07 Jun 2017 13:55 )   PT: 14.5 sec;   INR: 1.30       PTT - ( 07 Jun 2017 13:55 )  PTT:34.5 sec

## 2017-06-08 NOTE — DISCHARGE NOTE ADULT - CARE PROVIDER_API CALL
Kelvin Lisa), Cardiology; Internal Medicine; Interventional Cardiology  [address] [place] [address]  Phone: [phone]  Fax: [phone]

## 2017-06-08 NOTE — PROGRESS NOTE ADULT - PROVIDER SPECIALTY LIST ADULT
CT Surgery
Critical Care
Critical Care
Heme/Onc
Nephrology
Heme/Onc
Nephrology

## 2017-06-08 NOTE — PROGRESS NOTE ADULT - PROBLEM SELECTOR PROBLEM 3
Atrial fibrillation
Thrombocytopenia
Aortic valve disease

## 2017-06-08 NOTE — DISCHARGE NOTE ADULT - HOSPITAL COURSE
62M with PMH ESRD on HD, IGA nephropathy, CHF, JADA, gout, HTN, CAD s/p CABG 2017, AVR(b), IVC filter, Afib on coumadin at home, who was admitted to OSH with heart failure, then transferred to [facility] for surgical intervention of newly found AI and MR. Pt had PVL closure on 6/6, with plans for possible mitral intervention in the future. Pt did well post op, extubated on time. Pt with thrombocytopenia post op, Dr. Regan of hematology consulted, platelets uptrending on discharge. HD continued as schedule, but did not require HD on 6/8, he will resume his outpt schedule on 6/9. Pt continued in afib, will go home with beta blocker. AC is being held 2/2 thrombocytopenia, with plans to follow up next week for possible resumption.  EKG showing widened QRS post op, which has resolved day of discharge. Pt is now stable and ready for discharge home.

## 2017-06-08 NOTE — PROGRESS NOTE ADULT - SUBJECTIVE AND OBJECTIVE BOX
Patient discussed on morning rounds with Dr. Lisa    Operation / Date: PVL closure for severe AI on 6/6    Surgeon: Dr. Lisa    Referring Physician:    SUBJECTIVE ASSESSMENT:  62y Male     Hospital Course:    Vital Signs Last 24 Hrs  T(C): 36.9, Max: 36.9 (06-07 @ 17:47)  T(F): 98.4, Max: 98.5 (06-07 @ 17:47)  HR: 106 (88 - 118)  BP: 100/61 (98/56 - 110/61)  BP(mean): 76 (73 - 79)  RR: 16 (15 - 18)  SpO2: 97% (94% - 99%)  I&O's Detail    I & Os for current day (as of 08 Jun 2017 14:41)  =============================================  IN:    Oral Fluid: 380 ml    sodium chloride 0.45%.: 60 ml    Total IN: 440 ml  ---------------------------------------------  OUT:    Chest Tube: 150 ml    Total OUT: 150 ml  ---------------------------------------------  Total NET: 290 ml      EPICARDIAL WIRES REMOVED: Yes/No.  TIE DOWNS REMOVED: Yes/No.    PHYSICAL EXAM:    General:     Neurological:    Cardiovascular:    Respiratory:    Gastrointestinal:    Extremities:    Vascular:    Incision Sites:    LABS:                        9.3    3.9   )-----------( 54       ( 07 Jun 2017 13:55 )             29.2       COUMADIN:  Yes/No.        DOSE:                  INDICATION:                GOAL INR:    PT/INR - ( 07 Jun 2017 13:55 )   PT: 14.5 sec;   INR: 1.30          PTT - ( 07 Jun 2017 13:55 )  PTT:34.5 sec    06-07    139  |  100  |  37<H>  ----------------------------<  94  5.3   |  23  |  5.70<H>    Ca    8.3<L>      07 Jun 2017 13:55  Phos  3.0     06-07  Mg     1.8     06-07    TPro  5.5<L>  /  Alb  3.3  /  TBili  2.0<H>  /  DBili  x   /  AST  31  /  ALT  39  /  AlkPhos  97  06-07          MEDICATIONS  (STANDING):  tamsulosin 0.4milliGRAM(s) Oral at bedtime  mirtazapine 15milliGRAM(s) Oral at bedtime  atorvastatin 40milliGRAM(s) Oral at bedtime  calcium acetate 667milliGRAM(s) Oral three times a day with meals  sevelamer hydrochloride 800milliGRAM(s) Oral daily  multivitamin 1Tablet(s) Oral daily  predniSONE   Tablet 5milliGRAM(s) Oral daily  sodium chloride 0.45%. 1000milliLiter(s) IV Continuous <Continuous>  metoprolol 12.5milliGRAM(s) Oral two times a day  dextrose 5%. 1000milliLiter(s) IV Continuous <Continuous>  dextrose 50% Injectable 12.5Gram(s) IV Push once  dextrose 50% Injectable 25Gram(s) IV Push once  dextrose 50% Injectable 25Gram(s) IV Push once  lidocaine   Patch 1Patch Transdermal daily  pantoprazole    Tablet 40milliGRAM(s) Oral before breakfast  sodium chloride 0.9% lock flush 3milliLiter(s) IV Push every 8 hours  docusate sodium 100milliGRAM(s) Oral three times a day  epoetin thor Injectable 6000Unit(s) IV Push once      Discharge CXR:    Discharge ECHO: Patient discussed on morning rounds with Dr. Lisa    Operation / Date: PVL closure for severe AI on 6/6    Surgeon: Dr. Lisa    Referring Physician:     SUBJECTIVE ASSESSMENT:  Pt without complaints, feels well today. Chest tube removed at bedside, f/u CXR showing no pneumothorax. Pt denies pain, SOB, N/V. Still in afib, off AC due to thrombocytopenia.     Hospital Course:           Vital Signs Last 24 Hrs  T(C): 36.9, Max: 36.9 (06-07 @ 17:47)  T(F): 98.4, Max: 98.5 (06-07 @ 17:47)  HR: 106 (88 - 118)  BP: 100/61 (98/56 - 110/61)  BP(mean): 76 (73 - 79)  RR: 16 (15 - 18)  SpO2: 97% (94% - 99%)  I&O's Detail    I & Os for current day (as of 08 Jun 2017 14:41)  =============================================  IN:    Oral Fluid: 380 ml    sodium chloride 0.45%.: 60 ml    Total IN: 440 ml  ---------------------------------------------  OUT:    Chest Tube: 150 ml    Total OUT: 150 ml  ---------------------------------------------  Total NET: 290 ml      EPICARDIAL WIRES REMOVED: Yes/No.  TIE DOWNS REMOVED: Yes/No.    PHYSICAL EXAM:    General:     Neurological:    Cardiovascular:    Respiratory:    Gastrointestinal:    Extremities:    Vascular:    Incision Sites:    LABS:                        9.3    3.9   )-----------( 54       ( 07 Jun 2017 13:55 )             29.2       COUMADIN:  Yes/No.        DOSE:                  INDICATION:                GOAL INR:    PT/INR - ( 07 Jun 2017 13:55 )   PT: 14.5 sec;   INR: 1.30          PTT - ( 07 Jun 2017 13:55 )  PTT:34.5 sec    06-07    139  |  100  |  37<H>  ----------------------------<  94  5.3   |  23  |  5.70<H>    Ca    8.3<L>      07 Jun 2017 13:55  Phos  3.0     06-07  Mg     1.8     06-07    TPro  5.5<L>  /  Alb  3.3  /  TBili  2.0<H>  /  DBili  x   /  AST  31  /  ALT  39  /  AlkPhos  97  06-07          MEDICATIONS  (STANDING):  tamsulosin 0.4milliGRAM(s) Oral at bedtime  mirtazapine 15milliGRAM(s) Oral at bedtime  atorvastatin 40milliGRAM(s) Oral at bedtime  calcium acetate 667milliGRAM(s) Oral three times a day with meals  sevelamer hydrochloride 800milliGRAM(s) Oral daily  multivitamin 1Tablet(s) Oral daily  predniSONE   Tablet 5milliGRAM(s) Oral daily  sodium chloride 0.45%. 1000milliLiter(s) IV Continuous <Continuous>  metoprolol 12.5milliGRAM(s) Oral two times a day  dextrose 5%. 1000milliLiter(s) IV Continuous <Continuous>  dextrose 50% Injectable 12.5Gram(s) IV Push once  dextrose 50% Injectable 25Gram(s) IV Push once  dextrose 50% Injectable 25Gram(s) IV Push once  lidocaine   Patch 1Patch Transdermal daily  pantoprazole    Tablet 40milliGRAM(s) Oral before breakfast  sodium chloride 0.9% lock flush 3milliLiter(s) IV Push every 8 hours  docusate sodium 100milliGRAM(s) Oral three times a day  epoetin thor Injectable 6000Unit(s) IV Push once      Discharge CXR:    Discharge ECHO: Patient discussed on morning rounds with Dr. Lisa    Operation / Date: PVL closure for severe AI on 6/6    Surgeon: Dr. Lisa    Referring Physician:     SUBJECTIVE ASSESSMENT:  Pt without complaints, feels well today. Chest tube removed at bedside, f/u CXR showing no pneumothorax. Pt denies pain, SOB, N/V. Still in afib, off AC due to thrombocytopenia.     Hospital Course: 62M with PMH ESRD on HD, IGA nephropathy, CHF, JADA, gout, HTN, CAD s/p CABG 2017, AVR(b), IVC filter, Afib on coumadin at home, who was admitted to OSH with heart failure, then transferred to [facility] for surgical intervention of newly found AI and MR. Pt had PVL closure on 6/6, with plans for possible mitral intervention in the future. Pt did well post op, extubated on time. Pt with thrombocytopenia post op, Dr. Regan of hematology consulted, platelets uptrending on discharge. HD continued as schedule, but did not require HD on 6/8, he will resume his outpt schedule on 6/9. Pt continued in afib, will go home with beta blocker. AC is being held 2/2 thrombocytopenia, with plans to follow up next week for possible resumption.  EKG showing widened QRS post op, which has resolved day of discharge. Pt is now stable and ready for discharge home.     Vital Signs Last 24 Hrs  T(C): 36.9, Max: 36.9 (06-07 @ 17:47)  T(F): 98.4, Max: 98.5 (06-07 @ 17:47)  HR: 106 (88 - 118)  BP: 100/61 (98/56 - 110/61)  BP(mean): 76 (73 - 79)  RR: 16 (15 - 18)  SpO2: 97% (94% - 99%)  I&O's Detail    I & Os for current day (as of 08 Jun 2017 14:41)  =============================================  IN:    Oral Fluid: 380 ml    sodium chloride 0.45%.: 60 ml    Total IN: 440 ml  ---------------------------------------------  OUT:    Chest Tube: 150 ml    Total OUT: 150 ml  ---------------------------------------------  Total NET: 290 ml      EPICARDIAL WIRES REMOVED: No.  TIE DOWNS REMOVED: No.    PHYSICAL EXAM:    General: NAD, sitting in bed comfortably    Neurological: AAOx3    Cardiovascular: Tachy to 100, irregular    Respiratory: CTA b/l, unlabored on room air    Gastrointestinal: soft, NTND    Extremities: WWP, no edema b/l    Incision Sites: Incision sites CDI, no drainage or erythema     LABS:                        9.3    3.9   )-----------( 54       ( 07 Jun 2017 13:55 )             29.2       COUMADIN:  Yes/No.        DOSE:                  INDICATION:                GOAL INR:    PT/INR - ( 07 Jun 2017 13:55 )   PT: 14.5 sec;   INR: 1.30          PTT - ( 07 Jun 2017 13:55 )  PTT:34.5 sec    06-07    139  |  100  |  37<H>  ----------------------------<  94  5.3   |  23  |  5.70<H>    Ca    8.3<L>      07 Jun 2017 13:55  Phos  3.0     06-07  Mg     1.8     06-07    TPro  5.5<L>  /  Alb  3.3  /  TBili  2.0<H>  /  DBili  x   /  AST  31  /  ALT  39  /  AlkPhos  97  06-07          MEDICATIONS  (STANDING):  tamsulosin 0.4milliGRAM(s) Oral at bedtime  mirtazapine 15milliGRAM(s) Oral at bedtime  atorvastatin 40milliGRAM(s) Oral at bedtime  calcium acetate 667milliGRAM(s) Oral three times a day with meals  sevelamer hydrochloride 800milliGRAM(s) Oral daily  multivitamin 1Tablet(s) Oral daily  predniSONE   Tablet 5milliGRAM(s) Oral daily  sodium chloride 0.45%. 1000milliLiter(s) IV Continuous <Continuous>  metoprolol 12.5milliGRAM(s) Oral two times a day  dextrose 5%. 1000milliLiter(s) IV Continuous <Continuous>  dextrose 50% Injectable 12.5Gram(s) IV Push once  dextrose 50% Injectable 25Gram(s) IV Push once  dextrose 50% Injectable 25Gram(s) IV Push once  lidocaine   Patch 1Patch Transdermal daily  pantoprazole    Tablet 40milliGRAM(s) Oral before breakfast  sodium chloride 0.9% lock flush 3milliLiter(s) IV Push every 8 hours  docusate sodium 100milliGRAM(s) Oral three times a day  epoetin thor Injectable 6000Unit(s) IV Push once      Discharge CXR: IMPRESSION:  Persistent minute left apical pneumothorax.  Interval removal of a percutaneous left-sided chest tube.  No other significant interval findings.    Discharge ECHO: Interpretation Summary  There is mild concentric left ventricular hypertrophy.The left ventricular wall motion is normal.The left ventricular ejection fraction is estimated to   be 55-60%The left atrium is severely dilated.The left atrial volume index is 45 cc/m (normal <34cc/m2)Right atrial size is normal.The right ventricle is   normal in size and function.There is a bioprosthetic aortic valve.There is mild to moderate aortic regurgitation.The aortic regurgitation is   paravalvular.The mean pressure gradient is 15 mmHg.There is moderate mitral annular calcification.There is mild mitral regurgitation.Structurally   normal tricuspid valve.There is mild tricuspid regurgitation.The pulmonary artery systolic pressure is estimated to be 36 mmHg.Structurally normal pulmonic   valve.No pulmonic regurgitation noted.No aortic root dilatation.The inferior vena cava is normal in size (<2.1 cm) with normal inspiratory collapse   (>50%) consistent with normal right atrial pressure.  Incidental finding of a liver cyst,    Procedure Details  A complete two-dimensional transthoracic echocardiogram was performed (2D,M-mode, spectral and color flow doppler).  Study Quality: Good.Left VentricleThere is mild concentric left ventricular hypertrophy.  The left ventricular wall motion is normal.The left ventricular ejection fraction is estimated to be 55-60%  Left AtriumThe left atrium is severely dilated.The left atrial volume index is 45cc/m2 (normal <34cc/m2)  Right AtriumRight atrial size is normal.  Right VentricleThe right ventricle is normal in size and function.  Aortic ValveThere is a bioprosthetic aortic valve.There is mild to moderate aortic regurgitation.The aortic regurgitation is paravalvular.  The mean pressure gradient is 15 mmHg.Mitral Valve There is moderate mitral annular calcification.  There is mild mitral regurgitation. Tricuspid ValveStructurally normal tricuspid valve.There is mild tricuspid regurgitation.  The pulmonary artery systolic pressure is estimated to be 36 mmHg.Pulmonic ValveStructurally normal pulmonic valve.No pulmonic regurgitation noted.  Arteries and Venous SystemNo aortic root dilatation.The inferior vena cava is normal in size (<2.1 cm) with normal inspiratory  collapse (>50%) consistent with normal right atrial pressure.Pericardium / PleuraThere is no pericardial effusion.       62M with PMH ESRD on HD, IGA nephropathy, CHF, JADA, gout, HTN, CAD s/p CABG 2017, AVR(b), IVC filter, Afib on coumadin at home, who was admitted to OSH with heart failure, then transferred to [facility] for surgical intervention of newly found AI and MR. Pt had PVL closure on 6/6 with Dr. Lisa. Post op with thrombocytopenia and SUNNY. He is now stable for discharge.     - discharge home today  - f/u CBC at appointment next week, 6/14 @ 2:45

## 2017-06-08 NOTE — PROGRESS NOTE ADULT - PROBLEM SELECTOR PROBLEM 4
ESRD (end stage renal disease)
Thrombocytopenia
Chronic kidney disease-mineral and bone disorder

## 2017-06-08 NOTE — PROGRESS NOTE ADULT - PROBLEM SELECTOR PLAN 1
Platelet count is stable. Continue to monitor.
electrolytes vital signs acceptable   no indication for hd   next hd on 6/5/17
s/p AVR   schedule for PVL closure at this admission
s/p AVR  PVL evaluation for its closure.  Echo pending
Patient is a 62 year old male with ESRD on HD M/W/F s/p aortic valve replacement and is s/p PVL closure. Patient was last dialyzed 6/6 with UF of 2 kg     P - Patient does not require dialysis today as volume status and electrolytes are stable  Patient is planned for discharge today.   According to primary team, the patient is set for dialysis at his outpatient unit tomorrow   Continue sevelamer 800mg qdaily
Patient is a 62 year old male with ESRD on HD M/W/F s/p aortic valve replacement and is s/p PVL closure. Patient was last dialyzed 6/6 with UF of 2 kg     P - Patient does not require dialysis today as volume status and electrolytes are stable  will reevaluate for dialysis tomorrow   Continue sevelamer 800mg qdaily
Patient is a 62 year old male with ESRD on HD M/W/F s/p aortic valve replacement and scheduled for PVL closure. Patient was last dialyzed 6/5    P - Patient does not require dialysis today as volume status and electrolytes are stable  Will plan for next dialysis on 6/7  Continue sevelamer 800mg qdaily
Patient is a 62 year old male with ESRD on HD M/W/F s/p aortic valve replacement and scheduled for PVL closure. Patient is due for maintenance dialysis today.     P - Hemodialysis today   optiflux 180, , , 2K bath   Goal EDW 2 kg over 3.5 hours.  Epo During dialysis  Continue sevelamer 800mg qdaily

## 2017-06-09 LAB
DRVVT SCREEN TO CONFIRM RATIO: SIGNIFICANT CHANGE UP
LA NT DPL PPP QL: 34.4 SEC — SIGNIFICANT CHANGE UP
THROMBIN TIME: 29.7 SEC — HIGH (ref 17.6–24)

## 2017-06-13 DIAGNOSIS — E43 UNSPECIFIED SEVERE PROTEIN-CALORIE MALNUTRITION: ICD-10-CM

## 2017-06-13 DIAGNOSIS — R74.0 NONSPECIFIC ELEVATION OF LEVELS OF TRANSAMINASE AND LACTIC ACID DEHYDROGENASE [LDH]: ICD-10-CM

## 2017-06-13 DIAGNOSIS — Z79.82 LONG TERM (CURRENT) USE OF ASPIRIN: ICD-10-CM

## 2017-06-13 DIAGNOSIS — Z95.1 PRESENCE OF AORTOCORONARY BYPASS GRAFT: ICD-10-CM

## 2017-06-13 DIAGNOSIS — M10.9 GOUT, UNSPECIFIED: ICD-10-CM

## 2017-06-13 DIAGNOSIS — D69.6 THROMBOCYTOPENIA, UNSPECIFIED: ICD-10-CM

## 2017-06-13 DIAGNOSIS — M19.90 UNSPECIFIED OSTEOARTHRITIS, UNSPECIFIED SITE: ICD-10-CM

## 2017-06-13 DIAGNOSIS — N18.6 END STAGE RENAL DISEASE: ICD-10-CM

## 2017-06-13 DIAGNOSIS — R00.1 BRADYCARDIA, UNSPECIFIED: ICD-10-CM

## 2017-06-13 DIAGNOSIS — Z95.828 PRESENCE OF OTHER VASCULAR IMPLANTS AND GRAFTS: ICD-10-CM

## 2017-06-13 DIAGNOSIS — N02.8 RECURRENT AND PERSISTENT HEMATURIA WITH OTHER MORPHOLOGIC CHANGES: ICD-10-CM

## 2017-06-13 DIAGNOSIS — N40.0 BENIGN PROSTATIC HYPERPLASIA WITHOUT LOWER URINARY TRACT SYMPTOMS: ICD-10-CM

## 2017-06-13 DIAGNOSIS — I13.2 HYPERTENSIVE HEART AND CHRONIC KIDNEY DISEASE WITH HEART FAILURE AND WITH STAGE 5 CHRONIC KIDNEY DISEASE, OR END STAGE RENAL DISEASE: ICD-10-CM

## 2017-06-13 DIAGNOSIS — F41.9 ANXIETY DISORDER, UNSPECIFIED: ICD-10-CM

## 2017-06-13 DIAGNOSIS — J90 PLEURAL EFFUSION, NOT ELSEWHERE CLASSIFIED: ICD-10-CM

## 2017-06-13 DIAGNOSIS — I44.7 LEFT BUNDLE-BRANCH BLOCK, UNSPECIFIED: ICD-10-CM

## 2017-06-13 DIAGNOSIS — I50.33 ACUTE ON CHRONIC DIASTOLIC (CONGESTIVE) HEART FAILURE: ICD-10-CM

## 2017-06-13 DIAGNOSIS — T82.03XA LEAKAGE OF HEART VALVE PROSTHESIS, INITIAL ENCOUNTER: ICD-10-CM

## 2017-06-13 DIAGNOSIS — I48.2 CHRONIC ATRIAL FIBRILLATION: ICD-10-CM

## 2017-06-13 DIAGNOSIS — I08.0 RHEUMATIC DISORDERS OF BOTH MITRAL AND AORTIC VALVES: ICD-10-CM

## 2017-06-13 DIAGNOSIS — D69.1 QUALITATIVE PLATELET DEFECTS: ICD-10-CM

## 2017-06-13 DIAGNOSIS — F32.9 MAJOR DEPRESSIVE DISORDER, SINGLE EPISODE, UNSPECIFIED: ICD-10-CM

## 2017-06-13 DIAGNOSIS — Z90.49 ACQUIRED ABSENCE OF OTHER SPECIFIED PARTS OF DIGESTIVE TRACT: ICD-10-CM

## 2017-06-13 DIAGNOSIS — Z99.2 DEPENDENCE ON RENAL DIALYSIS: ICD-10-CM

## 2017-06-13 DIAGNOSIS — Z95.2 PRESENCE OF PROSTHETIC HEART VALVE: ICD-10-CM

## 2017-06-13 DIAGNOSIS — I95.9 HYPOTENSION, UNSPECIFIED: ICD-10-CM

## 2017-06-13 DIAGNOSIS — Z90.5 ACQUIRED ABSENCE OF KIDNEY: ICD-10-CM

## 2017-06-13 DIAGNOSIS — D68.9 COAGULATION DEFECT, UNSPECIFIED: ICD-10-CM

## 2017-06-13 DIAGNOSIS — I25.10 ATHEROSCLEROTIC HEART DISEASE OF NATIVE CORONARY ARTERY WITHOUT ANGINA PECTORIS: ICD-10-CM

## 2017-06-13 DIAGNOSIS — G47.33 OBSTRUCTIVE SLEEP APNEA (ADULT) (PEDIATRIC): ICD-10-CM

## 2017-06-13 DIAGNOSIS — D64.9 ANEMIA, UNSPECIFIED: ICD-10-CM

## 2017-06-14 ENCOUNTER — APPOINTMENT (OUTPATIENT)
Dept: CARDIOTHORACIC SURGERY | Facility: CLINIC | Age: 62
End: 2017-06-14

## 2017-06-14 DIAGNOSIS — N18.6 END STAGE RENAL DISEASE: ICD-10-CM

## 2017-06-14 DIAGNOSIS — Z86.79 PERSONAL HISTORY OF OTHER DISEASES OF THE CIRCULATORY SYSTEM: ICD-10-CM

## 2017-06-14 DIAGNOSIS — I48.91 UNSPECIFIED ATRIAL FIBRILLATION: ICD-10-CM

## 2017-06-14 DIAGNOSIS — Z87.39 PERSONAL HISTORY OF OTHER DISEASES OF THE MUSCULOSKELETAL SYSTEM AND CONNECTIVE TISSUE: ICD-10-CM

## 2017-06-14 DIAGNOSIS — T82.03XA LEAKAGE OF HEART VALVE PROSTHESIS, INITIAL ENCOUNTER: ICD-10-CM

## 2017-06-14 DIAGNOSIS — D69.6 THROMBOCYTOPENIA, UNSPECIFIED: ICD-10-CM

## 2017-06-14 DIAGNOSIS — I50.32 CHRONIC DIASTOLIC (CONGESTIVE) HEART FAILURE: ICD-10-CM

## 2017-06-14 DIAGNOSIS — I34.0 NONRHEUMATIC MITRAL (VALVE) INSUFFICIENCY: ICD-10-CM

## 2017-06-14 DIAGNOSIS — G47.33 OBSTRUCTIVE SLEEP APNEA (ADULT) (PEDIATRIC): ICD-10-CM

## 2017-06-14 DIAGNOSIS — Z99.89 OBSTRUCTIVE SLEEP APNEA (ADULT) (PEDIATRIC): ICD-10-CM
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
## 2017-06-14 DIAGNOSIS — Z86.39 PERSONAL HISTORY OF OTHER ENDOCRINE, NUTRITIONAL AND METABOLIC DISEASE: ICD-10-CM

## 2017-06-20 ENCOUNTER — INPATIENT (INPATIENT)
Facility: HOSPITAL | Age: 62
LOS: 3 days | Discharge: HOME | End: 2017-06-24
Attending: INTERNAL MEDICINE | Admitting: INTERNAL MEDICINE

## 2017-06-20 DIAGNOSIS — Z90.49 ACQUIRED ABSENCE OF OTHER SPECIFIED PARTS OF DIGESTIVE TRACT: Chronic | ICD-10-CM

## 2017-06-20 DIAGNOSIS — R55 SYNCOPE AND COLLAPSE: ICD-10-CM

## 2017-06-20 DIAGNOSIS — Z98.890 OTHER SPECIFIED POSTPROCEDURAL STATES: Chronic | ICD-10-CM

## 2017-06-20 DIAGNOSIS — Z95.2 PRESENCE OF PROSTHETIC HEART VALVE: Chronic | ICD-10-CM

## 2017-06-20 DIAGNOSIS — Z90.5 ACQUIRED ABSENCE OF KIDNEY: Chronic | ICD-10-CM

## 2017-06-20 DIAGNOSIS — R07.89 OTHER CHEST PAIN: ICD-10-CM

## 2017-06-20 DIAGNOSIS — I34.0 NONRHEUMATIC MITRAL (VALVE) INSUFFICIENCY: ICD-10-CM

## 2017-06-20 DIAGNOSIS — I50.9 HEART FAILURE, UNSPECIFIED: ICD-10-CM

## 2017-06-20 DIAGNOSIS — I77.0 ARTERIOVENOUS FISTULA, ACQUIRED: Chronic | ICD-10-CM

## 2017-06-20 DIAGNOSIS — R07.9 CHEST PAIN, UNSPECIFIED: ICD-10-CM

## 2017-06-20 DIAGNOSIS — D64.9 ANEMIA, UNSPECIFIED: ICD-10-CM

## 2017-06-20 DIAGNOSIS — N18.6 END STAGE RENAL DISEASE: ICD-10-CM

## 2017-06-20 DIAGNOSIS — Z95.1 PRESENCE OF AORTOCORONARY BYPASS GRAFT: Chronic | ICD-10-CM

## 2017-06-20 DIAGNOSIS — R57.0 CARDIOGENIC SHOCK: ICD-10-CM

## 2017-06-27 VITALS
DIASTOLIC BLOOD PRESSURE: 50 MMHG | BODY MASS INDEX: 19.75 KG/M2 | WEIGHT: 141.1 LBS | OXYGEN SATURATION: 96 % | SYSTOLIC BLOOD PRESSURE: 88 MMHG | HEART RATE: 61 BPM | HEIGHT: 70.87 IN

## 2017-06-27 PROBLEM — I48.91 ATRIAL FIBRILLATION: Status: ACTIVE | Noted: 2017-06-27

## 2017-06-27 PROBLEM — Z86.79 HISTORY OF CORONARY ARTERY DISEASE: Status: RESOLVED | Noted: 2017-06-27 | Resolved: 2017-06-27

## 2017-06-27 PROBLEM — N18.6 ESRD (END STAGE RENAL DISEASE): Status: ACTIVE | Noted: 2017-06-27

## 2017-06-27 PROBLEM — Z86.79 HISTORY OF HYPERTENSION: Status: RESOLVED | Noted: 2017-06-27 | Resolved: 2017-06-27

## 2017-06-27 PROBLEM — Z87.39 HISTORY OF GOUT: Status: RESOLVED | Noted: 2017-06-27 | Resolved: 2017-06-27

## 2017-06-27 PROBLEM — I34.0 MITRAL REGURGITATION: Status: ACTIVE | Noted: 2017-06-27

## 2017-06-27 PROBLEM — D69.6 THROMBOCYTOPENIA: Status: ACTIVE | Noted: 2017-06-27

## 2017-06-27 PROBLEM — T82.03XA PARAVALVULAR LEAK OF PROSTHETIC HEART VALVE: Status: ACTIVE | Noted: 2017-06-27

## 2017-06-27 PROBLEM — I50.32 CHRONIC DIASTOLIC HEART FAILURE: Status: ACTIVE | Noted: 2017-06-27

## 2017-06-27 PROBLEM — G47.33 OSA ON CPAP: Status: RESOLVED | Noted: 2017-06-27 | Resolved: 2017-06-27

## 2017-06-27 PROBLEM — Z86.39 HISTORY OF HYPERLIPIDEMIA: Status: RESOLVED | Noted: 2017-06-27 | Resolved: 2017-06-27

## 2017-06-27 RX ORDER — CALCIUM ACETATE 667 MG/1
667 TABLET ORAL DAILY
Refills: 0 | Status: ACTIVE | COMMUNITY

## 2017-06-27 RX ORDER — TAMSULOSIN HYDROCHLORIDE 0.4 MG/1
0.4 CAPSULE ORAL
Qty: 30 | Refills: 2 | Status: ACTIVE | COMMUNITY

## 2017-06-27 RX ORDER — AMIODARONE HYDROCHLORIDE 200 MG/1
200 TABLET ORAL DAILY
Qty: 30 | Refills: 0 | Status: ACTIVE | COMMUNITY

## 2017-06-27 RX ORDER — MIDODRINE HYDROCHLORIDE 2.5 MG/1
2.5 TABLET ORAL TWICE DAILY
Refills: 0 | Status: ACTIVE | COMMUNITY

## 2017-06-27 RX ORDER — ATORVASTATIN CALCIUM 40 MG/1
40 TABLET, FILM COATED ORAL DAILY
Refills: 0 | Status: ACTIVE | COMMUNITY

## 2017-06-27 RX ORDER — ALPRAZOLAM 0.25 MG/1
0.25 TABLET ORAL
Refills: 0 | Status: ACTIVE | COMMUNITY

## 2017-06-27 RX ORDER — SEVELAMER CARBONATE 800 MG/1
800 TABLET, FILM COATED ORAL DAILY
Refills: 0 | Status: ACTIVE | COMMUNITY

## 2017-06-27 RX ORDER — PREDNISONE 10 MG/1
10 TABLET ORAL DAILY
Refills: 0 | Status: ACTIVE | COMMUNITY

## 2017-06-28 DIAGNOSIS — Z79.01 LONG TERM (CURRENT) USE OF ANTICOAGULANTS: ICD-10-CM

## 2017-06-28 DIAGNOSIS — R55 SYNCOPE AND COLLAPSE: ICD-10-CM

## 2017-06-28 DIAGNOSIS — I25.2 OLD MYOCARDIAL INFARCTION: ICD-10-CM

## 2017-06-28 DIAGNOSIS — E83.39 OTHER DISORDERS OF PHOSPHORUS METABOLISM: ICD-10-CM

## 2017-06-28 DIAGNOSIS — E86.1 HYPOVOLEMIA: ICD-10-CM

## 2017-06-28 DIAGNOSIS — M10.9 GOUT, UNSPECIFIED: ICD-10-CM

## 2017-06-28 DIAGNOSIS — I73.1 THROMBOANGIITIS OBLITERANS [BUERGER'S DISEASE]: ICD-10-CM

## 2017-06-28 DIAGNOSIS — R13.12 DYSPHAGIA, OROPHARYNGEAL PHASE: ICD-10-CM

## 2017-06-28 DIAGNOSIS — N18.6 END STAGE RENAL DISEASE: ICD-10-CM

## 2017-06-28 DIAGNOSIS — I25.10 ATHEROSCLEROTIC HEART DISEASE OF NATIVE CORONARY ARTERY WITHOUT ANGINA PECTORIS: ICD-10-CM

## 2017-06-28 DIAGNOSIS — R11.10 VOMITING, UNSPECIFIED: ICD-10-CM

## 2017-06-28 DIAGNOSIS — Z79.52 LONG TERM (CURRENT) USE OF SYSTEMIC STEROIDS: ICD-10-CM

## 2017-06-28 DIAGNOSIS — Z95.1 PRESENCE OF AORTOCORONARY BYPASS GRAFT: ICD-10-CM

## 2017-06-28 DIAGNOSIS — Z90.5 ACQUIRED ABSENCE OF KIDNEY: ICD-10-CM

## 2017-06-28 DIAGNOSIS — F41.9 ANXIETY DISORDER, UNSPECIFIED: ICD-10-CM

## 2017-06-28 DIAGNOSIS — Z98.61 CORONARY ANGIOPLASTY STATUS: ICD-10-CM

## 2017-06-28 DIAGNOSIS — K31.819 ANGIODYSPLASIA OF STOMACH AND DUODENUM WITHOUT BLEEDING: ICD-10-CM

## 2017-06-28 DIAGNOSIS — Z95.2 PRESENCE OF PROSTHETIC HEART VALVE: ICD-10-CM

## 2017-06-28 DIAGNOSIS — E86.0 DEHYDRATION: ICD-10-CM

## 2017-06-28 DIAGNOSIS — R19.7 DIARRHEA, UNSPECIFIED: ICD-10-CM

## 2017-06-28 DIAGNOSIS — I48.91 UNSPECIFIED ATRIAL FIBRILLATION: ICD-10-CM

## 2017-06-28 DIAGNOSIS — Z99.2 DEPENDENCE ON RENAL DIALYSIS: ICD-10-CM

## 2017-06-28 DIAGNOSIS — D61.818 OTHER PANCYTOPENIA: ICD-10-CM

## 2017-06-28 DIAGNOSIS — I95.1 ORTHOSTATIC HYPOTENSION: ICD-10-CM

## 2017-06-28 DIAGNOSIS — I50.32 CHRONIC DIASTOLIC (CONGESTIVE) HEART FAILURE: ICD-10-CM

## 2017-06-28 DIAGNOSIS — E03.9 HYPOTHYROIDISM, UNSPECIFIED: ICD-10-CM

## 2017-06-28 DIAGNOSIS — F32.9 MAJOR DEPRESSIVE DISORDER, SINGLE EPISODE, UNSPECIFIED: ICD-10-CM

## 2017-08-09 ENCOUNTER — APPOINTMENT (OUTPATIENT)
Dept: CARDIOTHORACIC SURGERY | Facility: CLINIC | Age: 62
End: 2017-08-09
Payer: MEDICARE

## 2017-08-09 ENCOUNTER — OUTPATIENT (OUTPATIENT)
Dept: OUTPATIENT SERVICES | Facility: HOSPITAL | Age: 62
LOS: 1 days | Discharge: HOME | End: 2017-08-09

## 2017-08-09 DIAGNOSIS — Z95.2 PRESENCE OF PROSTHETIC HEART VALVE: Chronic | ICD-10-CM

## 2017-08-09 DIAGNOSIS — Z90.5 ACQUIRED ABSENCE OF KIDNEY: Chronic | ICD-10-CM

## 2017-08-09 DIAGNOSIS — Z98.890 OTHER SPECIFIED POSTPROCEDURAL STATES: Chronic | ICD-10-CM

## 2017-08-09 DIAGNOSIS — R55 SYNCOPE AND COLLAPSE: ICD-10-CM

## 2017-08-09 DIAGNOSIS — I25.10 ATHEROSCLEROTIC HEART DISEASE OF NATIVE CORONARY ARTERY WITHOUT ANGINA PECTORIS: ICD-10-CM

## 2017-08-09 DIAGNOSIS — R07.9 CHEST PAIN, UNSPECIFIED: ICD-10-CM

## 2017-08-09 DIAGNOSIS — Z90.49 ACQUIRED ABSENCE OF OTHER SPECIFIED PARTS OF DIGESTIVE TRACT: Chronic | ICD-10-CM

## 2017-08-09 DIAGNOSIS — D64.9 ANEMIA, UNSPECIFIED: ICD-10-CM

## 2017-08-09 DIAGNOSIS — I34.0 NONRHEUMATIC MITRAL (VALVE) INSUFFICIENCY: ICD-10-CM

## 2017-08-09 DIAGNOSIS — I77.0 ARTERIOVENOUS FISTULA, ACQUIRED: Chronic | ICD-10-CM

## 2017-08-09 DIAGNOSIS — R57.0 CARDIOGENIC SHOCK: ICD-10-CM

## 2017-08-09 DIAGNOSIS — N18.6 END STAGE RENAL DISEASE: ICD-10-CM

## 2017-08-09 DIAGNOSIS — I50.9 HEART FAILURE, UNSPECIFIED: ICD-10-CM

## 2017-08-09 DIAGNOSIS — Z95.1 PRESENCE OF AORTOCORONARY BYPASS GRAFT: Chronic | ICD-10-CM

## 2017-08-09 DIAGNOSIS — R07.89 OTHER CHEST PAIN: ICD-10-CM

## 2017-08-09 PROCEDURE — 99214 OFFICE O/P EST MOD 30 MIN: CPT

## 2017-08-11 DIAGNOSIS — E27.40 UNSPECIFIED ADRENOCORTICAL INSUFFICIENCY: ICD-10-CM

## 2017-08-11 DIAGNOSIS — Z95.1 PRESENCE OF AORTOCORONARY BYPASS GRAFT: ICD-10-CM

## 2017-08-11 DIAGNOSIS — G47.33 OBSTRUCTIVE SLEEP APNEA (ADULT) (PEDIATRIC): ICD-10-CM

## 2017-08-11 DIAGNOSIS — E87.5 HYPERKALEMIA: ICD-10-CM

## 2017-08-11 DIAGNOSIS — Z99.2 DEPENDENCE ON RENAL DIALYSIS: ICD-10-CM

## 2017-08-11 DIAGNOSIS — I13.2 HYPERTENSIVE HEART AND CHRONIC KIDNEY DISEASE WITH HEART FAILURE AND WITH STAGE 5 CHRONIC KIDNEY DISEASE, OR END STAGE RENAL DISEASE: ICD-10-CM

## 2017-08-11 DIAGNOSIS — I25.2 OLD MYOCARDIAL INFARCTION: ICD-10-CM

## 2017-08-11 DIAGNOSIS — E83.42 HYPOMAGNESEMIA: ICD-10-CM

## 2017-08-11 DIAGNOSIS — I27.2 OTHER SECONDARY PULMONARY HYPERTENSION: ICD-10-CM

## 2017-08-11 DIAGNOSIS — I95.1 ORTHOSTATIC HYPOTENSION: ICD-10-CM

## 2017-08-11 DIAGNOSIS — I50.43 ACUTE ON CHRONIC COMBINED SYSTOLIC (CONGESTIVE) AND DIASTOLIC (CONGESTIVE) HEART FAILURE: ICD-10-CM

## 2017-08-11 DIAGNOSIS — I21.4 NON-ST ELEVATION (NSTEMI) MYOCARDIAL INFARCTION: ICD-10-CM

## 2017-08-11 DIAGNOSIS — N18.6 END STAGE RENAL DISEASE: ICD-10-CM

## 2017-08-11 DIAGNOSIS — I08.0 RHEUMATIC DISORDERS OF BOTH MITRAL AND AORTIC VALVES: ICD-10-CM

## 2017-08-11 DIAGNOSIS — I46.9 CARDIAC ARREST, CAUSE UNSPECIFIED: ICD-10-CM

## 2017-08-11 DIAGNOSIS — E53.8 DEFICIENCY OF OTHER SPECIFIED B GROUP VITAMINS: ICD-10-CM

## 2017-08-11 DIAGNOSIS — I95.9 HYPOTENSION, UNSPECIFIED: ICD-10-CM

## 2017-08-11 DIAGNOSIS — S92.911A UNSPECIFIED FRACTURE OF RIGHT TOE(S), INITIAL ENCOUNTER FOR CLOSED FRACTURE: ICD-10-CM

## 2017-08-11 DIAGNOSIS — D64.9 ANEMIA, UNSPECIFIED: ICD-10-CM

## 2017-08-11 DIAGNOSIS — E43 UNSPECIFIED SEVERE PROTEIN-CALORIE MALNUTRITION: ICD-10-CM

## 2017-08-11 DIAGNOSIS — L97.519 NON-PRESSURE CHRONIC ULCER OF OTHER PART OF RIGHT FOOT WITH UNSPECIFIED SEVERITY: ICD-10-CM

## 2017-08-11 DIAGNOSIS — I48.0 PAROXYSMAL ATRIAL FIBRILLATION: ICD-10-CM

## 2017-08-11 DIAGNOSIS — R57.0 CARDIOGENIC SHOCK: ICD-10-CM

## 2017-08-11 DIAGNOSIS — T82.03XA LEAKAGE OF HEART VALVE PROSTHESIS, INITIAL ENCOUNTER: ICD-10-CM

## 2017-08-11 DIAGNOSIS — E87.2 ACIDOSIS: ICD-10-CM

## 2017-08-11 DIAGNOSIS — Z51.5 ENCOUNTER FOR PALLIATIVE CARE: ICD-10-CM

## 2017-08-11 DIAGNOSIS — J18.9 PNEUMONIA, UNSPECIFIED ORGANISM: ICD-10-CM

## 2017-08-11 DIAGNOSIS — J96.01 ACUTE RESPIRATORY FAILURE WITH HYPOXIA: ICD-10-CM
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
## 2017-08-11 DIAGNOSIS — I25.110 ATHEROSCLEROTIC HEART DISEASE OF NATIVE CORONARY ARTERY WITH UNSTABLE ANGINA PECTORIS: ICD-10-CM

## 2017-08-23 VITALS — OXYGEN SATURATION: 98 % | HEART RATE: 71 BPM | SYSTOLIC BLOOD PRESSURE: 102 MMHG | DIASTOLIC BLOOD PRESSURE: 48 MMHG

## 2017-08-23 RX ORDER — ASPIRIN 81 MG
81 TABLET, DELAYED RELEASE (ENTERIC COATED) ORAL DAILY
Qty: 30 | Refills: 0 | Status: ACTIVE | COMMUNITY

## 2017-09-05 ENCOUNTER — INPATIENT (INPATIENT)
Facility: HOSPITAL | Age: 62
LOS: 1 days | Discharge: HOME | End: 2017-09-07
Attending: INTERNAL MEDICINE | Admitting: INTERNAL MEDICINE

## 2017-09-05 DIAGNOSIS — D64.9 ANEMIA, UNSPECIFIED: ICD-10-CM

## 2017-09-05 DIAGNOSIS — N18.6 END STAGE RENAL DISEASE: ICD-10-CM

## 2017-09-05 DIAGNOSIS — R07.9 CHEST PAIN, UNSPECIFIED: ICD-10-CM

## 2017-09-05 DIAGNOSIS — Z90.5 ACQUIRED ABSENCE OF KIDNEY: Chronic | ICD-10-CM

## 2017-09-05 DIAGNOSIS — R55 SYNCOPE AND COLLAPSE: ICD-10-CM

## 2017-09-05 DIAGNOSIS — I34.0 NONRHEUMATIC MITRAL (VALVE) INSUFFICIENCY: ICD-10-CM

## 2017-09-05 DIAGNOSIS — Z95.2 PRESENCE OF PROSTHETIC HEART VALVE: Chronic | ICD-10-CM

## 2017-09-05 DIAGNOSIS — I77.0 ARTERIOVENOUS FISTULA, ACQUIRED: Chronic | ICD-10-CM

## 2017-09-05 DIAGNOSIS — R57.0 CARDIOGENIC SHOCK: ICD-10-CM

## 2017-09-05 DIAGNOSIS — Z90.49 ACQUIRED ABSENCE OF OTHER SPECIFIED PARTS OF DIGESTIVE TRACT: Chronic | ICD-10-CM

## 2017-09-05 DIAGNOSIS — R07.89 OTHER CHEST PAIN: ICD-10-CM

## 2017-09-05 DIAGNOSIS — Z98.890 OTHER SPECIFIED POSTPROCEDURAL STATES: Chronic | ICD-10-CM

## 2017-09-05 DIAGNOSIS — Z95.1 PRESENCE OF AORTOCORONARY BYPASS GRAFT: Chronic | ICD-10-CM

## 2017-09-05 DIAGNOSIS — I50.9 HEART FAILURE, UNSPECIFIED: ICD-10-CM

## 2017-09-18 DIAGNOSIS — Z95.1 PRESENCE OF AORTOCORONARY BYPASS GRAFT: ICD-10-CM

## 2017-09-18 DIAGNOSIS — Z86.74 PERSONAL HISTORY OF SUDDEN CARDIAC ARREST: ICD-10-CM

## 2017-09-18 DIAGNOSIS — I12.0 HYPERTENSIVE CHRONIC KIDNEY DISEASE WITH STAGE 5 CHRONIC KIDNEY DISEASE OR END STAGE RENAL DISEASE: ICD-10-CM

## 2017-09-18 DIAGNOSIS — Z95.828 PRESENCE OF OTHER VASCULAR IMPLANTS AND GRAFTS: ICD-10-CM

## 2017-09-18 DIAGNOSIS — E87.70 FLUID OVERLOAD, UNSPECIFIED: ICD-10-CM

## 2017-09-18 DIAGNOSIS — Z99.2 DEPENDENCE ON RENAL DIALYSIS: ICD-10-CM

## 2017-09-18 DIAGNOSIS — R07.89 OTHER CHEST PAIN: ICD-10-CM

## 2017-09-18 DIAGNOSIS — N18.6 END STAGE RENAL DISEASE: ICD-10-CM

## 2017-09-18 DIAGNOSIS — R06.02 SHORTNESS OF BREATH: ICD-10-CM

## 2017-09-18 DIAGNOSIS — Z95.2 PRESENCE OF PROSTHETIC HEART VALVE: ICD-10-CM

## 2017-09-18 DIAGNOSIS — I25.2 OLD MYOCARDIAL INFARCTION: ICD-10-CM

## 2017-09-18 DIAGNOSIS — I25.10 ATHEROSCLEROTIC HEART DISEASE OF NATIVE CORONARY ARTERY WITHOUT ANGINA PECTORIS: ICD-10-CM

## 2017-09-18 DIAGNOSIS — I48.2 CHRONIC ATRIAL FIBRILLATION: ICD-10-CM

## 2017-09-18 DIAGNOSIS — I95.3 HYPOTENSION OF HEMODIALYSIS: ICD-10-CM

## 2017-09-18 DIAGNOSIS — Z90.5 ACQUIRED ABSENCE OF KIDNEY: ICD-10-CM

## 2017-09-18 DIAGNOSIS — E87.5 HYPERKALEMIA: ICD-10-CM

## 2017-09-18 DIAGNOSIS — D63.1 ANEMIA IN CHRONIC KIDNEY DISEASE: ICD-10-CM

## 2017-09-18 DIAGNOSIS — Z90.49 ACQUIRED ABSENCE OF OTHER SPECIFIED PARTS OF DIGESTIVE TRACT: ICD-10-CM

## 2017-09-18 DIAGNOSIS — M10.9 GOUT, UNSPECIFIED: ICD-10-CM

## 2017-10-17 ENCOUNTER — INPATIENT (INPATIENT)
Facility: HOSPITAL | Age: 62
LOS: 0 days | Discharge: HOME | End: 2017-10-18
Attending: INTERNAL MEDICINE | Admitting: INTERNAL MEDICINE

## 2017-10-17 DIAGNOSIS — Z98.890 OTHER SPECIFIED POSTPROCEDURAL STATES: Chronic | ICD-10-CM

## 2017-10-17 DIAGNOSIS — I34.0 NONRHEUMATIC MITRAL (VALVE) INSUFFICIENCY: ICD-10-CM

## 2017-10-17 DIAGNOSIS — I50.9 HEART FAILURE, UNSPECIFIED: ICD-10-CM

## 2017-10-17 DIAGNOSIS — D64.9 ANEMIA, UNSPECIFIED: ICD-10-CM

## 2017-10-17 DIAGNOSIS — I77.0 ARTERIOVENOUS FISTULA, ACQUIRED: Chronic | ICD-10-CM

## 2017-10-17 DIAGNOSIS — R07.9 CHEST PAIN, UNSPECIFIED: ICD-10-CM

## 2017-10-17 DIAGNOSIS — Z95.2 PRESENCE OF PROSTHETIC HEART VALVE: Chronic | ICD-10-CM

## 2017-10-17 DIAGNOSIS — Z95.1 PRESENCE OF AORTOCORONARY BYPASS GRAFT: Chronic | ICD-10-CM

## 2017-10-17 DIAGNOSIS — R55 SYNCOPE AND COLLAPSE: ICD-10-CM

## 2017-10-17 DIAGNOSIS — N18.6 END STAGE RENAL DISEASE: ICD-10-CM

## 2017-10-17 DIAGNOSIS — Z90.5 ACQUIRED ABSENCE OF KIDNEY: Chronic | ICD-10-CM

## 2017-10-17 DIAGNOSIS — R57.0 CARDIOGENIC SHOCK: ICD-10-CM

## 2017-10-17 DIAGNOSIS — Z90.49 ACQUIRED ABSENCE OF OTHER SPECIFIED PARTS OF DIGESTIVE TRACT: Chronic | ICD-10-CM

## 2017-10-17 DIAGNOSIS — R07.89 OTHER CHEST PAIN: ICD-10-CM

## 2017-10-20 DIAGNOSIS — I48.91 UNSPECIFIED ATRIAL FIBRILLATION: ICD-10-CM

## 2017-10-20 DIAGNOSIS — Z90.5 ACQUIRED ABSENCE OF KIDNEY: ICD-10-CM

## 2017-10-20 DIAGNOSIS — Z95.2 PRESENCE OF PROSTHETIC HEART VALVE: ICD-10-CM

## 2017-10-20 DIAGNOSIS — N18.6 END STAGE RENAL DISEASE: ICD-10-CM

## 2017-10-20 DIAGNOSIS — E83.39 OTHER DISORDERS OF PHOSPHORUS METABOLISM: ICD-10-CM

## 2017-10-20 DIAGNOSIS — R00.0 TACHYCARDIA, UNSPECIFIED: ICD-10-CM

## 2017-10-20 DIAGNOSIS — E16.2 HYPOGLYCEMIA, UNSPECIFIED: ICD-10-CM

## 2017-10-20 DIAGNOSIS — J90 PLEURAL EFFUSION, NOT ELSEWHERE CLASSIFIED: ICD-10-CM

## 2017-10-20 DIAGNOSIS — Z99.2 DEPENDENCE ON RENAL DIALYSIS: ICD-10-CM

## 2017-10-20 DIAGNOSIS — K86.2 CYST OF PANCREAS: ICD-10-CM

## 2017-10-20 DIAGNOSIS — E87.5 HYPERKALEMIA: ICD-10-CM

## 2017-10-20 DIAGNOSIS — I12.0 HYPERTENSIVE CHRONIC KIDNEY DISEASE WITH STAGE 5 CHRONIC KIDNEY DISEASE OR END STAGE RENAL DISEASE: ICD-10-CM

## 2017-10-20 DIAGNOSIS — R10.9 UNSPECIFIED ABDOMINAL PAIN: ICD-10-CM

## 2017-10-20 DIAGNOSIS — Z90.49 ACQUIRED ABSENCE OF OTHER SPECIFIED PARTS OF DIGESTIVE TRACT: ICD-10-CM

## 2017-10-20 DIAGNOSIS — M10.9 GOUT, UNSPECIFIED: ICD-10-CM

## 2017-10-20 DIAGNOSIS — D37.8 NEOPLASM OF UNCERTAIN BEHAVIOR OF OTHER SPECIFIED DIGESTIVE ORGANS: ICD-10-CM

## 2017-10-20 DIAGNOSIS — I95.9 HYPOTENSION, UNSPECIFIED: ICD-10-CM

## 2017-10-20 DIAGNOSIS — I25.10 ATHEROSCLEROTIC HEART DISEASE OF NATIVE CORONARY ARTERY WITHOUT ANGINA PECTORIS: ICD-10-CM

## 2017-10-20 DIAGNOSIS — I25.2 OLD MYOCARDIAL INFARCTION: ICD-10-CM

## 2017-10-20 DIAGNOSIS — Z95.1 PRESENCE OF AORTOCORONARY BYPASS GRAFT: ICD-10-CM

## 2017-10-20 DIAGNOSIS — I95.89 OTHER HYPOTENSION: ICD-10-CM

## 2017-10-20 DIAGNOSIS — Z86.74 PERSONAL HISTORY OF SUDDEN CARDIAC ARREST: ICD-10-CM

## 2017-10-20 DIAGNOSIS — D63.1 ANEMIA IN CHRONIC KIDNEY DISEASE: ICD-10-CM

## 2017-10-29 ENCOUNTER — INPATIENT (INPATIENT)
Facility: HOSPITAL | Age: 62
LOS: 1 days | Discharge: HOME | End: 2017-10-31
Attending: INTERNAL MEDICINE | Admitting: INTERNAL MEDICINE

## 2017-10-29 DIAGNOSIS — I34.0 NONRHEUMATIC MITRAL (VALVE) INSUFFICIENCY: ICD-10-CM

## 2017-10-29 DIAGNOSIS — Z95.1 PRESENCE OF AORTOCORONARY BYPASS GRAFT: Chronic | ICD-10-CM

## 2017-10-29 DIAGNOSIS — Z95.2 PRESENCE OF PROSTHETIC HEART VALVE: Chronic | ICD-10-CM

## 2017-10-29 DIAGNOSIS — R57.0 CARDIOGENIC SHOCK: ICD-10-CM

## 2017-10-29 DIAGNOSIS — I50.9 HEART FAILURE, UNSPECIFIED: ICD-10-CM

## 2017-10-29 DIAGNOSIS — N18.6 END STAGE RENAL DISEASE: ICD-10-CM

## 2017-10-29 DIAGNOSIS — R07.9 CHEST PAIN, UNSPECIFIED: ICD-10-CM

## 2017-10-29 DIAGNOSIS — Z98.890 OTHER SPECIFIED POSTPROCEDURAL STATES: Chronic | ICD-10-CM

## 2017-10-29 DIAGNOSIS — D64.9 ANEMIA, UNSPECIFIED: ICD-10-CM

## 2017-10-29 DIAGNOSIS — R07.89 OTHER CHEST PAIN: ICD-10-CM

## 2017-10-29 DIAGNOSIS — R55 SYNCOPE AND COLLAPSE: ICD-10-CM

## 2017-10-29 DIAGNOSIS — Z90.5 ACQUIRED ABSENCE OF KIDNEY: Chronic | ICD-10-CM

## 2017-10-29 DIAGNOSIS — I77.0 ARTERIOVENOUS FISTULA, ACQUIRED: Chronic | ICD-10-CM

## 2017-10-29 DIAGNOSIS — Z90.49 ACQUIRED ABSENCE OF OTHER SPECIFIED PARTS OF DIGESTIVE TRACT: Chronic | ICD-10-CM

## 2017-11-02 DIAGNOSIS — E83.39 OTHER DISORDERS OF PHOSPHORUS METABOLISM: ICD-10-CM

## 2017-11-02 DIAGNOSIS — I50.20 UNSPECIFIED SYSTOLIC (CONGESTIVE) HEART FAILURE: ICD-10-CM

## 2017-11-02 DIAGNOSIS — Z95.3 PRESENCE OF XENOGENIC HEART VALVE: ICD-10-CM

## 2017-11-02 DIAGNOSIS — I25.10 ATHEROSCLEROTIC HEART DISEASE OF NATIVE CORONARY ARTERY WITHOUT ANGINA PECTORIS: ICD-10-CM

## 2017-11-02 DIAGNOSIS — I27.20 PULMONARY HYPERTENSION, UNSPECIFIED: ICD-10-CM

## 2017-11-02 DIAGNOSIS — I12.0 HYPERTENSIVE CHRONIC KIDNEY DISEASE WITH STAGE 5 CHRONIC KIDNEY DISEASE OR END STAGE RENAL DISEASE: ICD-10-CM

## 2017-11-02 DIAGNOSIS — Z95.1 PRESENCE OF AORTOCORONARY BYPASS GRAFT: ICD-10-CM

## 2017-11-02 DIAGNOSIS — M10.9 GOUT, UNSPECIFIED: ICD-10-CM

## 2017-11-02 DIAGNOSIS — Z79.82 LONG TERM (CURRENT) USE OF ASPIRIN: ICD-10-CM

## 2017-11-02 DIAGNOSIS — N18.6 END STAGE RENAL DISEASE: ICD-10-CM

## 2017-11-02 DIAGNOSIS — I34.0 NONRHEUMATIC MITRAL (VALVE) INSUFFICIENCY: ICD-10-CM

## 2017-11-02 DIAGNOSIS — Z91.81 HISTORY OF FALLING: ICD-10-CM

## 2017-11-02 DIAGNOSIS — I48.91 UNSPECIFIED ATRIAL FIBRILLATION: ICD-10-CM

## 2017-11-02 DIAGNOSIS — I95.1 ORTHOSTATIC HYPOTENSION: ICD-10-CM

## 2017-11-02 DIAGNOSIS — D64.9 ANEMIA, UNSPECIFIED: ICD-10-CM

## 2017-11-02 DIAGNOSIS — E87.5 HYPERKALEMIA: ICD-10-CM

## 2017-11-02 DIAGNOSIS — I25.2 OLD MYOCARDIAL INFARCTION: ICD-10-CM

## 2017-11-02 DIAGNOSIS — K86.2 CYST OF PANCREAS: ICD-10-CM

## 2017-11-02 DIAGNOSIS — Z90.5 ACQUIRED ABSENCE OF KIDNEY: ICD-10-CM

## 2017-11-02 DIAGNOSIS — E78.5 HYPERLIPIDEMIA, UNSPECIFIED: ICD-10-CM

## 2017-11-02 DIAGNOSIS — Z90.49 ACQUIRED ABSENCE OF OTHER SPECIFIED PARTS OF DIGESTIVE TRACT: ICD-10-CM

## 2017-11-02 DIAGNOSIS — Z99.2 DEPENDENCE ON RENAL DIALYSIS: ICD-10-CM

## 2017-11-02 DIAGNOSIS — R64 CACHEXIA: ICD-10-CM

## 2017-11-02 DIAGNOSIS — Z86.74 PERSONAL HISTORY OF SUDDEN CARDIAC ARREST: ICD-10-CM

## 2017-11-05 ENCOUNTER — INPATIENT (INPATIENT)
Facility: HOSPITAL | Age: 62
LOS: 0 days | Discharge: ANOTHER HOSPITAL | End: 2017-11-06
Attending: INTERNAL MEDICINE | Admitting: INTERNAL MEDICINE

## 2017-11-05 DIAGNOSIS — R07.89 OTHER CHEST PAIN: ICD-10-CM

## 2017-11-05 DIAGNOSIS — G47.33 OBSTRUCTIVE SLEEP APNEA (ADULT) (PEDIATRIC): ICD-10-CM

## 2017-11-05 DIAGNOSIS — I27.20 PULMONARY HYPERTENSION, UNSPECIFIED: ICD-10-CM

## 2017-11-05 DIAGNOSIS — N18.6 END STAGE RENAL DISEASE: ICD-10-CM

## 2017-11-05 DIAGNOSIS — R55 SYNCOPE AND COLLAPSE: ICD-10-CM

## 2017-11-05 DIAGNOSIS — Z91.15 PATIENT'S NONCOMPLIANCE WITH RENAL DIALYSIS: ICD-10-CM

## 2017-11-05 DIAGNOSIS — Z79.82 LONG TERM (CURRENT) USE OF ASPIRIN: ICD-10-CM

## 2017-11-05 DIAGNOSIS — I50.9 HEART FAILURE, UNSPECIFIED: ICD-10-CM

## 2017-11-05 DIAGNOSIS — Z98.890 OTHER SPECIFIED POSTPROCEDURAL STATES: Chronic | ICD-10-CM

## 2017-11-05 DIAGNOSIS — R57.0 CARDIOGENIC SHOCK: ICD-10-CM

## 2017-11-05 DIAGNOSIS — N02.8 RECURRENT AND PERSISTENT HEMATURIA WITH OTHER MORPHOLOGIC CHANGES: ICD-10-CM

## 2017-11-05 DIAGNOSIS — Z95.1 PRESENCE OF AORTOCORONARY BYPASS GRAFT: Chronic | ICD-10-CM

## 2017-11-05 DIAGNOSIS — I34.0 NONRHEUMATIC MITRAL (VALVE) INSUFFICIENCY: ICD-10-CM

## 2017-11-05 DIAGNOSIS — R07.9 CHEST PAIN, UNSPECIFIED: ICD-10-CM

## 2017-11-05 DIAGNOSIS — D64.9 ANEMIA, UNSPECIFIED: ICD-10-CM

## 2017-11-05 DIAGNOSIS — Z90.5 ACQUIRED ABSENCE OF KIDNEY: Chronic | ICD-10-CM

## 2017-11-05 DIAGNOSIS — R64 CACHEXIA: ICD-10-CM

## 2017-11-05 DIAGNOSIS — Z86.74 PERSONAL HISTORY OF SUDDEN CARDIAC ARREST: ICD-10-CM

## 2017-11-05 DIAGNOSIS — Z86.711 PERSONAL HISTORY OF PULMONARY EMBOLISM: ICD-10-CM

## 2017-11-05 DIAGNOSIS — Z95.2 PRESENCE OF PROSTHETIC HEART VALVE: Chronic | ICD-10-CM

## 2017-11-05 DIAGNOSIS — I77.0 ARTERIOVENOUS FISTULA, ACQUIRED: Chronic | ICD-10-CM

## 2017-11-05 DIAGNOSIS — Z90.49 ACQUIRED ABSENCE OF OTHER SPECIFIED PARTS OF DIGESTIVE TRACT: Chronic | ICD-10-CM

## 2017-11-05 DIAGNOSIS — Z90.5 ACQUIRED ABSENCE OF KIDNEY: ICD-10-CM

## 2017-11-06 ENCOUNTER — INPATIENT (INPATIENT)
Facility: HOSPITAL | Age: 62
LOS: 3 days | Discharge: HOME HEALTH | DRG: 306 | End: 2017-11-10
Attending: INTERNAL MEDICINE | Admitting: INTERNAL MEDICINE
Payer: MEDICARE

## 2017-11-06 VITALS
HEIGHT: 68 IN | WEIGHT: 132.94 LBS | OXYGEN SATURATION: 94 % | SYSTOLIC BLOOD PRESSURE: 91 MMHG | RESPIRATION RATE: 16 BRPM | DIASTOLIC BLOOD PRESSURE: 56 MMHG | HEART RATE: 104 BPM

## 2017-11-06 DIAGNOSIS — I77.0 ARTERIOVENOUS FISTULA, ACQUIRED: Chronic | ICD-10-CM

## 2017-11-06 DIAGNOSIS — Z95.2 PRESENCE OF PROSTHETIC HEART VALVE: Chronic | ICD-10-CM

## 2017-11-06 DIAGNOSIS — Z98.890 OTHER SPECIFIED POSTPROCEDURAL STATES: Chronic | ICD-10-CM

## 2017-11-06 DIAGNOSIS — Z90.49 ACQUIRED ABSENCE OF OTHER SPECIFIED PARTS OF DIGESTIVE TRACT: Chronic | ICD-10-CM

## 2017-11-06 DIAGNOSIS — Z90.5 ACQUIRED ABSENCE OF KIDNEY: Chronic | ICD-10-CM

## 2017-11-06 DIAGNOSIS — Z95.1 PRESENCE OF AORTOCORONARY BYPASS GRAFT: Chronic | ICD-10-CM

## 2017-11-06 LAB
ALBUMIN SERPL ELPH-MCNC: 3.5 G/DL — SIGNIFICANT CHANGE UP (ref 3.3–5)
ALP SERPL-CCNC: 62 U/L — SIGNIFICANT CHANGE UP (ref 40–120)
ALT FLD-CCNC: <5 U/L — LOW (ref 10–45)
ANION GAP SERPL CALC-SCNC: 16 MMOL/L — SIGNIFICANT CHANGE UP (ref 5–17)
APTT BLD: 35.4 SEC — SIGNIFICANT CHANGE UP (ref 27.5–37.4)
AST SERPL-CCNC: 9 U/L — LOW (ref 10–40)
BASOPHILS NFR BLD AUTO: 0.5 % — SIGNIFICANT CHANGE UP (ref 0–2)
BILIRUB SERPL-MCNC: 1.1 MG/DL — SIGNIFICANT CHANGE UP (ref 0.2–1.2)
BUN SERPL-MCNC: 33 MG/DL — HIGH (ref 7–23)
CALCIUM SERPL-MCNC: 7.9 MG/DL — LOW (ref 8.4–10.5)
CHLORIDE SERPL-SCNC: 97 MMOL/L — SIGNIFICANT CHANGE UP (ref 96–108)
CHOLEST SERPL-MCNC: 116 MG/DL — SIGNIFICANT CHANGE UP (ref 10–199)
CO2 SERPL-SCNC: 28 MMOL/L — SIGNIFICANT CHANGE UP (ref 22–31)
CREAT SERPL-MCNC: 5.87 MG/DL — HIGH (ref 0.5–1.3)
EOSINOPHIL NFR BLD AUTO: 2.9 % — SIGNIFICANT CHANGE UP (ref 0–6)
GLUCOSE SERPL-MCNC: 68 MG/DL — LOW (ref 70–99)
HCT VFR BLD CALC: 27.7 % — LOW (ref 39–50)
HDLC SERPL-MCNC: 34 MG/DL — LOW (ref 40–125)
HGB BLD-MCNC: 8.1 G/DL — LOW (ref 13–17)
INR BLD: 1.27 — HIGH (ref 0.88–1.16)
LIPID PNL WITH DIRECT LDL SERPL: 66 MG/DL — SIGNIFICANT CHANGE UP
LYMPHOCYTES # BLD AUTO: 26.5 % — SIGNIFICANT CHANGE UP (ref 13–44)
MAGNESIUM SERPL-MCNC: 1.9 MG/DL — SIGNIFICANT CHANGE UP (ref 1.6–2.6)
MCHC RBC-ENTMCNC: 29 PG — SIGNIFICANT CHANGE UP (ref 27–34)
MCHC RBC-ENTMCNC: 29.2 G/DL — LOW (ref 32–36)
MCV RBC AUTO: 99.3 FL — SIGNIFICANT CHANGE UP (ref 80–100)
MONOCYTES NFR BLD AUTO: 14.8 % — HIGH (ref 2–14)
NEUTROPHILS NFR BLD AUTO: 55.3 % — SIGNIFICANT CHANGE UP (ref 43–77)
NT-PROBNP SERPL-SCNC: HIGH PG/ML (ref 0–300)
PHOSPHATE SERPL-MCNC: 5.1 MG/DL — HIGH (ref 2.5–4.5)
PLATELET # BLD AUTO: 68 K/UL — LOW (ref 150–400)
POTASSIUM SERPL-MCNC: 5.2 MMOL/L — SIGNIFICANT CHANGE UP (ref 3.5–5.3)
POTASSIUM SERPL-SCNC: 5.2 MMOL/L — SIGNIFICANT CHANGE UP (ref 3.5–5.3)
PROT SERPL-MCNC: 6.2 G/DL — SIGNIFICANT CHANGE UP (ref 6–8.3)
PROTHROM AB SERPL-ACNC: 14.2 SEC — HIGH (ref 9.8–12.7)
RBC # BLD: 2.79 M/UL — LOW (ref 4.2–5.8)
RBC # FLD: 18.7 % — HIGH (ref 10.3–16.9)
SODIUM SERPL-SCNC: 141 MMOL/L — SIGNIFICANT CHANGE UP (ref 135–145)
TOTAL CHOLESTEROL/HDL RATIO MEASUREMENT: 3.4 RATIO — SIGNIFICANT CHANGE UP (ref 3.4–9.6)
TRIGL SERPL-MCNC: 81 MG/DL — SIGNIFICANT CHANGE UP (ref 10–149)
TSH SERPL-MCNC: 6.78 UIU/ML — HIGH (ref 0.35–4.94)
WBC # BLD: 3.8 K/UL — SIGNIFICANT CHANGE UP (ref 3.8–10.5)
WBC # FLD AUTO: 3.8 K/UL — SIGNIFICANT CHANGE UP (ref 3.8–10.5)

## 2017-11-06 PROCEDURE — 71010: CPT | Mod: 26

## 2017-11-06 RX ORDER — ASPIRIN/CALCIUM CARB/MAGNESIUM 324 MG
81 TABLET ORAL DAILY
Qty: 0 | Refills: 0 | Status: DISCONTINUED | OUTPATIENT
Start: 2017-11-06 | End: 2017-11-07

## 2017-11-06 RX ORDER — TAMSULOSIN HYDROCHLORIDE 0.4 MG/1
0.4 CAPSULE ORAL AT BEDTIME
Qty: 0 | Refills: 0 | Status: DISCONTINUED | OUTPATIENT
Start: 2017-11-06 | End: 2017-11-06

## 2017-11-06 RX ORDER — ATORVASTATIN CALCIUM 80 MG/1
40 TABLET, FILM COATED ORAL AT BEDTIME
Qty: 0 | Refills: 0 | Status: DISCONTINUED | OUTPATIENT
Start: 2017-11-06 | End: 2017-11-10

## 2017-11-06 RX ORDER — SODIUM CHLORIDE 9 MG/ML
3 INJECTION INTRAMUSCULAR; INTRAVENOUS; SUBCUTANEOUS EVERY 8 HOURS
Qty: 0 | Refills: 0 | Status: DISCONTINUED | OUTPATIENT
Start: 2017-11-06 | End: 2017-11-10

## 2017-11-06 RX ORDER — ONDANSETRON 8 MG/1
4 TABLET, FILM COATED ORAL ONCE
Qty: 0 | Refills: 0 | Status: COMPLETED | OUTPATIENT
Start: 2017-11-06 | End: 2017-11-09

## 2017-11-06 RX ORDER — PANTOPRAZOLE SODIUM 20 MG/1
40 TABLET, DELAYED RELEASE ORAL
Qty: 0 | Refills: 0 | Status: DISCONTINUED | OUTPATIENT
Start: 2017-11-06 | End: 2017-11-10

## 2017-11-06 RX ORDER — MORPHINE SULFATE 50 MG/1
2 CAPSULE, EXTENDED RELEASE ORAL ONCE
Qty: 0 | Refills: 0 | Status: DISCONTINUED | OUTPATIENT
Start: 2017-11-06 | End: 2017-11-06

## 2017-11-06 RX ORDER — AMIODARONE HYDROCHLORIDE 400 MG/1
200 TABLET ORAL DAILY
Qty: 0 | Refills: 0 | Status: DISCONTINUED | OUTPATIENT
Start: 2017-11-06 | End: 2017-11-10

## 2017-11-06 RX ORDER — METOCLOPRAMIDE HCL 10 MG
10 TABLET ORAL ONCE
Qty: 0 | Refills: 0 | Status: COMPLETED | OUTPATIENT
Start: 2017-11-06 | End: 2017-11-08

## 2017-11-06 RX ORDER — CINACALCET 30 MG/1
30 TABLET, FILM COATED ORAL DAILY
Qty: 0 | Refills: 0 | Status: DISCONTINUED | OUTPATIENT
Start: 2017-11-06 | End: 2017-11-08

## 2017-11-06 RX ORDER — MIDODRINE HYDROCHLORIDE 2.5 MG/1
10 TABLET ORAL THREE TIMES A DAY
Qty: 0 | Refills: 0 | Status: DISCONTINUED | OUTPATIENT
Start: 2017-11-06 | End: 2017-11-10

## 2017-11-06 RX ORDER — HEPARIN SODIUM 5000 [USP'U]/ML
5000 INJECTION INTRAVENOUS; SUBCUTANEOUS EVERY 8 HOURS
Qty: 0 | Refills: 0 | Status: DISCONTINUED | OUTPATIENT
Start: 2017-11-06 | End: 2017-11-06

## 2017-11-06 RX ADMIN — ATORVASTATIN CALCIUM 40 MILLIGRAM(S): 80 TABLET, FILM COATED ORAL at 23:58

## 2017-11-06 RX ADMIN — MORPHINE SULFATE 2 MILLIGRAM(S): 50 CAPSULE, EXTENDED RELEASE ORAL at 22:51

## 2017-11-06 RX ADMIN — MORPHINE SULFATE 2 MILLIGRAM(S): 50 CAPSULE, EXTENDED RELEASE ORAL at 23:00

## 2017-11-06 RX ADMIN — SODIUM CHLORIDE 3 MILLILITER(S): 9 INJECTION INTRAMUSCULAR; INTRAVENOUS; SUBCUTANEOUS at 22:16

## 2017-11-06 NOTE — H&P ADULT - HISTORY OF PRESENT ILLNESS
Patient is a 62 year old male with history of ESRD on HD last HD today via L AV fistula (M/W/F), CAD / AV disease s/p CABG and AVR in 02/2017 c/b refractory heart failure s/p aortic PVL at [facility] in June 2017 being admitted today (Transferred from OSH.) under the care of Dr. Lisa with decompensated heart failure (SOB. DELEON.). DARRIN showing severe mitral regurgitation. Patient is being considered for MitraClip and / or other intervention. Patient currently without other complaints.     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

## 2017-11-06 NOTE — H&P ADULT - NSHPREVIEWOFSYSTEMS_GEN_ALL_CORE
CONSTITUTIONAL: No fevers / chills, sweats, fatigue, weight loss, weight gain  NEUROLOGICAL: No parathesias, seizures, syncope, confusion  EYES: No blurry vision, discharge, pain, loss of vision  ENMT: No difficulty hearing, vertigo, dysphagia, epistaxis, recent dental work  CV: No chest pain, palpitations  RESPIRATORY:  No wheezing, cough / sputum, hemoptysis  GI: No nausea, vomiting, diarrhea, constipation, melena  : No hematuria, dysuria, urgency, incontinence  MUSCULOSKELETAL: No arthritis, joint swelling, muscle weakness  SKIN/BREAST: No rash, itching, hair loss, masses  PSYCHIATRY: No depression, anxiety, suicidal ideation  HEMATOLOGY:  No bruising easily, enlarged lymph nodes, tender lymph nodes  ENDOCRINE: No cold intolerance, heat intolerance, polydipsia

## 2017-11-06 NOTE — H&P ADULT - NSHPPHYSICALEXAM_GEN_ALL_CORE
Patient is a 62 year old male with history of ESRD on HD last HD today via L AV fistula (M/W/F), CAD / AV disease s/p CABG and AVR in 02/2017 c/b refractory heart failure s/p aortic PVL at [facility] in June 2017 being admitted today (Transferred from OSH.) under the care of Dr. Lisa with decompensated heart failure (SOB. DELEON.). DARRIN showing severe mitral regurgitation. Patient is being considered for MitraClip and / or other intervention. Patient currently without other complaints.     Neurovascular: No delirium. Pain well controlled with current regimen.  -PRN's.  Cardiovascular: Hemodynamically stable. HR controlled. Severe mitral regurgitation. Patient is being considered for MitraClip and / or other intervention.   -BP. HR. EKG. TTE. DARRIN. Cardiac Panel. Lipid Panel. BNP.   -Home Rx..   Respiratory: 02 Sat = 98% on RA.  -If on oxygen wean to RA from for O2 Sat > 93%.  -Encourage C+DB and Use of IS 10x / hr while awake.  -CXR.  GI: Stable. PMH/PSH.   -NPO after MN.  -PPX.  -PO Diet.  Renal / :  -Monitor renal function.  -Monitor I/O's.  Endocrine: History of Gout.  -Consider Prednisone PRN if Gout Flare.   -A1c.  -TSH.  Hematologic:  -CBC.  -Coagulation Panel.  -T/Sx2.  ID:  -Temperature.  -CBC.  -Observe for SIRS/Sepsis Syndrome.  Prophylaxis:  -DVT prophylaxis with 5000 SQ Heparin q8h.  -SCD's  Disposition:  -5L for frequent monitoring. Patient is a 62 year old male with history of ESRD on HD last HD today via L AV fistula (M/W/F), CAD / AV disease s/p CABG and AVR in 02/2017 c/b refractory heart failure s/p aortic PVL at [facility] in June 2017 being admitted today (Transferred from OSH.) under the care of Dr. Lisa with decompensated heart failure (SOB. DELEON.). DARRIN showing severe mitral regurgitation. Patient is being considered for MitraClip and / or other intervention. Patient currently without other complaints.     Neurovascular: No delirium. Pain well controlled with current regimen.  -PRN's.  Cardiovascular: Hemodynamically stable. HR controlled. Severe mitral regurgitation. Patient is being considered for MitraClip and / or other intervention. AF. Hypotenion. HLD.   -BP. HR. EKG. TTE. DARRIN. Cardiac Panel. Lipid Panel. BNP.   -Home Rx..   Respiratory: 02 Sat = 98% on RA.  -If on oxygen wean to RA from for O2 Sat > 93%.  -Encourage C+DB and Use of IS 10x / hr while awake.  -CXR.  GI: Stable. PMH/PSH.   -NPO after MN.  -PPX.  -PO Diet.  Renal / :  -Monitor renal function.  -Monitor I/O's.  Endocrine: History of Gout.  -Consider Prednisone PRN if Gout Flare.   -A1c.  -TSH.  Hematologic:  -CBC.  -Coagulation Panel.  -T/Sx2.  ID:  -Temperature.  -CBC.  -Observe for SIRS/Sepsis Syndrome.  Prophylaxis:  -DVT prophylaxis with 5000 SQ Heparin q8h.  -SCD's  Disposition:  -5L for frequent monitoring. Appearance: Normal	  HEENT:   Normal oral mucosa, PERRL, EOMI	  Neck: Supple, + JVD; - Carotid Bruit   Cardiovascular: Irregular. No JVD. + murmurs.  Respiratory: Decreased BS at B/L bases on auscultation.. No Rales, Rhonchi, Wheezing.	  Gastrointestinal:  Soft, non-tender, + BS.	  Skin: No rashes. No ecchymoses. No cyanosis.  Extremities: Normal range of motion, no clubbing, cyanosis or edema.  Vascular: Peripheral pulses palpable 2+ bilaterally.  Neurologic: Non-focal.  Psychiatry: A & O x 3, mood & affect appropriate.

## 2017-11-06 NOTE — H&P ADULT - ASSESSMENT
Patient is a 62 year old male with history of ESRD on HD last HD today via L AV fistula (M/W/F), CAD / AV disease s/p CABG and AVR in 02/2017 c/b refractory heart failure s/p aortic PVL at [facility] in June 2017 being admitted today (Transferred from OSH.) under the care of Dr. Lisa with decompensated heart failure (SOB. DELEON.). DARRIN showing severe mitral regurgitation. Patient is being considered for MitraClip and / or other intervention. Patient currently without other complaints.     Neurovascular: No delirium. Pain well controlled with current regimen.  -PRN's.    Cardiovascular: Hemodynamically stable. HR controlled. Severe mitral regurgitation. Patient is being considered for MitraClip and / or other intervention. AF. Hypotenion. HLD.   -BP. HR. EKG. TTE. DARRIN. Cardiac Panel. Lipid Panel. BNP.   -Home Rx..     Respiratory: 02 Sat = 98% on RA.  -If on oxygen wean to RA from for O2 Sat > 93%.  -Encourage C+DB and Use of IS 10x / hr while awake.  -CXR.    GI: Stable. PMH/PSH.   -NPO after MN.  -PPX.  -PO Diet.    Renal / :  -Monitor renal function.  -Monitor I/O's.    Endocrine: History of Gout.  -Consider Prednisone PRN if Gout Flare.   -A1c.  -TSH.    Hematologic:  -CBC.  -Coagulation Panel.  -T/Sx2.    ID:  -Temperature.  -CBC.  -Observe for SIRS/Sepsis Syndrome.    Prophylaxis:  -DVT prophylaxis with 5000 SQ Heparin q8h.  -SCD's    Disposition:  -5L for frequent monitoring.

## 2017-11-07 DIAGNOSIS — N25.0 RENAL OSTEODYSTROPHY: ICD-10-CM

## 2017-11-07 DIAGNOSIS — I10 ESSENTIAL (PRIMARY) HYPERTENSION: ICD-10-CM

## 2017-11-07 DIAGNOSIS — I38 ENDOCARDITIS, VALVE UNSPECIFIED: ICD-10-CM

## 2017-11-07 DIAGNOSIS — N18.9 CHRONIC KIDNEY DISEASE, UNSPECIFIED: ICD-10-CM

## 2017-11-07 DIAGNOSIS — N18.6 END STAGE RENAL DISEASE: ICD-10-CM

## 2017-11-07 LAB
ANION GAP SERPL CALC-SCNC: 16 MMOL/L — SIGNIFICANT CHANGE UP (ref 5–17)
APTT BLD: 34.6 SEC — SIGNIFICANT CHANGE UP (ref 27.5–37.4)
BLD GP AB SCN SERPL QL: NEGATIVE — SIGNIFICANT CHANGE UP
BUN SERPL-MCNC: 34 MG/DL — HIGH (ref 7–23)
CALCIUM SERPL-MCNC: 7.7 MG/DL — LOW (ref 8.4–10.5)
CHLORIDE SERPL-SCNC: 96 MMOL/L — SIGNIFICANT CHANGE UP (ref 96–108)
CO2 SERPL-SCNC: 29 MMOL/L — SIGNIFICANT CHANGE UP (ref 22–31)
CREAT SERPL-MCNC: 6.48 MG/DL — HIGH (ref 0.5–1.3)
GLUCOSE SERPL-MCNC: 69 MG/DL — LOW (ref 70–99)
HBA1C BLD-MCNC: <4.2 % — SIGNIFICANT CHANGE UP (ref 4–5.6)
HCT VFR BLD CALC: 27.4 % — LOW (ref 39–50)
HGB BLD-MCNC: 8.2 G/DL — LOW (ref 13–17)
INR BLD: 1.37 — HIGH (ref 0.88–1.16)
MAGNESIUM SERPL-MCNC: 2 MG/DL — SIGNIFICANT CHANGE UP (ref 1.6–2.6)
MCHC RBC-ENTMCNC: 29.5 PG — SIGNIFICANT CHANGE UP (ref 27–34)
MCHC RBC-ENTMCNC: 29.9 G/DL — LOW (ref 32–36)
MCV RBC AUTO: 98.6 FL — SIGNIFICANT CHANGE UP (ref 80–100)
PLATELET # BLD AUTO: 69 K/UL — LOW (ref 150–400)
POTASSIUM SERPL-MCNC: 5 MMOL/L — SIGNIFICANT CHANGE UP (ref 3.5–5.3)
POTASSIUM SERPL-SCNC: 5 MMOL/L — SIGNIFICANT CHANGE UP (ref 3.5–5.3)
PROTHROM AB SERPL-ACNC: 15.3 SEC — HIGH (ref 9.8–12.7)
RBC # BLD: 2.78 M/UL — LOW (ref 4.2–5.8)
RBC # FLD: 19.3 % — HIGH (ref 10.3–16.9)
RH IG SCN BLD-IMP: POSITIVE — SIGNIFICANT CHANGE UP
SODIUM SERPL-SCNC: 141 MMOL/L — SIGNIFICANT CHANGE UP (ref 135–145)
T3 SERPL-MCNC: 61 NG/DL — LOW (ref 80–200)
T3FREE SERPL-MCNC: 1.96 PG/ML — SIGNIFICANT CHANGE UP (ref 1.71–3.71)
T4 AB SER-ACNC: 6.98 UG/DL — SIGNIFICANT CHANGE UP (ref 3.17–11.72)
T4 FREE SERPL-MCNC: 1.05 NG/DL — SIGNIFICANT CHANGE UP (ref 0.7–1.48)
WBC # BLD: 3.5 K/UL — LOW (ref 3.8–10.5)
WBC # FLD AUTO: 3.5 K/UL — LOW (ref 3.8–10.5)

## 2017-11-07 PROCEDURE — 99222 1ST HOSP IP/OBS MODERATE 55: CPT

## 2017-11-07 PROCEDURE — 71010: CPT | Mod: 26,77

## 2017-11-07 PROCEDURE — 71010: CPT | Mod: 26

## 2017-11-07 PROCEDURE — 99254 IP/OBS CNSLTJ NEW/EST MOD 60: CPT

## 2017-11-07 PROCEDURE — 93306 TTE W/DOPPLER COMPLETE: CPT | Mod: 26

## 2017-11-07 PROCEDURE — 94010 BREATHING CAPACITY TEST: CPT | Mod: 26

## 2017-11-07 PROCEDURE — 93010 ELECTROCARDIOGRAM REPORT: CPT

## 2017-11-07 RX ORDER — MORPHINE SULFATE 50 MG/1
1 CAPSULE, EXTENDED RELEASE ORAL ONCE
Qty: 0 | Refills: 0 | Status: DISCONTINUED | OUTPATIENT
Start: 2017-11-07 | End: 2017-11-07

## 2017-11-07 RX ORDER — HYDROMORPHONE HYDROCHLORIDE 2 MG/ML
0.5 INJECTION INTRAMUSCULAR; INTRAVENOUS; SUBCUTANEOUS ONCE
Qty: 0 | Refills: 0 | Status: DISCONTINUED | OUTPATIENT
Start: 2017-11-07 | End: 2017-11-07

## 2017-11-07 RX ORDER — ACETAMINOPHEN 500 MG
650 TABLET ORAL EVERY 6 HOURS
Qty: 0 | Refills: 0 | Status: DISCONTINUED | OUTPATIENT
Start: 2017-11-07 | End: 2017-11-10

## 2017-11-07 RX ORDER — LIDOCAINE 4 G/100G
1 CREAM TOPICAL DAILY
Qty: 0 | Refills: 0 | Status: DISCONTINUED | OUTPATIENT
Start: 2017-11-07 | End: 2017-11-10

## 2017-11-07 RX ORDER — ASPIRIN/CALCIUM CARB/MAGNESIUM 324 MG
81 TABLET ORAL DAILY
Qty: 0 | Refills: 0 | Status: DISCONTINUED | OUTPATIENT
Start: 2017-11-07 | End: 2017-11-10

## 2017-11-07 RX ORDER — LIDOCAINE HCL 20 MG/ML
25 VIAL (ML) INJECTION ONCE
Qty: 0 | Refills: 0 | Status: DISCONTINUED | OUTPATIENT
Start: 2017-11-07 | End: 2017-11-07

## 2017-11-07 RX ORDER — OXYCODONE AND ACETAMINOPHEN 5; 325 MG/1; MG/1
2 TABLET ORAL EVERY 4 HOURS
Qty: 0 | Refills: 0 | Status: DISCONTINUED | OUTPATIENT
Start: 2017-11-07 | End: 2017-11-07

## 2017-11-07 RX ADMIN — MORPHINE SULFATE 1 MILLIGRAM(S): 50 CAPSULE, EXTENDED RELEASE ORAL at 23:31

## 2017-11-07 RX ADMIN — MIDODRINE HYDROCHLORIDE 10 MILLIGRAM(S): 2.5 TABLET ORAL at 21:19

## 2017-11-07 RX ADMIN — SODIUM CHLORIDE 3 MILLILITER(S): 9 INJECTION INTRAMUSCULAR; INTRAVENOUS; SUBCUTANEOUS at 21:22

## 2017-11-07 RX ADMIN — HYDROMORPHONE HYDROCHLORIDE 0.5 MILLIGRAM(S): 2 INJECTION INTRAMUSCULAR; INTRAVENOUS; SUBCUTANEOUS at 21:18

## 2017-11-07 RX ADMIN — CINACALCET 30 MILLIGRAM(S): 30 TABLET, FILM COATED ORAL at 11:24

## 2017-11-07 RX ADMIN — ATORVASTATIN CALCIUM 40 MILLIGRAM(S): 80 TABLET, FILM COATED ORAL at 21:19

## 2017-11-07 RX ADMIN — MIDODRINE HYDROCHLORIDE 10 MILLIGRAM(S): 2.5 TABLET ORAL at 14:12

## 2017-11-07 RX ADMIN — SODIUM CHLORIDE 3 MILLILITER(S): 9 INJECTION INTRAMUSCULAR; INTRAVENOUS; SUBCUTANEOUS at 05:59

## 2017-11-07 RX ADMIN — AMIODARONE HYDROCHLORIDE 200 MILLIGRAM(S): 400 TABLET ORAL at 06:14

## 2017-11-07 RX ADMIN — MIDODRINE HYDROCHLORIDE 10 MILLIGRAM(S): 2.5 TABLET ORAL at 06:14

## 2017-11-07 RX ADMIN — PANTOPRAZOLE SODIUM 40 MILLIGRAM(S): 20 TABLET, DELAYED RELEASE ORAL at 06:14

## 2017-11-07 RX ADMIN — LIDOCAINE 1 PATCH: 4 CREAM TOPICAL at 18:59

## 2017-11-07 RX ADMIN — Medication 650 MILLIGRAM(S): at 17:12

## 2017-11-07 RX ADMIN — SODIUM CHLORIDE 3 MILLILITER(S): 9 INJECTION INTRAMUSCULAR; INTRAVENOUS; SUBCUTANEOUS at 13:07

## 2017-11-07 RX ADMIN — Medication 650 MILLIGRAM(S): at 17:55

## 2017-11-07 RX ADMIN — HYDROMORPHONE HYDROCHLORIDE 0.5 MILLIGRAM(S): 2 INJECTION INTRAMUSCULAR; INTRAVENOUS; SUBCUTANEOUS at 22:02

## 2017-11-07 RX ADMIN — Medication 81 MILLIGRAM(S): at 11:24

## 2017-11-07 NOTE — PROGRESS NOTE ADULT - SUBJECTIVE AND OBJECTIVE BOX
Patient discussed on morning rounds with Dr. Lisa     Operation / Date: preop     SUBJECTIVE ASSESSMENT:  62y Male seen at bedside. Patient states he is feeling ok. Has some SOB while lying flat but otherwise no acute complaints. His is NPO for DARRIN today        Vital Signs Last 24 Hrs  T(C): 36.8 (07 Nov 2017 13:49), Max: 36.9 (06 Nov 2017 20:42)  T(F): 98.3 (07 Nov 2017 13:49), Max: 98.5 (06 Nov 2017 20:42)  HR: 118 (07 Nov 2017 14:04) (99 - 118)  BP: 103/49 (07 Nov 2017 14:04) (91/56 - 103/58)  BP(mean): 73 (07 Nov 2017 14:04) (72 - 77)  RR: 16 (07 Nov 2017 14:04) (16 - 16)  SpO2: 97% (07 Nov 2017 14:04) (94% - 100%)  I&O's Detail    07 Nov 2017 07:01  -  07 Nov 2017 14:49  --------------------------------------------------------  IN:  Total IN: 0 mL    OUT:  Total OUT: 0 mL    Total NET: 0 mL          CHEST TUBE:  Yes, on suction  MARK DRAIN:  No.  EPICARDIAL WIRES: No.  TIE DOWNS: No.  SANCHEZ: No.    PHYSICAL EXAM:    General: NAD, stable     Neurological: A&O x3, no focal deficits     Cardiovascular: irregular, + murmur    Respiratory: decreased BS at bases b/l    Gastrointestinal: + BS, soft, nontender    Extremities: no edema     Vascular: + pulses     Incision Sites: N/A    LABS:                        8.2    3.5   )-----------( 69       ( 07 Nov 2017 11:18 )             27.4       COUMADIN:  No    PT/INR - ( 07 Nov 2017 11:18 )   PT: 15.3 sec;   INR: 1.37          PTT - ( 07 Nov 2017 11:18 )  PTT:34.6 sec    11-07    141  |  96  |  34<H>  ----------------------------<  69<L>  5.0   |  29  |  6.48<H>    Ca    7.7<L>      07 Nov 2017 11:18  Phos  5.1     11-06  Mg     2.0     11-07    TPro  6.2  /  Alb  3.5  /  TBili  1.1  /  DBili  x   /  AST  9<L>  /  ALT  <5<L>  /  AlkPhos  62  11-06          MEDICATIONS  (STANDING):  amiodarone    Tablet 200 milliGRAM(s) Oral daily  aspirin enteric coated 81 milliGRAM(s) Oral daily  atorvastatin 40 milliGRAM(s) Oral at bedtime  cinacalcet 30 milliGRAM(s) Oral daily  lidocaine 2% Injectable 25 milliLiter(s) Local Injection once  midodrine 10 milliGRAM(s) Oral three times a day  pantoprazole    Tablet 40 milliGRAM(s) Oral before breakfast  sodium chloride 0.9% lock flush 3 milliLiter(s) IV Push every 8 hours    MEDICATIONS  (PRN):  metoclopramide Injectable 10 milliGRAM(s) IV Push once PRN N/V  ondansetron Injectable 4 milliGRAM(s) IV Push once PRN Nausea and/or Vomiting        62 year old male with history of ESRD on HD last HD today via L AV fistula (M/W/F), CAD / AV disease s/p CABG and AVR in 02/2017 c/b refractory heart failure s/p aortic PVL at [facility] in June 2017 being admitted under the care of Dr. Lisa with decompensated heart failure (SOB. DELEON.). DARRIN showing severe mitral regurgitation. Patient is being considered for MitraClip and / or other intervention      CVS:  - tachycardia with atrial tach vs. flutter  - HD stable, c/w midodrine for hypotension. f/u possible rate control. no dig secondary to ESRD on HD  - DARRIN today to evaluate MR    Pulm  - on NC  - b/l effusion on xray/US. s/p Right pigtail catheter. f/u xray stable. will follow up left effusion tomorrow    GI:  - on GI ppx, PO diet  - 15 lbs weight loss recently. will f/u with tumor markers      - ESRD on HD, last HD 11/6   - renal consulted. next HD 11/8 Patient discussed on morning rounds with Dr. Lisa     Operation / Date: preop     SUBJECTIVE ASSESSMENT:  62y Male seen at bedside. Patient states he is feeling ok. Has some SOB while lying flat but otherwise no acute complaints. His is NPO for DARRIN today        Vital Signs Last 24 Hrs  T(C): 36.8 (07 Nov 2017 13:49), Max: 36.9 (06 Nov 2017 20:42)  T(F): 98.3 (07 Nov 2017 13:49), Max: 98.5 (06 Nov 2017 20:42)  HR: 118 (07 Nov 2017 14:04) (99 - 118)  BP: 103/49 (07 Nov 2017 14:04) (91/56 - 103/58)  BP(mean): 73 (07 Nov 2017 14:04) (72 - 77)  RR: 16 (07 Nov 2017 14:04) (16 - 16)  SpO2: 97% (07 Nov 2017 14:04) (94% - 100%)  I&O's Detail    07 Nov 2017 07:01  -  07 Nov 2017 14:49  --------------------------------------------------------  IN:  Total IN: 0 mL    OUT:  Total OUT: 0 mL    Total NET: 0 mL          CHEST TUBE:  Yes, on suction  MARK DRAIN:  No.  EPICARDIAL WIRES: No.  TIE DOWNS: No.  SANCHEZ: No.    PHYSICAL EXAM:    General: NAD, stable     Neurological: A&O x3, no focal deficits     Cardiovascular: irregular, + murmur    Respiratory: decreased BS at bases b/l    Gastrointestinal: + BS, soft, nontender    Extremities: no edema     Vascular: + pulses     Incision Sites: N/A    LABS:                        8.2    3.5   )-----------( 69       ( 07 Nov 2017 11:18 )             27.4       COUMADIN:  No    PT/INR - ( 07 Nov 2017 11:18 )   PT: 15.3 sec;   INR: 1.37          PTT - ( 07 Nov 2017 11:18 )  PTT:34.6 sec    11-07    141  |  96  |  34<H>  ----------------------------<  69<L>  5.0   |  29  |  6.48<H>    Ca    7.7<L>      07 Nov 2017 11:18  Phos  5.1     11-06  Mg     2.0     11-07    TPro  6.2  /  Alb  3.5  /  TBili  1.1  /  DBili  x   /  AST  9<L>  /  ALT  <5<L>  /  AlkPhos  62  11-06          MEDICATIONS  (STANDING):  amiodarone    Tablet 200 milliGRAM(s) Oral daily  aspirin enteric coated 81 milliGRAM(s) Oral daily  atorvastatin 40 milliGRAM(s) Oral at bedtime  cinacalcet 30 milliGRAM(s) Oral daily  lidocaine 2% Injectable 25 milliLiter(s) Local Injection once  midodrine 10 milliGRAM(s) Oral three times a day  pantoprazole    Tablet 40 milliGRAM(s) Oral before breakfast  sodium chloride 0.9% lock flush 3 milliLiter(s) IV Push every 8 hours    MEDICATIONS  (PRN):  metoclopramide Injectable 10 milliGRAM(s) IV Push once PRN N/V  ondansetron Injectable 4 milliGRAM(s) IV Push once PRN Nausea and/or Vomiting        62 year old male with history of ESRD on HD last HD today via L AV fistula (M/W/F), CAD / AV disease s/p CABG and AVR in 02/2017 c/b refractory heart failure s/p aortic PVL at [facility] in June 2017 being admitted under the care of Dr. Lisa with decompensated heart failure (SOB. DELEON.). DARRIN showing severe mitral regurgitation. Patient is being considered for MitraClip and / or other intervention      CVS:  - tachycardia with atrial tach vs. flutter. EP consulted   - HD stable, c/w midodrine for hypotension. f/u possible rate control. no dig secondary to ESRD on HD  - DARRIN today to evaluate MR    Pulm  - on NC  - b/l effusion on xray/US. s/p Right pigtail catheter. f/u xray stable. will follow up left effusion tomorrow    GI:  - on GI ppx, PO diet  - 15 lbs weight loss recently. will f/u with tumor markers      - ESRD on HD, last HD 11/6   - renal consulted. next HD 11/8    Heme:  - anemia, likely due to anemia of chronic disease and chronic renal failure  - pt will need blood with dialysis tomorrow     ID:  - afebrile, WC 3.5. cont to monitor for signs of infx    Endo:  - no acute issues    DVT ppx  - c/w SQH, SCD     Dispo:  evaluation for MV intervention

## 2017-11-07 NOTE — CONSULT NOTE ADULT - ASSESSMENT
Patient is a 62 year old male with history of ESRD on HD last HD today via L AV fistula (M/W/F), CAD / AV disease s/p CABG and AVR in 02/2017 c/b refractory heart failure s/p aortic PVL at [facility] in June 2017 being admitted today (Transferred from OSH.) Nephrology consult called for ESRD on HD treatment.
61yo M with PMH of ESRD (on HD via L AV fistula M/W/F) secondary to IGA nephropathy s/p right nephrectomy and left partial nephrectomy, Thrombocytopenia, JADA, Gout, HTN, AFib (on Amio, no a/c), CAD / AV disease s/p CABG and AVR in 02/2017 c/b refractory heart failure s/p aortic PVL at [facility] in June 2017 transferred from OSH with decompensated heart failure (SOB. DELEON.). DARRIN showing severe mitral regurgitation. Patient is being considered for MitraClip and / or other intervention.  Ep consulted for atrial tach vs. atrial flutter.     - Trial low dose beta blocker to control HR  - No Digoxin  - No CCB as most recent EF is ~ 30%  - No AC currently due to thrombocytopenia. Reevaluate as thrombocytopenia improves.

## 2017-11-07 NOTE — CONSULT NOTE ADULT - SUBJECTIVE AND OBJECTIVE BOX
Patient is a 62y old  Male who presents with a chief complaint of MV Disease. (06 Nov 2017 22:25)      HPI:  Patient is a 62 year old male with history of ESRD on HD last HD today via L AV fistula (M/W/F), CAD / AV disease s/p CABG and AVR in 02/2017 c/b refractory heart failure s/p aortic PVL at [facility] in June 2017 being admitted today (Transferred from OSH.) under the care of Dr. Lisa with decompensated heart failure (SOB. DELEON.). DARRIN showing severe mitral regurgitation. Patient is being considered for MitraClip and / or other intervention. Patient currently without other complaints.     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  Diverticulitis s/p Sigmoid Colectomy c/b Perforation / Revision and Reversal  Herina Repair  Gout     FH:  None    SH:  Non-Smoker  No ETOH  No IVRDU  Lives at Home with Family    Home Rx.:  See Below    Allergies:  NKA (06 Nov 2017 22:25)      PAST MEDICAL & SURGICAL HISTORY:      Allergies    No Known Allergies    Intolerances        FAMILY HISTORY:      SOCIAL HISTORY:    MEDICATIONS  (STANDING):  amiodarone    Tablet 200 milliGRAM(s) Oral daily  aspirin enteric coated 81 milliGRAM(s) Oral daily  atorvastatin 40 milliGRAM(s) Oral at bedtime  cinacalcet 30 milliGRAM(s) Oral daily  midodrine 10 milliGRAM(s) Oral three times a day  pantoprazole    Tablet 40 milliGRAM(s) Oral before breakfast  sodium chloride 0.9% lock flush 3 milliLiter(s) IV Push every 8 hours    MEDICATIONS  (PRN):  metoclopramide Injectable 10 milliGRAM(s) IV Push once PRN N/V  ondansetron Injectable 4 milliGRAM(s) IV Push once PRN Nausea and/or Vomiting      REVIEW OF SYSTEMS:    CONSTITUTIONAL: No fever or chills, No fatigue or tiredness.  EYES: No blurred or double vision.  ENT: No recent URI or sore throat  RESPIRATORY: Mild shortness of breath, Denies any cough, hemoptysis  CARDIOVASCULAR: No Chest pain , palpitations, dizziness, syncope  GASTROINTESTINAL: NO abdominal or flank pain, No nausea or vomiting, No diarrhea  GENITOURINARY: No dysuria or urinary burning, No difficulty passing urine, No hematuria  NEUROLOGICAL: No headaches or blurred vision  SKIN: No skin rashes   MUSCULOSKELETAL: No arthralgia, Joint pain, leg edema, No muscle pains        PHYSICAL EXAM:  GENERAL: NAD, well-developed, well nourished, alert, awake, no acute distress at present  HEAD:  Atraumatic, Normocephalic,   EYES: Bilateral conjuctiva and sclera normal,  Oral cavity: Oral mucosa dry and pale  NECK: Neck supple, No JVD  CHEST/LUNG: BIlateral decreased breath sounds, Bibasilar rales and crepitations+nt, No wheezing  HEART: Regular rate and rhythm. ELEONORA III/VI at LPSB, No gallop, no rub   ABDOMEN: Soft, Nontender, BS+nt, No flank tenderness.   EXTREMITIES: No clubbing, cyanosis, or edema, no calf tenderness, Left upper extremity AV fisutla+nt with thrill  Neurology: AAOx3, no focal neurological deficit  SKIN: No rashes or lesions      CAPILLARY BLOOD GLUCOSE          I&O's Summary    07 Nov 2017 07:01  -  07 Nov 2017 11:10  --------------------------------------------------------  IN: 0 mL / OUT: 0 mL / NET: 0 mL          LABS:                                                   11-06-17 @ 22:32    141  |  97  |  33<H>  ----------------------------<  68<L>  5.2   |  28  |  5.87<H>    Ca    7.9<L>      06 Nov 2017 22:32  Phos  5.1     11-06  Mg     1.9     11-06    TPro  6.2  /  Alb  3.5  /  TBili  1.1  /  DBili  x   /  AST  9<L>  /  ALT  <5<L>  /  AlkPhos  62  11-06                          8.1    3.8   )-----------( 68       ( 06 Nov 2017 22:32 )             27.7     CBC Full  -  ( 06 Nov 2017 22:32 )  WBC Count : 3.8 K/uL  Hemoglobin : 8.1 g/dL  Hematocrit : 27.7 %  Platelet Count - Automated : 68 K/uL  Mean Cell Volume : 99.3 fL        PT/INR - ( 06 Nov 2017 22:32 )   PT: 14.2 sec;   INR: 1.27          PTT - ( 06 Nov 2017 22:32 )  PTT:35.4 sec          RADIOLOGY & ADDITIONAL TESTS:  < from: Xray Chest 1 View AP-PORTABLE IMMEDIATE (11.06.17 @ 22:40) >    EXAM:  XR CHEST 1 VIEW PORT IMMEDIATE                          PROCEDURE DATE:  11/06/2017                     INTERPRETATION:  CLINICAL INDICATION: 62-year-old for admission film.      FINDINGS: Portable view of the chest demonstrates midline trachea. Median   sternotomy. Pulmonary arterial hypertension. Cardiomegaly. Moderate right   and small left layering pleural effusions. Patchy consolidation within   the right lower lobe and right perihilar region. Probable left basilar   atelectasis.             "Thank you for the opportunity to participate in the care of this   patient."    DEO LUKE M.D., RADIOLOGY RESIDENT  This document has been electronically signed.  AIYANA SCOTT M.D., ATTENDING RADIOLOGIST  This document has been electronically signed. Nov 7 2017  8:44AM                  < end of copied text >        EKG/Telemetry: Reviewed

## 2017-11-07 NOTE — CONSULT NOTE ADULT - PROBLEM SELECTOR RECOMMENDATION 2
Anemia of chronic disease with hemoglobin 8.2 at present.  Obtain Iron studies  EPO during next HD treatment.  Transfuse PRBC as per CT surgery team.

## 2017-11-07 NOTE — CONSULT NOTE ADULT - PROBLEM SELECTOR RECOMMENDATION 9
ESRD on HD ( MWF) marcella GROVES ex ESRD on HD ( MWF) throught LUE fistula for past 7 years at [facility] at [phone].  Last HD 11/06 at his center.  No significnat fluid overload. K level 5.2 at present.   Will defer dialysis treatment today until need for anticipated CT surgery for MVR tomorrow.  Low K/Low phos/Renal diet.  Monitor respiratory status.  Will obtain data from [facility] [place].

## 2017-11-07 NOTE — CONSULT NOTE ADULT - PROBLEM SELECTOR RECOMMENDATION 3
Patient's calcium 7.9, Albumin 3.5. Phosphorus 5.1 at present.  On sensipar for now. May need to be discontinued due to hypocalcemia for now.  Obtain Vitamin D 25, Vitamin D 1,25 level and Intact PTH,   Low phosphorus diet

## 2017-11-07 NOTE — CONSULT NOTE ADULT - SUBJECTIVE AND OBJECTIVE BOX
CHIEF COMPLAINT: SOB/DELEON    HISTORY OF PRESENT ILLNESS: 62 year old male with history of ESRD on HD via L AV fistula (M/W/F) secondary to IGA nephropathy s/p right nephrectomy and left partial nephrectomy, Thrombocytopenia, JADA, Gout, HTN, AFib (on Amio, no a/c), CAD / AV disease s/p CABG and AVR in 02/2017 c/b refractory heart failure s/p aortic PVL at [facility] in June 2017 transferred from OSH with decompensated heart failure (SOB. DELEON.). DARRIN showing severe mitral regurgitation. Patient is being considered for MitraClip and / or other intervention.  Found to be in atrial tach vs. atrial flutter, called for further recommendations.  He denies awareness of rapid/irregular heart action.    PAST MEDICAL & SURGICAL HISTORY:  s/p IVC filter  s/p hernia repair      PERTINENT DIAGNOSTIC TESTING:    PENDING DARRIN TODAY      Allergies    No Known Allergies    Intolerances    	    MEDICATIONS:  amiodarone    Tablet 200 milliGRAM(s) Oral daily  midodrine 10 milliGRAM(s) Oral three times a day  metoclopramide Injectable 10 milliGRAM(s) IV Push once PRN  ondansetron Injectable 4 milliGRAM(s) IV Push once PRN  pantoprazole    Tablet 40 milliGRAM(s) Oral before breakfast  atorvastatin 40 milliGRAM(s) Oral at bedtime  aspirin enteric coated 81 milliGRAM(s) Oral daily    FAMILY HISTORY:    SOCIAL HISTORY:    [ ] Non-smoker  [ ] Smoker  [ ] Alcohol        REVIEW OF SYSTEMS:    [x ] All others negative	  [ ] Unable to obtain    PHYSICAL EXAM:  T(C): 36.8 (11-07-17 @ 13:49), Max: 36.9 (11-06-17 @ 20:42)  HR: 118 (11-07-17 @ 14:04) (99 - 118)  BP: 103/49 (11-07-17 @ 14:04) (91/56 - 103/58)  RR: 16 (11-07-17 @ 14:04) (16 - 16)  SpO2: 97% (11-07-17 @ 14:04) (94% - 100%)  Wt(kg): --  I&O's Summary    07 Nov 2017 07:01  -  07 Nov 2017 15:38  --------------------------------------------------------  IN: 0 mL / OUT: 0 mL / NET: 0 mL        TELEMETRY:   ECG:     Appearance: Normal	  HEENT:   Normal oral mucosa, PERRL, EOMI	  Cardiovascular: Normal S1 S2, No JVD, No murmurs, No edema  Respiratory: Lungs clear to auscultation	  Gastrointestinal:  Soft, Non-tender, + BS	  Neurologic: A&O x 3, Non-focal  Extremities: Normal range of motion, No clubbing, cyanosis or edema  Vascular: Peripheral pulses palpable 2+ bilaterally    	  LABS:	 	    CARDIAC MARKERS:                                  8.2    3.5   )-----------( 69       ( 07 Nov 2017 11:18 )             27.4     11-07    141  |  96  |  34<H>  ----------------------------<  69<L>  5.0   |  29  |  6.48<H>    Ca    7.7<L>      07 Nov 2017 11:18  Phos  5.1     11-06  Mg     2.0     11-07    TPro  6.2  /  Alb  3.5  /  TBili  1.1  /  DBili  x   /  AST  9<L>  /  ALT  <5<L>  /  AlkPhos  62  11-06    proBNP: Serum Pro-Brain Natriuretic Peptide: >67258 pg/mL (11-06 @ 22:32)    Lipid Profile:   HgA1c: Hemoglobin A1C, Whole Blood: <4.2 % (11-06 @ 22:32)    TSH: Thyroid Stimulating Hormone, Serum: 6.782 uIU/mL (11-06 @ 22:32)      ASSESSMENT/PLAN: CHIEF COMPLAINT: SOB/DELEON    HISTORY OF PRESENT ILLNESS: 62 year old male with history of ESRD on HD via L AV fistula (M/W/F) secondary to IGA nephropathy s/p right nephrectomy and left partial nephrectomy, Thrombocytopenia, JADA, Gout, HTN, AFib (on Amio, no a/c), CAD / AV disease s/p CABG and AVR in 02/2017 c/b refractory heart failure s/p aortic PVL at [facility] in June 2017 transferred from OSH with decompensated heart failure (SOB. DELEON.). DARRIN showing severe mitral regurgitation. Patient is being considered for MitraClip and / or other intervention.  Found to be in atrial tach vs. atrial flutter, called for further recommendations.  He denies awareness of rapid/irregular heart action.    PAST MEDICAL & SURGICAL HISTORY:  s/p IVC filter  s/p hernia repair      PERTINENT DIAGNOSTIC TESTING:    Cardiac Echo TTE 11/07/17  - Mild concentric left ventricular hypertrophy. Septal knuckle measuring 2cm without evidence of LVOT obstruction.  The left ventricle is moderately dilated.  - Severe segmental left ventricular systolic dysfunction with akinesis of the inferoseptum. The inferior wall is hypokinetic.   - The left ventricular ejection fraction is severely reduced. The left ventricular ejection fraction is 30%.Moderate to severe right ventricular dilatation.  - The right ventricular systolic function is mildly reduced.  - The left atrium is severely dilated. The left atrial volume index is 81 cc/m2 (normal <34cc/m2).  - The right atrium is dilated.   - Bioprosthetic aortic valve.    Mild aortic regurgitation. The aortic regurgitation is paravalvular. The peak pressure gradient is 12mmHg. The mean pressure gradient is 6 mmHg.   - Severe mitral annular calcification with restriction of the posterior leaflet of the mitral valve.   - Severe mitral regurgitation. The effective regurgitant orifice, calculated by the PISA method, is 0.5 cm2. The regurgitant volume, based   on the PISA calculation, is 76 ml. The mean transmitral gradient is 4 mmHg at a heart rate of 112 bpm.  - Mild tricuspid regurgitation.  - Mild pulmonary hypertension. The pulmonary artery systolic pressure is estimated to be 48 mmHg.  - Trace pulmonic regurgitation.  - Borderline aortic root dilatation. Mild dilatation of the ascending aorta measuring 4.1 cm 5 cm from the aortic annulus.   - Bilateral pleural effusions noted.  - Ascites noted.      Cardiac Echo DARRIN 11/07/17  Patient was tachycardic during the study.   - Severe segmental left ventricular dysfunction with akinesis of inferoseptum.  - Left ventricular ejection fraction is 30%.  - The right ventricle is dilated. The right ventricular systolic function is moderately reduced.  - The left atrium is severely dilated.  ** No clot seen in the left atrium or in the left atrial appendage.   - The right atrium is dilated.  - Bioprosthetic aortic valve.  - Mild to moderate paravalvular aortic regurgitation. The peak pressure gradient is 10 mmHg. The mean pressure   gradient is 5 mmHg.  - Mitral annular calcification noted. The posterior leaflet of the mitral valve appears restricted. Multiplane 3D reconstruction reveals a   MV area of 6 sqcm at leaflet tips.   - There is severe mitral regurgitation. The effective regurgitant orifice, calculated by the PISA method, is 0.6cm2. The   regurgitant volume, based on the PISA calculation, is 75 ml.  - Mild tricuspid regurgitation.   - Severe, non-mobile plaque is seen in the visualized portions of the aorta and aortic arch.        Allergies    No Known Allergies    Intolerances    	    MEDICATIONS:  amiodarone    Tablet 200 milliGRAM(s) Oral daily  midodrine 10 milliGRAM(s) Oral three times a day  metoclopramide Injectable 10 milliGRAM(s) IV Push once PRN  ondansetron Injectable 4 milliGRAM(s) IV Push once PRN  pantoprazole    Tablet 40 milliGRAM(s) Oral before breakfast  atorvastatin 40 milliGRAM(s) Oral at bedtime  aspirin enteric coated 81 milliGRAM(s) Oral daily    FAMILY HISTORY:    SOCIAL HISTORY:    [ ] Non-smoker  [ ] Smoker  [ ] Alcohol        REVIEW OF SYSTEMS:    [x ] All others negative	  [ ] Unable to obtain    PHYSICAL EXAM:  T(C): 36.8 (11-07-17 @ 13:49), Max: 36.9 (11-06-17 @ 20:42)  HR: 118 (11-07-17 @ 14:04) (99 - 118)  BP: 103/49 (11-07-17 @ 14:04) (91/56 - 103/58)  RR: 16 (11-07-17 @ 14:04) (16 - 16)  SpO2: 97% (11-07-17 @ 14:04) (94% - 100%)  Wt(kg): --  I&O's Summary    07 Nov 2017 07:01  -  07 Nov 2017 15:38  --------------------------------------------------------  IN: 0 mL / OUT: 0 mL / NET: 0 mL        TELEMETRY:   ECG:     Appearance: Normal	  HEENT:   Normal oral mucosa, PERRL, EOMI	  Cardiovascular: Tachycardia, Normal S1 S2, No JVD, No murmurs, No edema  Respiratory: Lungs clear to auscultation	  Gastrointestinal:  Soft, Non-tender, + BS	  Neurologic: A&O x 3, Non-focal  Extremities: Normal range of motion, No clubbing, cyanosis or edema  Vascular: Peripheral pulses palpable 2+ bilaterally    	  LABS:	 	                        8.2    3.5   )-----------( 69       ( 07 Nov 2017 11:18 )             27.4     11-07    141  |  96  |  34<H>  ----------------------------<  69<L>  5.0   |  29  |  6.48<H>    Ca    7.7<L>      07 Nov 2017 11:18  Phos  5.1     11-06  Mg     2.0     11-07    TPro  6.2  /  Alb  3.5  /  TBili  1.1  /  DBili  x   /  AST  9<L>  /  ALT  <5<L>  /  AlkPhos  62  11-06    proBNP: Serum Pro-Brain Natriuretic Peptide: >11688 pg/mL (11-06 @ 22:32)    Lipid Profile:   HgA1c: Hemoglobin A1C, Whole Blood: <4.2 % (11-06 @ 22:32)    TSH: Thyroid Stimulating Hormone, Serum: 6.782 uIU/mL (11-06 @ 22:32)      ASSESSMENT/PLAN:

## 2017-11-07 NOTE — CONSULT NOTE ADULT - ATTENDING COMMENTS
ESRD HD MWF last HD session Monday, euvolemic, mild hyperkalemia, low K diet, HD tmrw pre procedure. Decompensated heart failure 2/2 MR, euvolemic, no need for UF today. anemia - check Fe studies, epo tmrw with HD. BMD: check ipth, vit D, ph - cont sensipar for now, check ionized calcium for hypocalcemia

## 2017-11-08 DIAGNOSIS — I34.0 NONRHEUMATIC MITRAL (VALVE) INSUFFICIENCY: ICD-10-CM

## 2017-11-08 DIAGNOSIS — I13.2 HYPERTENSIVE HEART AND CHRONIC KIDNEY DISEASE WITH HEART FAILURE AND WITH STAGE 5 CHRONIC KIDNEY DISEASE, OR END STAGE RENAL DISEASE: ICD-10-CM

## 2017-11-08 DIAGNOSIS — I25.10 ATHEROSCLEROTIC HEART DISEASE OF NATIVE CORONARY ARTERY WITHOUT ANGINA PECTORIS: ICD-10-CM

## 2017-11-08 DIAGNOSIS — E83.39 OTHER DISORDERS OF PHOSPHORUS METABOLISM: ICD-10-CM

## 2017-11-08 DIAGNOSIS — I50.9 HEART FAILURE, UNSPECIFIED: ICD-10-CM

## 2017-11-08 DIAGNOSIS — M10.9 GOUT, UNSPECIFIED: ICD-10-CM

## 2017-11-08 DIAGNOSIS — K57.90 DIVERTICULOSIS OF INTESTINE, PART UNSPECIFIED, WITHOUT PERFORATION OR ABSCESS WITHOUT BLEEDING: ICD-10-CM

## 2017-11-08 DIAGNOSIS — Z95.2 PRESENCE OF PROSTHETIC HEART VALVE: ICD-10-CM

## 2017-11-08 DIAGNOSIS — D63.1 ANEMIA IN CHRONIC KIDNEY DISEASE: ICD-10-CM

## 2017-11-08 DIAGNOSIS — D69.6 THROMBOCYTOPENIA, UNSPECIFIED: ICD-10-CM

## 2017-11-08 DIAGNOSIS — E87.5 HYPERKALEMIA: ICD-10-CM

## 2017-11-08 DIAGNOSIS — N18.6 END STAGE RENAL DISEASE: ICD-10-CM

## 2017-11-08 DIAGNOSIS — J81.1 CHRONIC PULMONARY EDEMA: ICD-10-CM

## 2017-11-08 DIAGNOSIS — Z95.1 PRESENCE OF AORTOCORONARY BYPASS GRAFT: ICD-10-CM

## 2017-11-08 DIAGNOSIS — R07.9 CHEST PAIN, UNSPECIFIED: ICD-10-CM

## 2017-11-08 DIAGNOSIS — Z99.2 DEPENDENCE ON RENAL DIALYSIS: ICD-10-CM

## 2017-11-08 DIAGNOSIS — G47.30 SLEEP APNEA, UNSPECIFIED: ICD-10-CM

## 2017-11-08 DIAGNOSIS — I95.9 HYPOTENSION, UNSPECIFIED: ICD-10-CM

## 2017-11-08 DIAGNOSIS — I25.2 OLD MYOCARDIAL INFARCTION: ICD-10-CM

## 2017-11-08 DIAGNOSIS — I48.91 UNSPECIFIED ATRIAL FIBRILLATION: ICD-10-CM

## 2017-11-08 LAB
AFP-TM SERPL-MCNC: 0.8 NG/ML — SIGNIFICANT CHANGE UP
ANION GAP SERPL CALC-SCNC: 15 MMOL/L — SIGNIFICANT CHANGE UP (ref 5–17)
APTT BLD: 36.3 SEC — SIGNIFICANT CHANGE UP (ref 27.5–37.4)
BUN SERPL-MCNC: 19 MG/DL — SIGNIFICANT CHANGE UP (ref 7–23)
BUN SERPL-MCNC: 41 MG/DL — HIGH (ref 7–23)
CALCIUM SERPL-MCNC: 7.5 MG/DL — LOW (ref 8.4–10.5)
CANCER AG125 SERPL-ACNC: 38 U/ML — HIGH
CHLORIDE SERPL-SCNC: 97 MMOL/L — SIGNIFICANT CHANGE UP (ref 96–108)
CO2 SERPL-SCNC: 29 MMOL/L — SIGNIFICANT CHANGE UP (ref 22–31)
CREAT SERPL-MCNC: 7.71 MG/DL — HIGH (ref 0.5–1.3)
FERRITIN SERPL-MCNC: 1285 NG/ML — HIGH (ref 30–400)
GLUCOSE SERPL-MCNC: 66 MG/DL — LOW (ref 70–99)
HCT VFR BLD CALC: 28.3 % — LOW (ref 39–50)
HGB BLD-MCNC: 8.3 G/DL — LOW (ref 13–17)
INR BLD: 1.38 — HIGH (ref 0.88–1.16)
IRON SATN MFR SERPL: 22 % — SIGNIFICANT CHANGE UP (ref 16–55)
IRON SATN MFR SERPL: 32 UG/DL — LOW (ref 45–165)
MAGNESIUM SERPL-MCNC: 2 MG/DL — SIGNIFICANT CHANGE UP (ref 1.6–2.6)
MCHC RBC-ENTMCNC: 28.8 PG — SIGNIFICANT CHANGE UP (ref 27–34)
MCHC RBC-ENTMCNC: 29.3 G/DL — LOW (ref 32–36)
MCV RBC AUTO: 98.3 FL — SIGNIFICANT CHANGE UP (ref 80–100)
PLATELET # BLD AUTO: 77 K/UL — LOW (ref 150–400)
POTASSIUM SERPL-MCNC: 6.1 MMOL/L — HIGH (ref 3.5–5.3)
POTASSIUM SERPL-SCNC: 6.1 MMOL/L — HIGH (ref 3.5–5.3)
PROTHROM AB SERPL-ACNC: 15.4 SEC — HIGH (ref 9.8–12.7)
RBC # BLD: 2.88 M/UL — LOW (ref 4.2–5.8)
RBC # FLD: 18.9 % — HIGH (ref 10.3–16.9)
SODIUM SERPL-SCNC: 141 MMOL/L — SIGNIFICANT CHANGE UP (ref 135–145)
TIBC SERPL-MCNC: 146 UG/DL — LOW (ref 220–430)
UIBC SERPL-MCNC: 114 UG/DL — SIGNIFICANT CHANGE UP (ref 110–370)
WBC # BLD: 4.8 K/UL — SIGNIFICANT CHANGE UP (ref 3.8–10.5)
WBC # FLD AUTO: 4.8 K/UL — SIGNIFICANT CHANGE UP (ref 3.8–10.5)

## 2017-11-08 PROCEDURE — 99232 SBSQ HOSP IP/OBS MODERATE 35: CPT

## 2017-11-08 PROCEDURE — 71010: CPT | Mod: 26

## 2017-11-08 RX ORDER — ERYTHROPOIETIN 10000 [IU]/ML
10000 INJECTION, SOLUTION INTRAVENOUS; SUBCUTANEOUS ONCE
Qty: 0 | Refills: 0 | Status: DISCONTINUED | OUTPATIENT
Start: 2017-11-08 | End: 2017-11-10

## 2017-11-08 RX ORDER — OXYCODONE AND ACETAMINOPHEN 5; 325 MG/1; MG/1
2 TABLET ORAL EVERY 6 HOURS
Qty: 0 | Refills: 0 | Status: DISCONTINUED | OUTPATIENT
Start: 2017-11-08 | End: 2017-11-10

## 2017-11-08 RX ORDER — ALBUMIN HUMAN 25 %
50 VIAL (ML) INTRAVENOUS
Qty: 0 | Refills: 0 | Status: DISCONTINUED | OUTPATIENT
Start: 2017-11-08 | End: 2017-11-10

## 2017-11-08 RX ORDER — MORPHINE SULFATE 50 MG/1
1 CAPSULE, EXTENDED RELEASE ORAL ONCE
Qty: 0 | Refills: 0 | Status: DISCONTINUED | OUTPATIENT
Start: 2017-11-08 | End: 2017-11-10

## 2017-11-08 RX ORDER — CALCITRIOL 0.5 UG/1
2 CAPSULE ORAL ONCE
Qty: 0 | Refills: 0 | Status: COMPLETED | OUTPATIENT
Start: 2017-11-08 | End: 2017-11-08

## 2017-11-08 RX ORDER — ALPRAZOLAM 0.25 MG
0.25 TABLET ORAL ONCE
Qty: 0 | Refills: 0 | Status: DISCONTINUED | OUTPATIENT
Start: 2017-11-08 | End: 2017-11-08

## 2017-11-08 RX ADMIN — Medication 50 MILLILITER(S): at 10:45

## 2017-11-08 RX ADMIN — MIDODRINE HYDROCHLORIDE 10 MILLIGRAM(S): 2.5 TABLET ORAL at 22:20

## 2017-11-08 RX ADMIN — Medication 0.25 MILLIGRAM(S): at 22:39

## 2017-11-08 RX ADMIN — SODIUM CHLORIDE 3 MILLILITER(S): 9 INJECTION INTRAMUSCULAR; INTRAVENOUS; SUBCUTANEOUS at 14:08

## 2017-11-08 RX ADMIN — OXYCODONE AND ACETAMINOPHEN 2 TABLET(S): 5; 325 TABLET ORAL at 14:50

## 2017-11-08 RX ADMIN — Medication 50 MILLILITER(S): at 11:54

## 2017-11-08 RX ADMIN — MIDODRINE HYDROCHLORIDE 10 MILLIGRAM(S): 2.5 TABLET ORAL at 05:49

## 2017-11-08 RX ADMIN — SODIUM CHLORIDE 3 MILLILITER(S): 9 INJECTION INTRAMUSCULAR; INTRAVENOUS; SUBCUTANEOUS at 22:19

## 2017-11-08 RX ADMIN — MORPHINE SULFATE 1 MILLIGRAM(S): 50 CAPSULE, EXTENDED RELEASE ORAL at 00:00

## 2017-11-08 RX ADMIN — ERYTHROPOIETIN 10000 UNIT(S): 10000 INJECTION, SOLUTION INTRAVENOUS; SUBCUTANEOUS at 11:00

## 2017-11-08 RX ADMIN — OXYCODONE AND ACETAMINOPHEN 2 TABLET(S): 5; 325 TABLET ORAL at 21:30

## 2017-11-08 RX ADMIN — ATORVASTATIN CALCIUM 40 MILLIGRAM(S): 80 TABLET, FILM COATED ORAL at 22:20

## 2017-11-08 RX ADMIN — SODIUM CHLORIDE 3 MILLILITER(S): 9 INJECTION INTRAMUSCULAR; INTRAVENOUS; SUBCUTANEOUS at 06:01

## 2017-11-08 RX ADMIN — PANTOPRAZOLE SODIUM 40 MILLIGRAM(S): 20 TABLET, DELAYED RELEASE ORAL at 05:48

## 2017-11-08 RX ADMIN — MIDODRINE HYDROCHLORIDE 10 MILLIGRAM(S): 2.5 TABLET ORAL at 14:06

## 2017-11-08 RX ADMIN — LIDOCAINE 1 PATCH: 4 CREAM TOPICAL at 06:41

## 2017-11-08 RX ADMIN — Medication 81 MILLIGRAM(S): at 14:06

## 2017-11-08 RX ADMIN — Medication 10 MILLIGRAM(S): at 21:49

## 2017-11-08 RX ADMIN — AMIODARONE HYDROCHLORIDE 200 MILLIGRAM(S): 400 TABLET ORAL at 05:48

## 2017-11-08 RX ADMIN — CALCITRIOL 2 MICROGRAM(S): 0.5 CAPSULE ORAL at 11:07

## 2017-11-08 RX ADMIN — OXYCODONE AND ACETAMINOPHEN 2 TABLET(S): 5; 325 TABLET ORAL at 20:35

## 2017-11-08 RX ADMIN — OXYCODONE AND ACETAMINOPHEN 2 TABLET(S): 5; 325 TABLET ORAL at 15:49

## 2017-11-08 NOTE — PROGRESS NOTE ADULT - SUBJECTIVE AND OBJECTIVE BOX
Patient discussed on morning rounds with Dr. Lisa    Operation / Date: pre-op mitral clip    SUBJECTIVE ASSESSMENT:  62y Male seen and examined. Today patient reports pain at chest tube site. he is ambulating in hallway, having normal BMs.  He denies fever, chills AMS, chest pain, cough, SOB, orthopnea, abdominal pain, n/v/d/c.         Vital Signs Last 24 Hrs  T(C): 36.7 (08 Nov 2017 13:30), Max: 37.6 (08 Nov 2017 10:30)  T(F): 98 (08 Nov 2017 13:30), Max: 99.6 (08 Nov 2017 10:30)  HR: 114 (08 Nov 2017 14:00) (93 - 118)  BP: 104/60 (08 Nov 2017 14:00) (84/45 - 114/66)  BP(mean): 75 (08 Nov 2017 14:00) (62 - 84)  RR: 19 (08 Nov 2017 14:00) (16 - 20)  SpO2: 96% (08 Nov 2017 14:00) (96% - 100%)  I&O's Detail    07 Nov 2017 07:01  -  08 Nov 2017 07:00  --------------------------------------------------------  IN:  Total IN: 0 mL    OUT:    Chest Tube: 1400 mL  Total OUT: 1400 mL    Total NET: -1400 mL      08 Nov 2017 07:01  -  08 Nov 2017 15:11  --------------------------------------------------------  IN:  Total IN: 0 mL    OUT:    Chest Tube: 80 mL  Total OUT: 80 mL    Total NET: -80 mL          CHEST TUBE:  Yes R pigtail on suction no airleak, total drainage about 1500cc  MARK DRAIN:  No.  EPICARDIAL WIRES: No.  TIE DOWNS: No.  SANCHEZ: No.    PHYSICAL EXAM:    General: sitting comfortably in bed, no acute distress    Neurological: awake alert and oriented, no neuro deficits     Cardiovascular: S1S2, irregular rhythm, + III/VI systolic murmur     Respiratory: coarse breathsounds throughout, no wheeze/rhonchi/rales    Gastrointestinal: +BS, soft nontender, nondistended     Extremities: warm and well perfused, no edema, no calf tenderness    Vascular: 2+ radial and DP pulses b/l    Incision Sites: R pigtail: CDI    LABS:                        8.3    4.8   )-----------( 77       ( 08 Nov 2017 06:54 )             28.3       COUMADIN:  No    PT/INR - ( 08 Nov 2017 06:54 )   PT: 15.4 sec;   INR: 1.38          PTT - ( 08 Nov 2017 06:54 )  PTT:36.3 sec    11-08    x   |  x   |  19  ----------------------------<  x   x    |  x   |  x     Ca    7.5<L>      08 Nov 2017 06:54  Phos  5.1     11-06  Mg     2.0     11-08    TPro  6.2  /  Alb  3.5  /  TBili  1.1  /  DBili  x   /  AST  9<L>  /  ALT  <5<L>  /  AlkPhos  62  11-06          MEDICATIONS  (STANDING):  amiodarone    Tablet 200 milliGRAM(s) Oral daily  aspirin enteric coated 81 milliGRAM(s) Oral daily  atorvastatin 40 milliGRAM(s) Oral at bedtime  epoetin thor Injectable 61956 Unit(s) IV Push once  lidocaine   Patch 1 Patch Transdermal daily  midodrine 10 milliGRAM(s) Oral three times a day  morphine  - Injectable 1 milliGRAM(s) IV Push once  pantoprazole    Tablet 40 milliGRAM(s) Oral before breakfast  sodium chloride 0.9% lock flush 3 milliLiter(s) IV Push every 8 hours    MEDICATIONS  (PRN):  acetaminophen   Tablet. 650 milliGRAM(s) Oral every 6 hours PRN Moderate Pain (4 - 6)  albumin human 25% IVPB 50 milliLiter(s) IV Intermittent every 1 hour PRN hypotension  albumin human 25% IVPB 50 milliLiter(s) IV Intermittent every 1 hour PRN hypotension  albumin human 25% IVPB 50 milliLiter(s) IV Intermittent every 1 hour PRN hypotension  metoclopramide Injectable 10 milliGRAM(s) IV Push once PRN N/V  ondansetron Injectable 4 milliGRAM(s) IV Push once PRN Nausea and/or Vomiting  oxyCODONE    5 mG/acetaminophen 325 mG 2 Tablet(s) Oral every 6 hours PRN Severe Pain (7 - 10)        RADIOLOGY & ADDITIONAL TESTS:  < from: Xray Chest 1 View AP -PORTABLE-Routine (11.08.17 @ 05:58) >  Findings: Again status post valve replacement surgery. Right-sided chest   tube again present. No pneumothorax. Small pleural effusion on the right,   partially loculated within the minor fissure. There may also be small   left pleural effusion.    Impression: No significant change.    < end of copied text >

## 2017-11-08 NOTE — PROGRESS NOTE ADULT - SUBJECTIVE AND OBJECTIVE BOX
Patient was seen and evaluated on dialysis.   Patient is tolerating the procedure well.   HR: 100 (11-08-17 @ 08:57)  BP: 84/45 (11-08-17 @ 08:57)  Continue dialysis:   Dialyzer: 180 Optiflux         QB: 400     QD: 500 2K+  Goal UF 0 to 0.5 Kg over 3 Hours     PRBC during HD  IV calcijex and EPO during HD treatment.

## 2017-11-08 NOTE — PROGRESS NOTE ADULT - SUBJECTIVE AND OBJECTIVE BOX
EPS Progress Note    S:  No acute events overnight.     O: T(C): 36.8 (11-08-17 @ 09:40), Max: 36.8 (11-07-17 @ 13:49)  HR: 100 (11-08-17 @ 08:57) (95 - 118)  BP: 84/45 (11-08-17 @ 08:57) (84/45 - 114/66)  RR: 17 (11-08-17 @ 08:57) (16 - 17)  SpO2: 97% (11-08-17 @ 08:57) (97% - 100%)    TELE: Patient with HR 80s-100s, occasional couplets    PHYSICAL  General:  NAD        Chest:  CTA B/L, no w/r/r  Cardiac:  RRR, + s1/s2 , no m/g/r  Abdomen:  +BS , soft ND/ NT  Extremities: No edema    LABS:                        8.3    4.8   )-----------( 77       ( 08 Nov 2017 06:54 )             28.3     11-08    141  |  97  |  41<H>  ----------------------------<  66<L>  6.1<H>   |  29  |  7.71<H>    Ca    7.5<L>      08 Nov 2017 06:54  Phos  5.1     11-06  Mg     2.0     11-08    TPro  6.2  /  Alb  3.5  /  TBili  1.1  /  DBili  x   /  AST  9<L>  /  ALT  <5<L>  /  AlkPhos  62  11-06    PT/INR - ( 08 Nov 2017 06:54 )   PT: 15.4 sec;   INR: 1.38     PTT - ( 08 Nov 2017 06:54 )  PTT:36.3 sec      MEDICATIONS:  acetaminophen   Tablet. 650 milliGRAM(s) Oral every 6 hours PRN  albumin human 25% IVPB 50 milliLiter(s) IV Intermittent every 1 hour PRN  albumin human 25% IVPB 50 milliLiter(s) IV Intermittent every 1 hour PRN  albumin human 25% IVPB 50 milliLiter(s) IV Intermittent every 1 hour PRN  amiodarone    Tablet 200 milliGRAM(s) Oral daily  aspirin enteric coated 81 milliGRAM(s) Oral daily  atorvastatin 40 milliGRAM(s) Oral at bedtime  epoetin thor Injectable 93844 Unit(s) IV Push once  lidocaine   Patch 1 Patch Transdermal daily  metoclopramide Injectable 10 milliGRAM(s) IV Push once PRN  midodrine 10 milliGRAM(s) Oral three times a day  morphine  - Injectable 1 milliGRAM(s) IV Push once  ondansetron Injectable 4 milliGRAM(s) IV Push once PRN  pantoprazole    Tablet 40 milliGRAM(s) Oral before breakfast  sodium chloride 0.9% lock flush 3 milliLiter(s) IV Push every 8 hours      ASSESSMENT/PLAN

## 2017-11-08 NOTE — PROGRESS NOTE ADULT - ASSESSMENT
62 year old M PMHx ESRD on HD (L AV fisula- M/W/F), CAD and known AV disease s/p CABG and AVR 2/2017 complicated by HF s/p aortic PVL at [facility] in 6/2017 admitted 11/6/7 under the care of Dr. Lisa with CHF exacerbation.  DARRIN revealed severe MR and EF 30%, being considered for mitral clip.  Patient had R pigtail cath placed yesterday, draining about 1500cc. Patient had HD today along with 1 UPRBC.  L side u/s at bedside, revealed about 400cc, but patient asymptomatic.       Neurovascular: No delirium. Pain well controlled with current regimen.  -continue tylenol and percocet PRN     Cardiovascular: Hemodynamically stable. HR controlled.   -Hx CABG/AVR- continue asa, statin  -EP consulted for atrial tachy vs flutter- continue amio 200mg daily  -hypotension- continue midodrine    Respiratory: 02 Sat = 98% on 2LNC  -Encourage ambulation, C+DB and Use of IS 10x / hr while awake.  -CXR- R pleural effusion, small L pleural effusion, f/u AM CXR  -R pigtail in place- drained abotu 1500cc, on suction  -U/S L side- about 400cc, patient asymptomatic, no pigtail at this time   -monitor SpO2    GI: Stable.  -continue pantoprazole for GI PPX.  -PO Diet.  -f/u tumor markers for 15lb weight loss    Renal / : ESRD on HD (HD today)  -Monitor renal function.  -Monitor I/O's.  -renal following- HD today- BUN/Cr 41/7.71    Endocrine:    -A1c 4.2    Hematologic: H&H stable  -1UPRBC today during HD    ID: afebrile, WBC 4.8  -Observe for SIRS/Sepsis Syndrome.    Prophylaxis:  -SCD's    Disposition:  -possible mitral clip

## 2017-11-08 NOTE — PROGRESS NOTE ADULT - ASSESSMENT
62 year old male with history of ESRD on HD last HD today via L AV fistula (M/W/F), CAD / AV disease s/p CABG and AVR in 02/2017 c/b refractory heart failure s/p aortic PVL at [facility] in June 2017 being admitted under the care of Dr. Lisa with decompensated heart failure (SOB. DELEON.). DARRIN showing severe mitral regurgitation and EF 30%. Patient is being considered for MitraClip and / or other interventions. EP consulted for tachycardia.     - Patient now with improved HR since initiation of low dose beta blocker and continue Amio  - No digoxin due to 62 year old male with history of ESRD on HD last HD today via L AV fistula (M/W/F), CAD / AV disease s/p CABG and AVR in 02/2017 c/b refractory heart failure s/p aortic PVL at [facility] in June 2017 being admitted under the care of Dr. Lisa with decompensated heart failure (SOB, DELEON). DARRIN showing severe mitral regurgitation and EF 30%. Patient is being considered for MitraClip and / or other interventions. EP consulted for tachycardia.     - Patient now with improved HR since yesterday since initiation of low dose beta blocker and continue Amio.  - Continue BB and Amio  - Continue to monitor thrombocytopenia and restart AC when medically appropriate. 62 year old male with history of ESRD on HD last HD today via L AV fistula (M/W/F), CAD / AV disease s/p CABG and AVR in 02/2017 c/b refractory heart failure s/p aortic PVL at [facility] in June 2017 being admitted under the care of Dr. Lisa with decompensated heart failure (SOB, DELEON). DARRIN showing severe mitral regurgitation and EF 30%. Patient is being considered for MitraClip and / or other interventions. EP consulted for tachycardia AT vs  AF.    - Patient now with improved HR since yesterday since initiation of low dose beta blocker and continue Amio  - Continue BB and Amio  - Continue to monitor thrombocytopenia and consider AC when medically appropriate

## 2017-11-08 NOTE — PROGRESS NOTE ADULT - ASSESSMENT
Patient is a 62 year old male with history of ESRD on HD last HD today via L AV fistula (M/W/F), CAD / AV disease s/p CABG and AVR in 02/2017 c/b refractory heart failure s/p aortic PVL at [facility] in June 2017 being admitted today (Transferred from OSH.) Nephrology consult called for ESRD on HD treatment. Patient with interval placement of Right chest tube for pleural effusion.

## 2017-11-08 NOTE — PROGRESS NOTE ADULT - PROBLEM SELECTOR PLAN 2
Anemia of chronic renal disease with hemoglobin 8.3 at present.  Will do EPO during HD treatment  OBtain iron studies for now.  Transfuse PRBC as per primary team prior to HD Anemia of chronic renal disease with hemoglobin 8.3 at present.  Not iron deficient. T sat% 22, Ferritin-[id number]   Will do EPO during HD treatment  Transfuse PRBC as per primary team prior to HD

## 2017-11-08 NOTE — PROGRESS NOTE ADULT - PROBLEM SELECTOR PLAN 3
Hyperkalemia likely due to dietary indiscretion vs HD related.  Low K renal diet.  Monitor K level post HD if remain elevated will check for recirculation during HD  Check URR to assess for clearance  Check Pre and post HD K level. Hyperkalemia likely due to dietary indiscretion vs HD related.  Low K renal diet.  CHeck URR today after HD. Post HD BUN level.   If remains with persistently hyperkalemia will check for recirculation.

## 2017-11-08 NOTE — PROGRESS NOTE ADULT - PROBLEM SELECTOR PLAN 4
Patient with Calcium level 7.5, phosphorus level 5.1 at present on Sensipar.  Obtain ionized calcium, Intact PTH, Vitamin D25 and Vitamin D1, 25 level.  Will give calcijex during HD treatment for now.  Low phosphorus diet.

## 2017-11-08 NOTE — PROGRESS NOTE ADULT - SUBJECTIVE AND OBJECTIVE BOX
Patient is a 62y Male seen and evaluated at bedside. Patient lying in bed in no acute distress at present. Denies any chest pain, shortness of breath at present. Interval placement of Right chest tube at present.     acetaminophen   Tablet. 650 every 6 hours PRN  amiodarone    Tablet 200 daily  aspirin enteric coated 81 daily  atorvastatin 40 at bedtime  calcitriol Injectable 2 once  epoetin thor Injectable 98520 daily  lidocaine   Patch 1 daily  metoclopramide Injectable 10 once PRN  midodrine 10 three times a day  morphine  - Injectable 1 once  ondansetron Injectable 4 once PRN  pantoprazole    Tablet 40 before breakfast  sodium chloride 0.9% lock flush 3 every 8 hours      Allergies    No Known Allergies    Intolerances        T(C): , Max: 36.8 (11-07-17 @ 10:00)  T(F): , Max: 98.3 (11-07-17 @ 10:00)  HR: 100 (11-08-17 @ 08:57)  BP: 84/45 (11-08-17 @ 08:57)  BP(mean): 62 (11-08-17 @ 08:57)  RR: 17 (11-08-17 @ 08:57)  SpO2: 97% (11-08-17 @ 08:57)  Wt(kg): --    11-07 @ 07:01  -  11-08 @ 07:00  --------------------------------------------------------  IN: 0 mL / OUT: 1400 mL / NET: -1400 mL    11-08 @ 07:01  -  11-08 @ 09:55  --------------------------------------------------------  IN: 0 mL / OUT: 80 mL / NET: -80 mL          Review of Systems:  CONSTITUTIONAL: No fever or chills,  EYES: No blurred or double vision.  RESPIRATORY: No shortness of breath, cough, hemoptysis  CARDIOVASCULAR: No Chest pain or shortness of breath  GASTROINTESTINAL: No abdominal or flank pain, No nausea or vomiting, No diarrhea  GENITOURINARY: No dysuria or urinary burning, No difficulty passing urine, No hematuria  NEUROLOGICAL: No headaches or blurred vision  SKIN: No skin rashes   MUSCULOSKELETAL: No arthralgia, Joint pain, leg edema, No muscle pains      PHYSICAL EXAM:  GENERAL: NAD, well-developed, well nourished, alert, awake, no acute distress at present  HEAD:  Atraumatic, Normocephalic,   EYES: Bilateral conjuctiva and scleral pallor+nt   Oral cavity: Oral mucosa dry and pale  NECK: Neck supple, No JVD  CHEST/LUNG: Bilateral decreased breath sounds, Bibasilar minimal rales and crepitation, right chest wall Chest tube present with drain.  HEART: Regular rate and rhythm. ELEONORA II/VI at LPSB, No gallop, no rub   ABDOMEN: Soft, Nontender, BS+nt, No flank tenderness.   EXTREMITIES: No clubbing, cyanosis, or edema  Neurology: AAOx3, no focal neurological deficit  SKIN: No rashes or lesions          ACCESS: LUE AV fistula with thrill+nt    LABS:                        8.3    4.8   )-----------( 77       ( 08 Nov 2017 06:54 )             28.3     11-08    141  |  97  |  41<H>  ----------------------------<  66<L>  6.1<H>   |  29  |  7.71<H>    Ca    7.5<L>      08 Nov 2017 06:54  Phos  5.1     11-06  Mg     2.0     11-08    TPro  6.2  /  Alb  3.5  /  TBili  1.1  /  DBili  x   /  AST  9<L>  /  ALT  <5<L>  /  AlkPhos  62  11-06      PT/INR - ( 08 Nov 2017 06:54 )   PT: 15.4 sec;   INR: 1.38          PTT - ( 08 Nov 2017 06:54 )  PTT:36.3 sec          RADIOLOGY & ADDITIONAL STUDIES:  < from: Xray Chest 1 View AP-PORTABLE IMMEDIATE (11.07.17 @ 18:29) >    ******PRELIMINARY REPORT******    ******PRELIMINARY REPORT******            EXAM:  XR CHEST 1 VIEW PORT IMMEDIATE                          PROCEDURE DATE:  11/07/2017               ******PRELIMINARY REPORT******    ******PRELIMINARY REPORT******          INTERPRETATION:  Portable AP Radiograph dated 11/7/2017 6:29 PM    CLINICAL INFORMATION: 62-year-old male with end-stage renal disease   status post chest tube placement.    PRIOR STUDIES: X-ray of the chest from 11/6/2017    FINDINGS:  Lines, Catheters & Support Devices: Interval placement of chest tube with   tip adjacent to the right costophrenic angle.    Heart Size, Mediastinum & Hilar Contours: Patient status post median   sternotomy and aortic valve replacement. Heart size is suboptimally   evaluated on this AP projection. There may be cardiomegaly. Normal   mediastinal and hilar contours.      Lungs: There is bilateral pulmonary venous congestion with fluid in the   right minor fissure. Interval improvement in right sided pleural   effusion. Residual small left pleural effusion. No pneumothorax.     Bones/Soft Tissues: No acute abnormalities of the soft tissues and   osseous structures.     IMPRESSION:  1. Chest tube in satisfactory position. No pneumothorax.   2. Stablepulmonary edema. Interval improvement in bilateral pleural   effusions. Residual small left pleural effusion.    Discussed with senior resident, Dr. Basilio.            "Thank you for the opportunity to participate in the care of this   patient."  ******PRELIMINARY REPORT******    ******PRELIMINARY REPORT******          AFRICA MASON M.D., RADIOLOGY RESIDENT                        < end of copied text >

## 2017-11-08 NOTE — PROGRESS NOTE ADULT - PROBLEM SELECTOR PLAN 1
ESRD on HD( MWF) through LUE AV fistula.  Last HD session on 11/06 at his HD center.  Will do HD treatment today as per his schedule.  Hyperkalemia: K:6.1, Chest xray with pleural effusion s/p right chest tube drain.  Low K/Renal/Low phos diet.  No ACEI/ARB for now.

## 2017-11-09 LAB
ANION GAP SERPL CALC-SCNC: 16 MMOL/L — SIGNIFICANT CHANGE UP (ref 5–17)
BUN SERPL-MCNC: 26 MG/DL — HIGH (ref 7–23)
CALCIUM SERPL-MCNC: 8 MG/DL — LOW (ref 8.4–10.5)
CEA SERPL-MCNC: 6.1 NG/ML — HIGH (ref 0–3.8)
CHLORIDE SERPL-SCNC: 98 MMOL/L — SIGNIFICANT CHANGE UP (ref 96–108)
CO2 SERPL-SCNC: 27 MMOL/L — SIGNIFICANT CHANGE UP (ref 22–31)
CREAT SERPL-MCNC: 5.7 MG/DL — HIGH (ref 0.5–1.3)
GLUCOSE SERPL-MCNC: 47 MG/DL — LOW (ref 70–99)
HCT VFR BLD CALC: 29.8 % — LOW (ref 39–50)
HGB BLD-MCNC: 9.1 G/DL — LOW (ref 13–17)
MAGNESIUM SERPL-MCNC: 2 MG/DL — SIGNIFICANT CHANGE UP (ref 1.6–2.6)
MCHC RBC-ENTMCNC: 29.1 PG — SIGNIFICANT CHANGE UP (ref 27–34)
MCHC RBC-ENTMCNC: 30.5 G/DL — LOW (ref 32–36)
MCV RBC AUTO: 95.2 FL — SIGNIFICANT CHANGE UP (ref 80–100)
PLATELET # BLD AUTO: 56 K/UL — LOW (ref 150–400)
POTASSIUM SERPL-MCNC: 5 MMOL/L — SIGNIFICANT CHANGE UP (ref 3.5–5.3)
POTASSIUM SERPL-SCNC: 5 MMOL/L — SIGNIFICANT CHANGE UP (ref 3.5–5.3)
PSA FREE FLD-MCNC: 0.1 NG/ML — SIGNIFICANT CHANGE UP
PSA FREE FLD-MCNC: 16.7 — SIGNIFICANT CHANGE UP
PSA FREE MFR FLD: 0.6 NG/ML — SIGNIFICANT CHANGE UP (ref 0–4)
RBC # BLD: 3.13 M/UL — LOW (ref 4.2–5.8)
RBC # FLD: 21.1 % — HIGH (ref 10.3–16.9)
SODIUM SERPL-SCNC: 141 MMOL/L — SIGNIFICANT CHANGE UP (ref 135–145)
WBC # BLD: 4 K/UL — SIGNIFICANT CHANGE UP (ref 3.8–10.5)
WBC # FLD AUTO: 4 K/UL — SIGNIFICANT CHANGE UP (ref 3.8–10.5)

## 2017-11-09 PROCEDURE — 71010: CPT | Mod: 26

## 2017-11-09 PROCEDURE — 93880 EXTRACRANIAL BILAT STUDY: CPT | Mod: 26

## 2017-11-09 RX ORDER — AMIODARONE HYDROCHLORIDE 400 MG/1
150 TABLET ORAL ONCE
Qty: 0 | Refills: 0 | Status: COMPLETED | OUTPATIENT
Start: 2017-11-09 | End: 2017-11-09

## 2017-11-09 RX ADMIN — SODIUM CHLORIDE 3 MILLILITER(S): 9 INJECTION INTRAMUSCULAR; INTRAVENOUS; SUBCUTANEOUS at 13:06

## 2017-11-09 RX ADMIN — OXYCODONE AND ACETAMINOPHEN 2 TABLET(S): 5; 325 TABLET ORAL at 22:30

## 2017-11-09 RX ADMIN — PANTOPRAZOLE SODIUM 40 MILLIGRAM(S): 20 TABLET, DELAYED RELEASE ORAL at 06:21

## 2017-11-09 RX ADMIN — MIDODRINE HYDROCHLORIDE 10 MILLIGRAM(S): 2.5 TABLET ORAL at 13:11

## 2017-11-09 RX ADMIN — ATORVASTATIN CALCIUM 40 MILLIGRAM(S): 80 TABLET, FILM COATED ORAL at 21:30

## 2017-11-09 RX ADMIN — MIDODRINE HYDROCHLORIDE 10 MILLIGRAM(S): 2.5 TABLET ORAL at 21:30

## 2017-11-09 RX ADMIN — Medication 81 MILLIGRAM(S): at 11:06

## 2017-11-09 RX ADMIN — SODIUM CHLORIDE 3 MILLILITER(S): 9 INJECTION INTRAMUSCULAR; INTRAVENOUS; SUBCUTANEOUS at 06:15

## 2017-11-09 RX ADMIN — ONDANSETRON 4 MILLIGRAM(S): 8 TABLET, FILM COATED ORAL at 19:30

## 2017-11-09 RX ADMIN — AMIODARONE HYDROCHLORIDE 200 MILLIGRAM(S): 400 TABLET ORAL at 06:21

## 2017-11-09 RX ADMIN — OXYCODONE AND ACETAMINOPHEN 2 TABLET(S): 5; 325 TABLET ORAL at 21:30

## 2017-11-09 RX ADMIN — AMIODARONE HYDROCHLORIDE 133.33 MILLIGRAM(S): 400 TABLET ORAL at 11:06

## 2017-11-09 RX ADMIN — SODIUM CHLORIDE 3 MILLILITER(S): 9 INJECTION INTRAMUSCULAR; INTRAVENOUS; SUBCUTANEOUS at 21:26

## 2017-11-09 RX ADMIN — MIDODRINE HYDROCHLORIDE 10 MILLIGRAM(S): 2.5 TABLET ORAL at 06:21

## 2017-11-09 NOTE — PROGRESS NOTE ADULT - PROBLEM SELECTOR PLAN 5
Severe MR with Right pleural effusion s/p Chest tube drain not a surgical candidate as per CT surgery team.   Possible removal of Chest tube tomorrow.   Optimal medical treatment

## 2017-11-09 NOTE — DIETITIAN INITIAL EVALUATION ADULT. - PHYSICAL APPEARANCE
underweight/emaciated/as per patient is 6ft tall not 5ft*8.UBW:373lbs 3 years ago.Has been losing weight ongoing./debilitated

## 2017-11-09 NOTE — DIETITIAN INITIAL EVALUATION ADULT. - OTHER INFO
63 y/o emaciated male admitted with CHF exacerbation.As per patient has lost about 175-200lbs over 3 years.Now with very poor po.Complaints of N/V/D and pain.Skin reported to be intact.Patient reported he should be on a regular diet due to "my anorexia".Patient is drinking 2 nepro(850kcal) but eating very little.

## 2017-11-09 NOTE — PROGRESS NOTE ADULT - ASSESSMENT
This is a 62y M with history of ESRD on HD via L AV fistula (M/W/F), CAD / AV disease s/p CABG/AVR in 2/2017 c/b refractory heart failure treated by aortic PVL in 6/2017.  He presented to OSH with worsening SOB and was admitted to [facility] on 11/6/17 under the care of Dr. Lisa for further evaluation of decompensated CHF with DARRIN revealing severe MR. He continued to be medically optimized and was noted to have incidental finding of pancreatic lesion on CT C/A/P on 10/17, for which tumor markers were obtained.  He has remained inpatient for further evaluation of possible procedural intervention for severe MR.       Neurovascular: No delirium, pain well controlled on current regimen.  -C/w PRNs for pain control    Respiratory: Saturating well on RA  -R pigtail placed on 1/8/17 for pleural effusion.  1500cc out initially, minimal drainage today.  Remain in on suction, probable removal tomorrow if no significant drainage overnight.   -CXR in AM  -Encourage IS 10x/hour while awake; C+DB; Ambulation  -Monitor SpO2 for respiratory status    Cardiovascular: Afib, HD stable; Hx of HTN, HLD, CAD s/p CABG; admitted for severe MR, EF 30%  -Chronic hypotension, c/w Midodrine 10mg Q8h  -C/w ASA 81mg, Lipitor 40mg for CAD  -Amiodarone 200mg QD for Afib; given 1x IVPB for HR low 100s this AM.  Continue to monitor.   -Monitor HR/BP/Tele    GI: Stable, tolerating PO; Pancreatic lesion on CT  - and CEA elevated at 38 and 6.1 respectively.  Per Dr. Lisa, low suspicion for malignancy.    -GI PPX: Protonix    /Renal: BUN/Cr: 26/5.7; ESRD on HD (MWF)  -HD tomorrow  -Trend AM Cr  -Monitor I/O    ID: Afebrile, Asymptomatic  -WCC 4.0, continue to trend  -No acute issues at this time, continue to monitor for SIRS    Endo: A1C: 4.2; TSH: 6.782  -No acute issues at this time    Heme: H/H Stable  -DVT PPX: SCDs; HSQ held 2/2 thrombocytopenia  -CBC, chem in AM    Dispo: Home when medically ready. This is a 62y M with history of ESRD on HD via L AV fistula (M/W/F), CAD / AV disease s/p CABG/AVR in 2/2017 c/b refractory heart failure treated by aortic PVL in 6/2017.  He presented to OSH with worsening SOB and was admitted to [facility] on 11/6/17 under the care of Dr. Lisa for further evaluation of decompensated CHF with DARRIN revealing severe MR. He continued to be medically optimized and was noted to have incidental finding of pancreatic lesion on CT C/A/P on 10/17, for which tumor markers were obtained.  He has remained inpatient for further evaluation of possible procedural intervention for severe MR.       Neurovascular: No delirium, pain well controlled on current regimen.  -C/w PRNs for pain control    Respiratory: Saturating well on RA  -R pigtail placed on 1/8/17 for pleural effusion.  1500cc out initially, minimal drainage today.  Remain in on suction, probable removal tomorrow if no significant drainage overnight.   -CXR in AM  -Encourage IS 10x/hour while awake; C+DB; Ambulation  -Monitor SpO2 for respiratory status    Cardiovascular: Afib, HD stable; Hx of HTN, HLD, CAD s/p CABG; admitted for severe MR, EF 30%  -Chronic hypotension, c/w Midodrine 10mg Q8h  -C/w ASA 81mg, Lipitor 40mg for CAD  -Amiodarone 200mg QD for Afib; given 1x IVPB for HR low 100s this AM.  Continue to monitor.   -Pt represents poor procedural candidate given general frailty and high morbidity/mortality 2/2 multiple comorbidities.  In consideration of these factors, he has been recommended by Dr. Lisa for medical management.  Heart failure team to be consulted.   -Monitor HR/BP/Tele    GI: Stable, tolerating PO; Pancreatic lesion on CT  - and CEA elevated at 38 and 6.1 respectively.  Per Dr. Lisa, low suspicion for malignancy.    -GI PPX: Protonix    /Renal: BUN/Cr: 26/5.7; ESRD on HD (MWF)  -HD tomorrow  -Trend AM Cr  -Monitor I/O    ID: Afebrile, Asymptomatic  -WCC 4.0, continue to trend  -No acute issues at this time, continue to monitor for SIRS    Endo: A1C: 4.2; TSH: 6.782  -No acute issues at this time    Heme: H/H Stable  -DVT PPX: SCDs; HSQ held 2/2 thrombocytopenia  -CBC, chem in AM    Dispo: Home when medically ready.

## 2017-11-09 NOTE — DIETITIAN INITIAL EVALUATION ADULT. - NS AS NUTRI DX NUTRIENT
Increased nutrient needs (specify)/severe malnutrition due to emaciated state/BMI and reported weight loss and limited po intake/Malnutrition

## 2017-11-09 NOTE — PROGRESS NOTE ADULT - SUBJECTIVE AND OBJECTIVE BOX
Patient is a 62y Male seen and evaluated at bedside. Patient lying in bed in no acute distress at present. Denies any chest pain, shortness of breath, palpitations at present.      acetaminophen   Tablet. 650 every 6 hours PRN  albumin human 25% IVPB 50 every 1 hour PRN  albumin human 25% IVPB 50 every 1 hour PRN  albumin human 25% IVPB 50 every 1 hour PRN  amiodarone    Tablet 200 daily  aspirin enteric coated 81 daily  atorvastatin 40 at bedtime  epoetin thor Injectable 55532 once  lidocaine   Patch 1 daily  midodrine 10 three times a day  morphine  - Injectable 1 once  ondansetron Injectable 4 once PRN  oxyCODONE    5 mG/acetaminophen 325 mG 2 every 6 hours PRN  pantoprazole    Tablet 40 before breakfast  sodium chloride 0.9% lock flush 3 every 8 hours      Allergies    No Known Allergies    Intolerances        T(C): , Max: 37.2 (11-08-17 @ 18:08)  T(F): , Max: 98.9 (11-08-17 @ 18:08)  HR: 112 (11-09-17 @ 11:12)  BP: 113/69 (11-09-17 @ 11:12)  BP(mean): 95 (11-09-17 @ 11:12)  RR: 16 (11-09-17 @ 11:12)  SpO2: 96% (11-09-17 @ 11:12)  Wt(kg): --    11-08 @ 07:01  -  11-09 @ 07:00  --------------------------------------------------------  IN: 360 mL / OUT: 150 mL / NET: 210 mL    11-09 @ 07:01  -  11-09 @ 11:45  --------------------------------------------------------  IN: 170 mL / OUT: 20 mL / NET: 150 mL          Review of Systems:  CONSTITUTIONAL: No fever or chills  EYES: No blurred or double vision.  RESPIRATORY: No shortness of breath, cough, hemoptysis  CARDIOVASCULAR: No Chest pain or shortness of breath  GASTROINTESTINAL: NO abdominal or flank pain, No nausea or vomiting, No diarrhea  GENITOURINARY: No dysuria or urinary burning, No difficulty passing urine, No hematuria  NEUROLOGICAL: No headaches or blurred vision  SKIN: No skin rashes   MUSCULOSKELETAL: No arthralgia, Joint pain, leg edema, No muscle pains      PHYSICAL EXAM:  GENERAL: NAD, well-developed, well nourished, alert, awake, no acute distress at present  HEAD:  Atraumatic, Normocephalic,   EYES: Bilateral conjuctiva and sclera normal   Oral cavity: Oral mucosa dry and pale  NECK: Neck supple, No JVD  CHEST/LUNG: Bilateral decreased breath sounds, Bibasilar minimal rales and crepitations, Right>left, Right chest tube+nt  HEART: Regular rate and rhythm. ELEONORA II/VI at LPSB, No gallop, no rub   ABDOMEN: Soft, Nontender, BS+nt, No flank tenderness.   EXTREMITIES: No clubbing, cyanosis, or edema  Neurology: AAOx3, no focal neurological deficit  SKIN: No rashes or lesions          ACCESS: LUE AV fistula+nt with thrill.    LABS:                        9.1    4.0   )-----------( 56       ( 09 Nov 2017 06:54 )             29.8     11-09    141  |  98  |  26<H>  ----------------------------<  47<L>  5.0   |  27  |  5.70<H>    Ca    8.0<L>      09 Nov 2017 06:54  Mg     2.0     11-09        PT/INR - ( 08 Nov 2017 06:54 )   PT: 15.4 sec;   INR: 1.38          PTT - ( 08 Nov 2017 06:54 )  PTT:36.3 sec          RADIOLOGY & ADDITIONAL STUDIES:  < from: Xray Chest 1 View AP -PORTABLE-Routine (11.09.17 @ 05:56) >    EXAM:  XR CHEST 1 VIEW PORT ROUTINE                          PROCEDURE DATE:  11/09/2017                     INTERPRETATION:    PORTABLE CHEST X-RAY    Indication: pre-op    Bedside examination of the chest dated 11/9/2017 5:56 AM is compared to   the previous study of 11/8/2017.    Findings: Again status post valve replacement surgery. Right-sided chest   tube remains in place. No pneumothorax. There is again small, partially   loculated right pleural effusion. No change small left pleural effusion   and atelectasis left base. Mild congestion.    Impression: No significant change.            "Thank you for the opportunity to participate in the care of this   patient."        NIDA SURESH M.D., ATTENDING RADIOLOGIST  This document has been electronically signed. Nov 9 2017  8:35AM                  < end of copied text >

## 2017-11-09 NOTE — DIETITIAN INITIAL EVALUATION ADULT. - ENERGY NEEDS
BMI:17.43,IBW:178lbs+/-10%,72% of IBW.Increased kcal/protein and nutrient needs due to BMI/HD and n/v/d losses.Physical assessment noted temporal wasting/Loss of fat pads on arms /legs/orbital region/prominent clavical bone/ribs/shoulder.

## 2017-11-09 NOTE — PROGRESS NOTE ADULT - PROBLEM SELECTOR PLAN 4
Patient with Calcium level 7.5, phosphorus level 5.1 at present on Sensipar.  Obtain ionized calcium, Intact PTH, Vitamin D25 and Vitamin D1, 25 level.  Will give calcijex during HD treatment next visit.  Low phosphorus diet.

## 2017-11-09 NOTE — DIETITIAN INITIAL EVALUATION ADULT. - NS AS NUTRI INTERV MEALS SNACK
General/healthful diet/would advance diet to accommodate patients preferences and tolerance/Energy - modified diet/Protein - modified diet

## 2017-11-09 NOTE — PROGRESS NOTE ADULT - PROBLEM SELECTOR PLAN 2
Anemia of chronic renal disease with hemoglobin 8.3 at present.  Not iron deficient. T sat% 22, Ferritin-[id number]   Will do EPO during next HD treatment  Transfuse PRBC as per primary team prior to HD

## 2017-11-09 NOTE — CHART NOTE - NSCHARTNOTEFT_GEN_A_CORE
Upon Nutritional Assessment by the Registered Dietitian your patient was determined to meet criteria / has evidence of the following diagnosis/diagnoses:          [ ]  Mild Protein Calorie Malnutrition        [ ]  Moderate Protein Calorie Malnutrition        [x ] Severe Protein Calorie Malnutrition        [ ] Unspecified Protein Calorie Malnutrition        [x ] Underweight / BMI <19        [ ] Morbid Obesity / BMI > 40      Findings as based on:  •  Comprehensive nutrition assessment and consultation  •  Calorie counts (nutrient intake analysis)  •  Food acceptance and intake status from observations by staff  •  Follow up  •  Patient education  •  Intervention secondary to interdisciplinary rounds  •   concerns      Treatment:    The following diet has been recommended: change to low sodium/fluid restriction with 2 nepro    PROVIDER Section:     By signing this assessment you are acknowledging and agree with the diagnosis/diagnoses assigned by the Registered Dietitian    Comments:

## 2017-11-09 NOTE — PROGRESS NOTE ADULT - SUBJECTIVE AND OBJECTIVE BOX
Patient discussed on morning rounds with Dr. Lisa     Operation / Date: Admitted for Severe MR on 11/6/17    SUBJECTIVE ASSESSMENT:  62y Male seen at bedside this AM.  He states that he is frustrated somewhat that his procedure was cancelled but is otherwise without complaints.  Denies HA, AMS, CP, palpitations, SOB at rest, n/v/d, fever.      Vital Signs Last 24 Hrs  T(C): 37.7 (09 Nov 2017 13:37), Max: 37.7 (09 Nov 2017 13:37)  T(F): 99.8 (09 Nov 2017 13:37), Max: 99.8 (09 Nov 2017 13:37)  HR: 112 (09 Nov 2017 11:12) (96 - 112)  BP: 113/69 (09 Nov 2017 11:12) (79/47 - 113/69)  BP(mean): 95 (09 Nov 2017 11:12) (58 - 95)  RR: 16 (09 Nov 2017 11:12) (16 - 18)  SpO2: 96% (09 Nov 2017 11:12) (95% - 97%)  I&O's Detail    08 Nov 2017 07:01  -  09 Nov 2017 07:00  --------------------------------------------------------  IN:    Oral Fluid: 360 mL  Total IN: 360 mL    OUT:    Chest Tube: 150 mL  Total OUT: 150 mL    Total NET: 210 mL      09 Nov 2017 07:01  -  09 Nov 2017 14:55  --------------------------------------------------------  IN:    Oral Fluid: 170 mL  Total IN: 170 mL    OUT:    Chest Tube: 20 mL  Total OUT: 20 mL    Total NET: 150 mL    CHEST TUBE:  Yes- Pigtail evident on R lateral chest wall.   MARK DRAIN: No.  EPICARDIAL WIRES: No.  TIE DOWNS: No.  SANCHEZ: No.    PHYSICAL EXAM:    General: Resting comfortably in bed.  Appears cachectic but in no acute distress.     Neurological: AAOx3, no AMS or focal deficits.     Cardiovascular: Irregularly irregular, S1/S2    Respiratory:    Gastrointestinal:    Extremities:    Vascular:    Incision Sites:    LABS:                        9.1    4.0   )-----------( 56       ( 09 Nov 2017 06:54 )             29.8       COUMADIN:  Yes/No. REASON: .    PT/INR - ( 08 Nov 2017 06:54 )   PT: 15.4 sec;   INR: 1.38          PTT - ( 08 Nov 2017 06:54 )  PTT:36.3 sec    11-09    141  |  98  |  26<H>  ----------------------------<  47<L>  5.0   |  27  |  5.70<H>    Ca    8.0<L>      09 Nov 2017 06:54  Mg     2.0     11-09            MEDICATIONS  (STANDING):  amiodarone    Tablet 200 milliGRAM(s) Oral daily  aspirin enteric coated 81 milliGRAM(s) Oral daily  atorvastatin 40 milliGRAM(s) Oral at bedtime  epoetin thor Injectable 79554 Unit(s) IV Push once  lidocaine   Patch 1 Patch Transdermal daily  midodrine 10 milliGRAM(s) Oral three times a day  morphine  - Injectable 1 milliGRAM(s) IV Push once  pantoprazole    Tablet 40 milliGRAM(s) Oral before breakfast  sodium chloride 0.9% lock flush 3 milliLiter(s) IV Push every 8 hours    MEDICATIONS  (PRN):  acetaminophen   Tablet. 650 milliGRAM(s) Oral every 6 hours PRN Moderate Pain (4 - 6)  albumin human 25% IVPB 50 milliLiter(s) IV Intermittent every 1 hour PRN hypotension  albumin human 25% IVPB 50 milliLiter(s) IV Intermittent every 1 hour PRN hypotension  albumin human 25% IVPB 50 milliLiter(s) IV Intermittent every 1 hour PRN hypotension  ondansetron Injectable 4 milliGRAM(s) IV Push once PRN Nausea and/or Vomiting  oxyCODONE    5 mG/acetaminophen 325 mG 2 Tablet(s) Oral every 6 hours PRN Severe Pain (7 - 10)        RADIOLOGY & ADDITIONAL TESTS: Patient discussed on morning rounds with Dr. Lisa     Operation / Date: Admitted for Severe MR on 11/6/17    SUBJECTIVE ASSESSMENT:  62y Male seen at bedside this AM.  He states that he is frustrated somewhat that his procedure was cancelled but is otherwise without complaints.  Denies HA, AMS, CP, palpitations, SOB at rest, n/v/d, fever.      Vital Signs Last 24 Hrs  T(C): 37.7 (09 Nov 2017 13:37), Max: 37.7 (09 Nov 2017 13:37)  T(F): 99.8 (09 Nov 2017 13:37), Max: 99.8 (09 Nov 2017 13:37)  HR: 112 (09 Nov 2017 11:12) (96 - 112)  BP: 113/69 (09 Nov 2017 11:12) (79/47 - 113/69)  BP(mean): 95 (09 Nov 2017 11:12) (58 - 95)  RR: 16 (09 Nov 2017 11:12) (16 - 18)  SpO2: 96% (09 Nov 2017 11:12) (95% - 97%)  I&O's Detail    08 Nov 2017 07:01  -  09 Nov 2017 07:00  --------------------------------------------------------  IN:    Oral Fluid: 360 mL  Total IN: 360 mL    OUT:    Chest Tube: 150 mL  Total OUT: 150 mL    Total NET: 210 mL      09 Nov 2017 07:01  -  09 Nov 2017 14:55  --------------------------------------------------------  IN:    Oral Fluid: 170 mL  Total IN: 170 mL    OUT:    Chest Tube: 20 mL  Total OUT: 20 mL    Total NET: 150 mL    CHEST TUBE:  Yes- Pigtail evident on R lateral chest wall.   MARK DRAIN: No.  EPICARDIAL WIRES: No.  TIE DOWNS: No.  SANCHEZ: No.    PHYSICAL EXAM:    General: Resting comfortably in bed.  Appears cachectic but in no acute distress.     Neurological: AAOx3, no AMS or focal deficits.     Cardiovascular: Irregularly irregular, S1/S2, +Murmur at apex, no r/g.     Respiratory: No acute distress on RA.  CTA b/l, no w/r/r.     Gastrointestinal: ND, NBS, non-TTP.    Extremities: Warm and well perfused.  Noted multiple areas of small excoriations on anterior aspect of b/l LE and UE, no active bleeding or purulence appreciated.  No edema or calf ttp b/l.     Vascular: Pulses 2+    Incision Sites: N/A    LABS:                        9.1    4.0   )-----------( 56       ( 09 Nov 2017 06:54 )             29.8       COUMADIN: No.    PT/INR - ( 08 Nov 2017 06:54 )   PT: 15.4 sec;   INR: 1.38          PTT - ( 08 Nov 2017 06:54 )  PTT:36.3 sec    11-09    141  |  98  |  26<H>  ----------------------------<  47<L>  5.0   |  27  |  5.70<H>    Ca    8.0<L>      09 Nov 2017 06:54  Mg     2.0     11-09      MEDICATIONS  (STANDING):  amiodarone    Tablet 200 milliGRAM(s) Oral daily  aspirin enteric coated 81 milliGRAM(s) Oral daily  atorvastatin 40 milliGRAM(s) Oral at bedtime  epoetin thor Injectable 63185 Unit(s) IV Push once  lidocaine   Patch 1 Patch Transdermal daily  midodrine 10 milliGRAM(s) Oral three times a day  morphine  - Injectable 1 milliGRAM(s) IV Push once  pantoprazole    Tablet 40 milliGRAM(s) Oral before breakfast  sodium chloride 0.9% lock flush 3 milliLiter(s) IV Push every 8 hours    MEDICATIONS  (PRN):  acetaminophen   Tablet. 650 milliGRAM(s) Oral every 6 hours PRN Moderate Pain (4 - 6)  albumin human 25% IVPB 50 milliLiter(s) IV Intermittent every 1 hour PRN hypotension  albumin human 25% IVPB 50 milliLiter(s) IV Intermittent every 1 hour PRN hypotension  albumin human 25% IVPB 50 milliLiter(s) IV Intermittent every 1 hour PRN hypotension  ondansetron Injectable 4 milliGRAM(s) IV Push once PRN Nausea and/or Vomiting  oxyCODONE    5 mG/acetaminophen 325 mG 2 Tablet(s) Oral every 6 hours PRN Severe Pain (7 - 10)      RADIOLOGY & ADDITIONAL TESTS:  < from: Xray Chest 1 View AP -PORTABLE-Routine (11.09.17 @ 05:56) >    EXAM:  XR CHEST 1 VIEW PORT ROUTINE                          PROCEDURE DATE:  11/09/2017           INTERPRETATION:    PORTABLE CHEST X-RAY    Indication: pre-op    Bedside examination of the chest dated 11/9/2017 5:56 AM is compared to   the previous study of 11/8/2017.    Findings: Again status post valve replacement surgery. Right-sided chest   tube remains in place. No pneumothorax. There is again small, partially   loculated right pleural effusion. No change small left pleural effusion   and atelectasis left base. Mild congestion.    Impression: No significant change.      < end of copied text >

## 2017-11-09 NOTE — PROGRESS NOTE ADULT - ASSESSMENT
Patient is a 62 year old male with history of ESRD on HD last HD today via L AV fistula (M/W/F), CAD / AV disease s/p CABG and AVR in 02/2017 c/b refractory heart failure s/p aortic PVL at [facility] in June 2017 being admitted today (Transferred from OSH.) Nephrology consult called for ESRD on HD treatment. Patient with interval placement of Right chest tube for pleural effusion. Patient denies any chest pain, palpitations, dizziness at present. Discuss with CT surgery PA as per no plan for surgery for now.

## 2017-11-09 NOTE — DIETITIAN INITIAL EVALUATION ADULT. - NUTRITION INTERVENTIONS
advance diet to meet nutritional needs/nutrient dense snacks/food preferences as requested by patient (specify)/other

## 2017-11-09 NOTE — PROGRESS NOTE ADULT - PROBLEM SELECTOR PLAN 1
ESRD on HD( MWF) through LUE AV fistula.  Last HD session on 11/08 at his HD center.  Will do HD treatment tomorrow. Will check recirculation circuit check with next HD treatment.   URR: 55 %. K level 5.1   Low K/Renal/Low phos diet.  No ACEI/ARB for now.

## 2017-11-10 ENCOUNTER — TRANSCRIPTION ENCOUNTER (OUTPATIENT)
Age: 62
End: 2017-11-10

## 2017-11-10 VITALS
OXYGEN SATURATION: 94 % | HEART RATE: 110 BPM | SYSTOLIC BLOOD PRESSURE: 115 MMHG | DIASTOLIC BLOOD PRESSURE: 57 MMHG | RESPIRATION RATE: 18 BRPM

## 2017-11-10 LAB
ANION GAP SERPL CALC-SCNC: 18 MMOL/L — HIGH (ref 5–17)
BUN SERPL-MCNC: 35 MG/DL — HIGH (ref 7–23)
CALCIUM SERPL-MCNC: 8.2 MG/DL — LOW (ref 8.4–10.5)
CHLORIDE SERPL-SCNC: 92 MMOL/L — LOW (ref 96–108)
CO2 SERPL-SCNC: 24 MMOL/L — SIGNIFICANT CHANGE UP (ref 22–31)
CREAT SERPL-MCNC: 7.11 MG/DL — HIGH (ref 0.5–1.3)
GLUCOSE SERPL-MCNC: 84 MG/DL — SIGNIFICANT CHANGE UP (ref 70–99)
HCT VFR BLD CALC: 28.5 % — LOW (ref 39–50)
HGB BLD-MCNC: 8.8 G/DL — LOW (ref 13–17)
MAGNESIUM SERPL-MCNC: 2 MG/DL — SIGNIFICANT CHANGE UP (ref 1.6–2.6)
MCHC RBC-ENTMCNC: 28.9 PG — SIGNIFICANT CHANGE UP (ref 27–34)
MCHC RBC-ENTMCNC: 30.9 G/DL — LOW (ref 32–36)
MCV RBC AUTO: 93.8 FL — SIGNIFICANT CHANGE UP (ref 80–100)
PLATELET # BLD AUTO: 65 K/UL — LOW (ref 150–400)
POTASSIUM SERPL-MCNC: 4.9 MMOL/L — SIGNIFICANT CHANGE UP (ref 3.5–5.3)
POTASSIUM SERPL-SCNC: 4.9 MMOL/L — SIGNIFICANT CHANGE UP (ref 3.5–5.3)
PREALB SERPL-MCNC: 9 MG/DL — LOW (ref 20–40)
RBC # BLD: 3.04 M/UL — LOW (ref 4.2–5.8)
RBC # FLD: 20.1 % — HIGH (ref 10.3–16.9)
SODIUM SERPL-SCNC: 134 MMOL/L — LOW (ref 135–145)
WBC # BLD: 4.1 K/UL — SIGNIFICANT CHANGE UP (ref 3.8–10.5)
WBC # FLD AUTO: 4.1 K/UL — SIGNIFICANT CHANGE UP (ref 3.8–10.5)

## 2017-11-10 PROCEDURE — 71010: CPT | Mod: 26,76

## 2017-11-10 RX ORDER — ALPRAZOLAM 0.25 MG
0.25 TABLET ORAL ONCE
Qty: 0 | Refills: 0 | Status: DISCONTINUED | OUTPATIENT
Start: 2017-11-10 | End: 2017-11-10

## 2017-11-10 RX ORDER — ERYTHROPOIETIN 10000 [IU]/ML
10000 INJECTION, SOLUTION INTRAVENOUS; SUBCUTANEOUS ONCE
Qty: 0 | Refills: 0 | Status: COMPLETED | OUTPATIENT
Start: 2017-11-10 | End: 2017-11-10

## 2017-11-10 RX ORDER — ACETAMINOPHEN 500 MG
1 TABLET ORAL
Qty: 120 | Refills: 0 | OUTPATIENT
Start: 2017-11-10 | End: 2017-12-10

## 2017-11-10 RX ORDER — CALCITRIOL 0.5 UG/1
2 CAPSULE ORAL ONCE
Qty: 0 | Refills: 0 | Status: COMPLETED | OUTPATIENT
Start: 2017-11-10 | End: 2017-11-10

## 2017-11-10 RX ADMIN — Medication 50 MILLILITER(S): at 10:50

## 2017-11-10 RX ADMIN — CALCITRIOL 2 MICROGRAM(S): 0.5 CAPSULE ORAL at 11:15

## 2017-11-10 RX ADMIN — Medication 0.25 MILLIGRAM(S): at 04:14

## 2017-11-10 RX ADMIN — Medication 50 MILLILITER(S): at 12:50

## 2017-11-10 RX ADMIN — PANTOPRAZOLE SODIUM 40 MILLIGRAM(S): 20 TABLET, DELAYED RELEASE ORAL at 06:58

## 2017-11-10 RX ADMIN — SODIUM CHLORIDE 3 MILLILITER(S): 9 INJECTION INTRAMUSCULAR; INTRAVENOUS; SUBCUTANEOUS at 15:13

## 2017-11-10 RX ADMIN — SODIUM CHLORIDE 3 MILLILITER(S): 9 INJECTION INTRAMUSCULAR; INTRAVENOUS; SUBCUTANEOUS at 05:53

## 2017-11-10 RX ADMIN — ERYTHROPOIETIN 10000 UNIT(S): 10000 INJECTION, SOLUTION INTRAVENOUS; SUBCUTANEOUS at 11:14

## 2017-11-10 RX ADMIN — MIDODRINE HYDROCHLORIDE 10 MILLIGRAM(S): 2.5 TABLET ORAL at 06:59

## 2017-11-10 RX ADMIN — MIDODRINE HYDROCHLORIDE 10 MILLIGRAM(S): 2.5 TABLET ORAL at 15:13

## 2017-11-10 RX ADMIN — Medication 50 MILLILITER(S): at 12:00

## 2017-11-10 RX ADMIN — AMIODARONE HYDROCHLORIDE 200 MILLIGRAM(S): 400 TABLET ORAL at 06:58

## 2017-11-10 RX ADMIN — Medication 81 MILLIGRAM(S): at 15:13

## 2017-11-10 NOTE — DISCHARGE NOTE ADULT - CARE PLAN
Principal Discharge DX:	Mitral valve disease  Goal:	Recover from hospital stay  Instructions for follow-up, activity and diet:	-Please follow up with Dr. Lisa on 11/22/17 at 1:30pm. The office is located at [address] [place] [address], [place] [address]; [place] [address].  Phone Number: #[phone]    -Walk daily as tolerated and use your incentive spirometer every hour.    -No driving or strenuous activity/exercise for 6 weeks, or until  cleared by your surgeon.    -Gently clean your incisions with anti-bacterial soap and water, pat  dry.  You may leave them open to air.    -Call your doctor if you have shortness of breath, chest pain not  relieved by pain medication, dizziness, fever >101.5, or increased  redness or drainage from incisions.  Goal:	Additional follow-up appointments  Instructions for follow-up, activity and diet:	-You have been referred to Dr. Waqas Gibbons for further evaluation of your heart failure and treatment.  Her office will contact you within the coming days to schedule an outpatient appointment with you.  If you have not heard from her office by 11/14/17, please contact her office at the number listed in your discharge paperwork. Principal Discharge DX:	Mitral valve disease  Goal:	Recover from hospital stay  Instructions for follow-up, activity and diet:	-Please follow up with Dr. Lisa on 11/22/17 at 1:30pm. The office is located at [address] [place] [address], [facility] [address]; [place] [address].  Phone Number: #[phone]    -Walk daily as tolerated and use your incentive spirometer every hour.    -No driving or strenuous activity/exercise for 6 weeks, or until  cleared by your surgeon.    -Gently clean your incisions with anti-bacterial soap and water, pat  dry.  You may leave them open to air.    -Call your doctor if you have shortness of breath, chest pain not  relieved by pain medication, dizziness, fever >101.5, or increased  redness or drainage from incisions.  Goal:	Additional follow-up appointments  Instructions for follow-up, activity and diet:	-You have been referred to Dr. Waqas Gibbons for further evaluation of your heart failure and treatment.  Her office will contact you within the coming days to schedule an outpatient appointment with you.  If you have not heard from her office by 11/14/17, please contact her office at the number listed in your discharge paperwork.    -As discussed with the Electrophysiology team, Dr. Angel, you should follow-up with your cardiologist, Dr. Jacobson in [place] for further care of your Atrial fibrillation.  Please call his office on 11/13/17 to schedule an appointment within 2-4 weeks.

## 2017-11-10 NOTE — PROGRESS NOTE ADULT - ASSESSMENT
-Please follow up with Dr. Lisa on 11/22/17 at 1:30pm. The office is located at [address] [place] [address], [facility] [address]; [place] [address].  Phone Number: #[phone]     -You have been referred to Dr. Waqas Gibbons for further evaluation of your heart failure and treatment.  Her office will contact you within the coming days to schedule an outpatient appointment with you.  If you have not heard from her office by 11/14/17, please contact her office at the number listed in your discharge paperwork.    -As discussed with the Electrophysiology team, Dr. Angel, you should follow-up with your cardiologist, Dr. Jacobson in [place] for further care of your Atrial fibrillation.  Please call his office on 11/13/17 to schedule an appointment within 2-4 weeks. -You have been referred to Dr. Waqas Gibbons for further evaluation of your heart failure and treatment.  Her office will contact you within the coming days to schedule an outpatient appointment with you.  If you have not heard from her office by 11/14/17, please contact her office at the number listed in your discharge paperwork.

## 2017-11-10 NOTE — CHART NOTE - NSCHARTNOTEFT_GEN_A_CORE
CT Removal:    Pt seen and examined at bedside.  Case discussed with Dr. Lisa    Minimal output from CT.  No air leak appreciated.  CT removed without incident per Dr. Lisa request.  Occlusive DSD placed.  CXR no obvious PTX noted.  Pt tolerated procedure well.

## 2017-11-10 NOTE — PROGRESS NOTE ADULT - SUBJECTIVE AND OBJECTIVE BOX
Patient was seen and evaluated on dialysis.   Patient is tolerating the procedure well.   HR: 89 (11-10-17 @ 10:45)  BP: 98/58 (11-10-17 @ 10:45)  Continue dialysis:   Dialyzer:  Optiflux 180        QB: 400       QD: 500 2k+  Goal UF 1 to 1.5 L over  3 Hours   EPO and Calcijex during HD

## 2017-11-10 NOTE — PROGRESS NOTE ADULT - SUBJECTIVE AND OBJECTIVE BOX
Patient discussed on morning rounds with Dr. Lisa     Operation / Date: None; admitted for MV disease    Surgeon: Dr. Lisa     SUBJECTIVE ASSESSMENT:  62y Male seen at bedside this AM.  He states that he feels ready to go home and denies acute overnight events, HA, AMS, CP, palpitations, SOB, n/v/d, fever.     Hospital Course:  This is a 62y M with history of ESRD on HD via L AV fistula (M/W/F), CAD / AV disease s/p CABG/AVR in 2/2017 c/b refractory heart failure treated by aortic PVL in 6/2017.  He presented to OSH with worsening SOB and was admitted to [facility] on 11/6/17 under the care of Dr. Lisa for further evaluation of decompensated CHF with DARRIN revealing severe MR.  Admission CXR revealed R pleural effusion which was confirmed with bedside ultrasound and R pigtail catheter was placed at bedside, initially draining 1500cc with minimal drainage after. He continued to be medically optimized and was noted to have incidental finding of pancreatic lesion on CT C/A/P on 10/17, for which tumor markers were obtained and reviewed by Dr. Lisa and GI team, no indication for intervention this admission.  After review of imaging and pre-procedural labwork and in consideration of the patient's multiple comorbidities with associated high risk of morbidity/mortality with procedural intervention, he was deemed a non-procedural candidate and was transitioned to medical management for care.  Dr. Waqas Gibbons, Heart Failure team, was consulted and she will see the patient in outpatient follow-up, which was approved by Dr. Lisa.  On 11/10/17, he is progressing well and endorses his desire to go home today.  Chest tube evaluated at bedside, minimal drainage and no air leak, removed per Dr. Lisa.  He received HD in AM and was medically cleared for discharge to home afterward.  Medications and instructions were confirmed and discussed with patient prior to discharge.      Vital Signs Last 24 Hrs  T(C): 36.7 (10 Nov 2017 10:45), Max: 37.8 (09 Nov 2017 17:43)  T(F): 98.1 (10 Nov 2017 10:45), Max: 100 (09 Nov 2017 17:43)  HR: 89 (10 Nov 2017 10:45) (89 - 110)  BP: 98/58 (10 Nov 2017 10:45) (90/55 - 107/63)  BP(mean): 81 (10 Nov 2017 08:28) (68 - 82)  RR: 19 (10 Nov 2017 10:45) (16 - 19)  SpO2: 100% (10 Nov 2017 10:45) (95% - 100%)  I&O's Detail    09 Nov 2017 07:01  -  10 Nov 2017 07:00  --------------------------------------------------------  IN:    IV PiggyBack: 150 mL    Oral Fluid: 290 mL  Total IN: 440 mL    OUT:    Chest Tube: 20 mL    Voided: 25 mL  Total OUT: 45 mL    Total NET: 395 mL      10 Nov 2017 07:01  -  10 Nov 2017 13:02  --------------------------------------------------------  IN:    Oral Fluid: 100 mL  Total IN: 100 mL    OUT:  Total OUT: 0 mL    Total NET: 100 mL      EPICARDIAL WIRES REMOVED: NA  TIE DOWNS REMOVED: NA.    PHYSICAL EXAM:    General: Resting comfortably in bed.  Appears cachectic but in no acute distress.     Neurological: AAOx3, no AMS or focal deficits.     Cardiovascular: Irregularly irregular, S1/S2, +Murmur at apex, no r/g.     Respiratory: No acute distress on RA.  CTA b/l, no w/r/r.     Gastrointestinal: ND, NBS, non-TTP.    Extremities: Warm and well perfused.  Noted multiple areas of small excoriations on anterior aspect of b/l LE and UE, no active bleeding or purulence appreciated.  No edema or calf ttp b/l.     Vascular: Pulses 2+    Incision Sites: N/A    LABS:                        8.8    4.1   )-----------( 65       ( 10 Nov 2017 11:01 )             28.5       COUMADIN: No.        11-10    134<L>  |  92<L>  |  35<H>  ----------------------------<  84  4.9   |  24  |  7.11<H>    Ca    8.2<L>      10 Nov 2017 11:01  Mg     2.0     11-10      MEDICATIONS  (STANDING):  I will START or STAY ON the medications listed below when I get home from the hospital:    aspirin 81 mg oral tablet  -- 1 tab(s) by mouth once a day  -- Indication: For Blood thinner    acetaminophen 325 mg oral tablet  -- 1 tab(s) by mouth every 6 hours, As Needed -Moderate Pain (4 - 6) MDD:4  -- Indication: For Pain    amiodarone 200 mg oral tablet  -- 1 tab(s) by mouth once a day  -- Indication: For Heart rate control    Lipitor 40 mg oral tablet  -- 1 tab(s) by mouth once a day  -- Indication: For Cholesterol    midodrine 10 mg oral tablet  -- 1 tab(s) by mouth 3 times a day  -- Indication: For Blood pressure.     I will STOP taking the medications listed below when I get home from the hospital:  None.     I will SWITCH the dose or number of times a day I take the medications listed below when I get home from the hospital:  None.    Discharge CXR:  < from: Xray Chest 1 View AP -PORTABLE-Routine (11.09.17 @ 05:56) >    EXAM:  XR CHEST 1 VIEW PORT ROUTINE                          PROCEDURE DATE:  11/09/2017         INTERPRETATION:    PORTABLE CHEST X-RAY    Indication: pre-op    Bedside examination of the chest dated 11/9/2017 5:56 AM is compared to   the previous study of 11/8/2017.    Findings: Again status post valve replacement surgery. Right-sided chest   tube remains in place. No pneumothorax. There is again small, partially   loculated right pleural effusion. No change small left pleural effusion   and atelectasis left base. Mild congestion.    Impression: No significant change.    < end of copied text >      Discharge ECHO:  < from: DARRIN w/Doppler (11.07.17 @ 17:10) >  Interpretation Summary  Patient was tachycardic during the study. Severe segmental left   ventricular   dysfunction with akinesis of inferoseptum.The left ventricular ejection   fraction is severely reduced.The left ventricular ejection fraction is   30%.The   right ventricle is dilated.The right ventricular systolic function is   moderately reduced.The left atrium is severely dilated.No clot seen in   the   left atrium or in the left atrial appendage.No evidence for interatrial   shunt   by color Doppler assessment.The right atrium is dilated.There is a   bioprosthetic aortic valve.Mild to moderate paravalvular aortic   regurgitation.The peak pressure gradient is 10 mmHg.The mean pressure   gradient   is 5 mmHg.Mitral annular calcification noted.The posterior leaflet of the   mitral valve appears restricted. Multiplane 3D reconstruction reveals a   MV   area of 6 sqcm at leaflet tips.  There is severe mitral regurgitation.The   effective regurgitant orifice, calculated by the PISA method, is 0.6   cm2The   regurgitant volume, based on the PISA calculation, is 75 ml.Structurally   normal tricuspid valve.There is mild tricuspid regurgitation.There was   insufficient TR detected from which to calculate pulmonary artery   systolic   pressure.  Structurally normal pulmonic valve.There is trace pulmonic   regurgitation.There is no pericardial effusion.Bilateral pleural effusion   noted.Severe, non-mobile plaque is seen in the visualized portions of the   aorta and aortic arch.    < end of copied text > Patient discussed on morning rounds with Dr. Lisa     Operation / Date: None; admitted for MV disease    Surgeon: Dr. Lisa     SUBJECTIVE ASSESSMENT:  62y Male seen at bedside this AM.  He states that he feels ready to go home and denies acute overnight events, HA, AMS, CP, palpitations, SOB, n/v/d, fever.     Hospital Course:  This is a 62y M with history of ESRD on HD via L AV fistula (M/W/F), CAD / AV disease s/p CABG/AVR in 2/2017 c/b refractory heart failure treated by aortic PVL in 6/2017.  He presented to OSH with worsening SOB and was admitted to [facility] on 11/6/17 under the care of Dr. Lisa for further evaluation of decompensated CHF with DARRIN revealing severe MR.  Admission CXR revealed R pleural effusion which was confirmed with bedside ultrasound and R pigtail catheter was placed at bedside, initially draining 1500cc with minimal drainage after. He continued to be medically optimized and was noted to have incidental finding of pancreatic lesion on CT C/A/P on 10/17, for which tumor markers were obtained and reviewed by Dr. Lisa and GI team, no indication for intervention this admission.  After review of imaging and pre-procedural labwork and in consideration of the patient's multiple comorbidities with associated high risk of morbidity/mortality with procedural intervention, he was deemed a non-procedural candidate and was transitioned to medical management for care.  Dr. Waqas Gibbons, Heart Failure team, was consulted and she will see the patient in outpatient follow-up, which was approved by Dr. Lisa.  On 11/10/17, he is progressing well and endorses his desire to go home today.  Chest tube evaluated at bedside, minimal drainage and no air leak, removed per Dr. Lisa.  He received HD in AM and was medically cleared for discharge to home afterward. He has been in chronic Afib from prior to his admission and has remained on daily amiodarone.  Per Dr. Lisa, he is not a candidate for anticoagulation as he has chronic thrombocytopenia with history of fall c/b subdural earlier this year. Medications and instructions were confirmed and discussed with patient prior to discharge.      Vital Signs Last 24 Hrs  T(C): 36.7 (10 Nov 2017 10:45), Max: 37.8 (09 Nov 2017 17:43)  T(F): 98.1 (10 Nov 2017 10:45), Max: 100 (09 Nov 2017 17:43)  HR: 89 (10 Nov 2017 10:45) (89 - 110)  BP: 98/58 (10 Nov 2017 10:45) (90/55 - 107/63)  BP(mean): 81 (10 Nov 2017 08:28) (68 - 82)  RR: 19 (10 Nov 2017 10:45) (16 - 19)  SpO2: 100% (10 Nov 2017 10:45) (95% - 100%)  I&O's Detail    09 Nov 2017 07:01  -  10 Nov 2017 07:00  --------------------------------------------------------  IN:    IV PiggyBack: 150 mL    Oral Fluid: 290 mL  Total IN: 440 mL    OUT:    Chest Tube: 20 mL    Voided: 25 mL  Total OUT: 45 mL    Total NET: 395 mL      10 Nov 2017 07:01  -  10 Nov 2017 13:02  --------------------------------------------------------  IN:    Oral Fluid: 100 mL  Total IN: 100 mL    OUT:  Total OUT: 0 mL    Total NET: 100 mL      EPICARDIAL WIRES REMOVED: NA  TIE DOWNS REMOVED: NA.    PHYSICAL EXAM:    General: Resting comfortably in bed.  Appears cachectic but in no acute distress.     Neurological: AAOx3, no AMS or focal deficits.     Cardiovascular: Irregularly irregular, S1/S2, +Murmur at apex, no r/g.     Respiratory: No acute distress on RA.  CTA b/l, no w/r/r.     Gastrointestinal: ND, NBS, non-TTP.    Extremities: Warm and well perfused.  Noted multiple areas of small excoriations on anterior aspect of b/l LE and UE, no active bleeding or purulence appreciated.  No edema or calf ttp b/l.     Vascular: Pulses 2+    Incision Sites: N/A    LABS:                        8.8    4.1   )-----------( 65       ( 10 Nov 2017 11:01 )             28.5       COUMADIN: No.        11-10    134<L>  |  92<L>  |  35<H>  ----------------------------<  84  4.9   |  24  |  7.11<H>    Ca    8.2<L>      10 Nov 2017 11:01  Mg     2.0     11-10      MEDICATIONS  (STANDING):  I will START or STAY ON the medications listed below when I get home from the hospital:    aspirin 81 mg oral tablet  -- 1 tab(s) by mouth once a day  -- Indication: For Blood thinner    acetaminophen 325 mg oral tablet  -- 1 tab(s) by mouth every 6 hours, As Needed -Moderate Pain (4 - 6) MDD:4  -- Indication: For Pain    amiodarone 200 mg oral tablet  -- 1 tab(s) by mouth once a day  -- Indication: For Heart rate control    Lipitor 40 mg oral tablet  -- 1 tab(s) by mouth once a day  -- Indication: For Cholesterol    midodrine 10 mg oral tablet  -- 1 tab(s) by mouth 3 times a day  -- Indication: For Blood pressure.     I will STOP taking the medications listed below when I get home from the hospital:  None.     I will SWITCH the dose or number of times a day I take the medications listed below when I get home from the hospital:  None.    Discharge CXR:  < from: Xray Chest 1 View AP -PORTABLE-Routine (11.09.17 @ 05:56) >    EXAM:  XR CHEST 1 VIEW PORT ROUTINE                          PROCEDURE DATE:  11/09/2017         INTERPRETATION:    PORTABLE CHEST X-RAY    Indication: pre-op    Bedside examination of the chest dated 11/9/2017 5:56 AM is compared to   the previous study of 11/8/2017.    Findings: Again status post valve replacement surgery. Right-sided chest   tube remains in place. No pneumothorax. There is again small, partially   loculated right pleural effusion. No change small left pleural effusion   and atelectasis left base. Mild congestion.    Impression: No significant change.    < end of copied text >      Discharge ECHO:  < from: DARRIN w/Doppler (11.07.17 @ 17:10) >  Interpretation Summary  Patient was tachycardic during the study. Severe segmental left   ventricular   dysfunction with akinesis of inferoseptum.The left ventricular ejection   fraction is severely reduced.The left ventricular ejection fraction is   30%.The   right ventricle is dilated.The right ventricular systolic function is   moderately reduced.The left atrium is severely dilated.No clot seen in   the   left atrium or in the left atrial appendage.No evidence for interatrial   shunt   by color Doppler assessment.The right atrium is dilated.There is a   bioprosthetic aortic valve.Mild to moderate paravalvular aortic   regurgitation.The peak pressure gradient is 10 mmHg.The mean pressure   gradient   is 5 mmHg.Mitral annular calcification noted.The posterior leaflet of the   mitral valve appears restricted. Multiplane 3D reconstruction reveals a   MV   area of 6 sqcm at leaflet tips.  There is severe mitral regurgitation.The   effective regurgitant orifice, calculated by the PISA method, is 0.6   cm2The   regurgitant volume, based on the PISA calculation, is 75 ml.Structurally   normal tricuspid valve.There is mild tricuspid regurgitation.There was   insufficient TR detected from which to calculate pulmonary artery   systolic   pressure.  Structurally normal pulmonic valve.There is trace pulmonic   regurgitation.There is no pericardial effusion.Bilateral pleural effusion   noted.Severe, non-mobile plaque is seen in the visualized portions of the   aorta and aortic arch.    < end of copied text >

## 2017-11-10 NOTE — DISCHARGE NOTE ADULT - PLAN OF CARE
Recover from hospital stay -Please follow up with Dr. Lisa on 11/22/17 at 1:30pm. The office is located at [address] [place] [address], [facility] [address]; [place] [address].  Phone Number: #[phone]    -Walk daily as tolerated and use your incentive spirometer every hour.    -No driving or strenuous activity/exercise for 6 weeks, or until  cleared by your surgeon.    -Gently clean your incisions with anti-bacterial soap and water, pat  dry.  You may leave them open to air.    -Call your doctor if you have shortness of breath, chest pain not  relieved by pain medication, dizziness, fever >101.5, or increased  redness or drainage from incisions. Additional follow-up appointments -You have been referred to Dr. Waqas Gibbons for further evaluation of your heart failure and treatment.  Her office will contact you within the coming days to schedule an outpatient appointment with you.  If you have not heard from her office by 11/14/17, please contact her office at the number listed in your discharge paperwork. -You have been referred to Dr. Waqas Gibbons for further evaluation of your heart failure and treatment.  Her office will contact you within the coming days to schedule an outpatient appointment with you.  If you have not heard from her office by 11/14/17, please contact her office at the number listed in your discharge paperwork.    -As discussed with the Electrophysiology team, Dr. Angel, you should follow-up with your cardiologist, Dr. Jacobson in [place] for further care of your Atrial fibrillation.  Please call his office on 11/13/17 to schedule an appointment within 2-4 weeks.

## 2017-11-10 NOTE — DISCHARGE NOTE ADULT - COMMUNITY RESOURCES
Return to HD Monday 11/13/17: [facility], [facility]      [address] [place] [address]     [phone]

## 2017-11-10 NOTE — PROGRESS NOTE ADULT - SUBJECTIVE AND OBJECTIVE BOX
Patient is a 62y Male seen and evaluated at bedside. Patient lying in bed in no acute distress at present. Denies any chest pain, shortness of breath at present. Denies any other complaints at present.  Right chest tube present.       acetaminophen   Tablet. 650 every 6 hours PRN  albumin human 25% IVPB 50 every 1 hour PRN  albumin human 25% IVPB 50 every 1 hour PRN  albumin human 25% IVPB 50 every 1 hour PRN  amiodarone    Tablet 200 daily  aspirin enteric coated 81 daily  atorvastatin 40 at bedtime  epoetin thor Injectable 36759 once  lidocaine   Patch 1 daily  midodrine 10 three times a day  morphine  - Injectable 1 once  oxyCODONE    5 mG/acetaminophen 325 mG 2 every 6 hours PRN  pantoprazole    Tablet 40 before breakfast  sodium chloride 0.9% lock flush 3 every 8 hours      Allergies    No Known Allergies    Intolerances        T(C): , Max: 37.8 (11-09-17 @ 17:43)  T(F): , Max: 100 (11-09-17 @ 17:43)  HR: 89 (11-10-17 @ 10:45)  BP: 98/58 (11-10-17 @ 10:45)  BP(mean): 81 (11-10-17 @ 08:28)  RR: 19 (11-10-17 @ 10:45)  SpO2: 100% (11-10-17 @ 10:45)  Wt(kg): --    11-09 @ 07:01  -  11-10 @ 07:00  --------------------------------------------------------  IN: 440 mL / OUT: 45 mL / NET: 395 mL    11-10 @ 07:01  -  11-10 @ 11:27  --------------------------------------------------------  IN: 100 mL / OUT: 0 mL / NET: 100 mL          Review of Systems:  CONSTITUTIONAL: No fever or chills, No fatigue or tiredness.  EYES: No blurred or double vision.  RESPIRATORY: No shortness of breath, cough, hemoptysis  CARDIOVASCULAR: No Chest pain or shortness of breath  GASTROINTESTINAL: NO abdominal or flank pain, No nausea or vomiting, No diarrhea  GENITOURINARY: No dysuria or urinary burning, No difficulty passing urine, No hematuria  NEUROLOGICAL: No headaches or blurred vision  SKIN: No skin rashes   MUSCULOSKELETAL: No arthralgia, Joint pain, leg edema, No muscle pains      PHYSICAL EXAM:  GENERAL: NAD, well-developed, well nourished, alert, awake, no acute distress at present  HEAD:  Atraumatic, Normocephalic,   EYES: Bilateral conjuctiva and scleral pallor+nt  Oral cavity: Oral mucosa dry and pale  NECK: Neck supple, No JVD  CHEST/LUNG: Bilateral decreased breath sounds, Right>left, Bibasilar minimal rales and crepitations, NO wheezing or rhonchi, Right chest tube+nt  HEART: Regular rate and rhythm. ELEONORA II/VI at LPSB, No gallop, no rub   ABDOMEN: Soft, Nontender, BS+nt, No flank tenderness.   EXTREMITIES: No clubbing, cyanosis, or edema  Neurology: AAOx3, no focal neurological deficit  SKIN: No rashes or lesions          ACCESS: Left upper extremity AV fistula+nt    LABS:                        8.8    4.1   )-----------( 65       ( 10 Nov 2017 11:01 )             28.5     11-09    141  |  98  |  26<H>  ----------------------------<  47<L>  5.0   |  27  |  5.70<H>    Ca    8.0<L>      09 Nov 2017 06:54  Mg     2.0     11-09                  RADIOLOGY & ADDITIONAL STUDIES:  < from: US Duplex Carotid Arteries Complete, Bilateral (11.09.17 @ 16:55) >    EXAM:  US DPLX CAROTIDS COMPL BI                          PROCEDURE DATE:  11/09/2017                     INTERPRETATION:        CAROTID ARTERIAL DUPLEX DOPPLER ULTRASOUND Performed 11/9/2017 4:55 PM    HISTORY: Pre cath lab evaluation.    TECHNIQUE:  Duplex Doppler evaluation including gray-scale ultrasound   imaging, color flow Doppler imaging, and Doppler spectral analysis of the   right and left common, internal, and external carotid arteries was   performed.    PRIOR STUDIES:  None.    FINDINGS: Images of the carotid arteries demonstrate moderate to severe   calcified plaque bilateral common carotid arteries, bilateral carotid   bulbs, bilateral proximal internal carotid arteries.    The peak systolic velocities (PSV) and end-diastolicvelocities (EDV) are   as follows:      RIGHT CAROTID:    CCA: PSV = 80 cm/sec;  EDV = 12 cm/sec    ICA: PSV = 136 cm/sec;  EDV = 22 cm/sec  Increased velocity and spectral broadening.  (Normal ICA PSV < 125 cm/sec; Normal ICA EDV <40 cm/sec)    ECA:PSV = 89 cm/sec;  EDV = 9 cm/sec                           LEFT CAROTID:    CCA: PSV = 66 cm/sec;  EDV = 15 cm/sec    ICA: PSV = 122 cm/sec;  EDV = 33 cm/sec    (Normal ICA PSV < 125 cm/sec; Normal ICA EDV <40 cm/sec)    ECA: PSV = 87 cm/sec;  EDV = 18 cm/sec      VERTEBRAL ARTERIES:   Antegrade flow was seen within both vertebral arteries.      IMPRESSION:  Moderate to severe atherosclerotic changes of bilateral carotid arteries.  50-69% stenosis of the right internal carotid artery.  No hemodynamically significant left carotid artery stenosis.                    "Thank you for the opportunity to participate in the care of this   patient."    LAVON LOPEZ M.D., RADIOLOGY RESIDENT  This document has been electronically signed.  ROBERTO JIN M.D., ATTENDING RADIOLOGIST  This document has been electronically signed. Nov 10 2017  8:26AM                  < end of copied text >    < from: Xray Chest 1 View AP -PORTABLE-Routine (11.09.17 @ 05:56) >    EXAM:  XR CHEST 1 VIEW PORT ROUTINE                          PROCEDURE DATE:  11/09/2017                     INTERPRETATION:    PORTABLE CHEST X-RAY    Indication: pre-op    Bedside examination of the chest dated 11/9/2017 5:56 AM is compared to   the previous study of 11/8/2017.    Findings: Again status post valve replacement surgery. Right-sided chest   tube remains in place. No pneumothorax. There is again small, partially   loculated right pleural effusion. No change small left pleural effusion   and atelectasis left base. Mild congestion.    Impression: No significant change.            "Thank you for the opportunity to participate in the care of this   patient."        NIDA SURESH M.D., ATTENDING RADIOLOGIST  This document has been electronically signed. Nov 9 2017  8:35AM                  < end of copied text >

## 2017-11-10 NOTE — PROGRESS NOTE ADULT - ASSESSMENT
62 M with ESRD on HD via L AV fistula (M/W/F) secondary to IGA nephropathy s/p right nephrectomy and left partial nephrectomy, Thrombocytopenia, JADA, HTN, AFib (on Amio, no a/c due to thrombocytopenia), CAD / AV disease s/p CABG and AVR in 02/2017 c/b refractory heart failure s/p aortic PVL at [facility] in June 2017 p/w CHF. DARRIN showed severe MR, EF 30% and LA severely dilated. He was deemed not a procedural candidate due to frailty and high mortality / morbidity rate.   - Continue Amio 200 mg daily.  Re-emphasized to him to have yearly eye, thyroid, liver and lung follow up while on Amio. He states that he has been on Amio for a long time.   - Currently his AFIB VR is fairly controlled with HR < 110 bpm.  Limited with rate controlling agent secondary to hypotension and renal disease.   - He has a cardiologist at [place] that he has been following for afib (Dr. Keshav Jacobson). he is not willing to come to the [facility] for AFIB care, which is understandable.

## 2017-11-10 NOTE — PROGRESS NOTE ADULT - ATTENDING COMMENTS
seen and evaluated on HD, hemodynamically stable on midodrine, epo for anemia, 1.5L UF
tolerated HD, calcijex for hypocalcemia and secondary hyperparathyroidism, epo for anemia
Severe MR with euvolemia and hemodynamicaly stable with chronic bp 's on midodrine for hemodynamic support chronically, initially adm for MVR however cards team decided better approach was medical management. Chest tube R for pleural effusions, cytology pending, minimal output. Anemia improving with epo, no Fe 2/2 AOCD
Severe MR with euvolemia and hemodynamicaly stable with chronic bp 's, team opted for medical management. Hb improving with epo, no Fe 2/2 AOCD, hyperkalemia improved with HD, low K diet
euvolemic, lytes stable, tolerated HD, htn well controlled pending MV repair

## 2017-11-10 NOTE — PROGRESS NOTE ADULT - PROBLEM SELECTOR PLAN 2
Anemia of chronic renal disease with hemoglobin 8.8 at present.  Not iron deficient. T sat% 22, Ferritin-1285   Will do EPO during  HD treatment

## 2017-11-10 NOTE — PROGRESS NOTE ADULT - PROBLEM SELECTOR PLAN 1
ESRD on HD( MWF) through LUE AV fistula.  Last HD session on 11/08 at his HD center with UF 0.5L  Will do HD treatment today as per ordered.  Low K/Renal/Low phos diet.  No ACEI/ARB for now.

## 2017-11-10 NOTE — DISCHARGE NOTE ADULT - CARE PROVIDER_API CALL
Kelvin Lisa), Cardiology; Internal Medicine; Interventional Cardiology  [address]  [address] [place] [address]  Phone: [phone]  Fax: [phone]    Waqas Gibbons (MD; PhD), Adv Heart Fail Trnsplnt Cardio; Cardiovascular Disease; Internal Medicine  [address] [place] [address]  Phone: [phone]  Fax: [phone]

## 2017-11-10 NOTE — PROGRESS NOTE ADULT - ASSESSMENT
Patient is a 62 year old male with history of ESRD on HD last HD today via L AV fistula (M/W/F), CAD / AV disease s/p CABG and AVR in 02/2017 c/b refractory heart failure s/p aortic PVL at [facility] in June 2017 being admitted today (Transferred from OSH.) Nephrology consult called for ESRD on HD treatment. Patient with interval placement of Right chest tube for pleural effusion. Patient denies any chest pain, palpitations, dizziness at present. Discuss with CT surgery PA as per no plan for surgery for now.

## 2017-11-10 NOTE — DISCHARGE NOTE ADULT - HOSPITAL COURSE
This is a 62y M with history of ESRD on HD via L AV fistula (M/W/F), CAD / AV disease s/p CABG/AVR in 2/2017 c/b refractory heart failure treated by aortic PVL in 6/2017.  He presented to OSH with worsening SOB and was admitted to [facility] on 11/6/17 under the care of Dr. Lisa for further evaluation of decompensated CHF with DARRIN revealing severe MR.  Admission CXR revealed R pleural effusion which was confirmed with bedside ultrasound and R pigtail catheter was placed at bedside, initially draining 1500cc with minimal drainage after. He continued to be medically optimized and was noted to have incidental finding of pancreatic lesion on CT C/A/P on 10/17, for which tumor markers were obtained and reviewed by Dr. Lisa and GI team, no indication for intervention this admission.  After review of imaging and pre-procedural labwork and in consideration of the patient's multiple comorbidities with associated high risk of morbidity/mortality with procedural intervention, he was deemed a non-procedural candidate and was transitioned to medical management for care.  Dr. Waqas iGbbons, Heart Failure team, was consulted and she will see the patient in outpatient follow-up, which was approved by Dr. Lisa.  On 11/10/17, he is progressing well and endorses his desire to go home today.  Chest tube evaluated at bedside, minimal drainage and no air leak, removed per Dr. Lisa.  He received HD in AM and was medically cleared for discharge to home afterward.  Medications and instructions were confirmed and discussed with patient prior to discharge. This is a 62y M with history of ESRD on HD via L AV fistula (M/W/F), CAD / AV disease s/p CABG/AVR in 2/2017 c/b refractory heart failure treated by aortic PVL in 6/2017.  He presented to OSH with worsening SOB and was admitted to [facility] on 11/6/17 under the care of Dr. Lisa for further evaluation of decompensated CHF with DARRIN revealing severe MR.  Admission CXR revealed R pleural effusion which was confirmed with bedside ultrasound and R pigtail catheter was placed at bedside, initially draining 1500cc with minimal drainage after. He continued to be medically optimized and was noted to have incidental finding of pancreatic lesion on CT C/A/P on 10/17, for which tumor markers were obtained and reviewed by Dr. Lisa and GI team, no indication for intervention this admission.  After review of imaging and pre-procedural labwork and in consideration of the patient's multiple comorbidities with associated high risk of morbidity/mortality with procedural intervention, he was deemed a non-procedural candidate and was transitioned to medical management for care.  Dr. Waqas Gibbons, Heart Failure team, was consulted and she will see the patient in outpatient follow-up, which was approved by Dr. Lisa.  On 11/10/17, he is progressing well and endorses his desire to go home today.  Chest tube evaluated at bedside, minimal drainage and no air leak, removed per Dr. Lisa.  He received HD in AM and was medically cleared for discharge to home afterward. He has been in chronic Afib from prior to his admission and has remained on daily amiodarone.  Per Dr. Lisa, he is not a candidate for anticoagulation as he has chronic thrombocytopenia with history of fall c/b subdural earlier this year. Medications and instructions were confirmed and discussed with patient prior to discharge.

## 2017-11-10 NOTE — PROGRESS NOTE ADULT - PROBLEM SELECTOR PLAN 4
Patient with Calcium level 8.0, phosphorus level 5.1 at present on Sensipar.  Obtain ionized calcium, Intact PTH, Vitamin D25 and Vitamin D1, 25 level.  Will give calcijex during HD.  Low phosphorus diet.

## 2017-11-10 NOTE — DISCHARGE NOTE ADULT - MEDICATION SUMMARY - MEDICATIONS TO TAKE
I will START or STAY ON the medications listed below when I get home from the hospital:    aspirin 81 mg oral tablet  -- 1 tab(s) by mouth once a day  -- Indication: For Blood thinner    acetaminophen 325 mg oral tablet  -- 1 tab(s) by mouth every 6 hours, As Needed -Moderate Pain (4 - 6) MDD:4  -- Indication: For Pain    amiodarone 200 mg oral tablet  -- 1 tab(s) by mouth once a day  -- Indication: For Heart rate control    Lipitor 40 mg oral tablet  -- 1 tab(s) by mouth once a day  -- Indication: For Cholesterol    midodrine 10 mg oral tablet  -- 1 tab(s) by mouth 3 times a day  -- Indication: For Blood pressure

## 2017-11-10 NOTE — PROGRESS NOTE ADULT - PROVIDER SPECIALTY LIST ADULT
CT Surgery
CT Surgery
Electrophysiology
Electrophysiology
Nephrology
CT Surgery
CT Surgery

## 2017-11-10 NOTE — PROGRESS NOTE ADULT - SUBJECTIVE AND OBJECTIVE BOX
EPS Progress Note    S: Pt without complaints or overnight event. Awaiting for HD and home today.     O: T(C): 36.7 (11-10-17 @ 10:45), Max: 37.8 (11-09-17 @ 17:43)  HR: 89 (11-10-17 @ 10:45) (89 - 110)  BP: 98/58 (11-10-17 @ 10:45) (90/55 - 107/63)  RR: 19 (11-10-17 @ 10:45) (16 - 19)  SpO2: 100% (11-10-17 @ 10:45) (95% - 100%)  Wt(kg): --    TELE: AFIB with VR 90s - 100s bpm    PHYSICAL  General:  NAD        Chest:  rales. Left chest tube in place  Cardiac:  irregular, + s1/s2 , murmur  Abdomen:  +BS , soft, NT, slight distended  Extremities: No LE edema    LABS:                        8.8    4.1   )-----------( 65       ( 10 Nov 2017 11:01 )             28.5     11-10    134<L>  |  92<L>  |  35<H>  ----------------------------<  84  4.9   |  24  |  7.11<H>    Ca    8.2<L>      10 Nov 2017 11:01  Mg     2.0     11-10          MEDICATIONS:  acetaminophen   Tablet. 650 milliGRAM(s) Oral every 6 hours PRN  albumin human 25% IVPB 50 milliLiter(s) IV Intermittent every 1 hour PRN  albumin human 25% IVPB 50 milliLiter(s) IV Intermittent every 1 hour PRN  albumin human 25% IVPB 50 milliLiter(s) IV Intermittent every 1 hour PRN  amiodarone    Tablet 200 milliGRAM(s) Oral daily  aspirin enteric coated 81 milliGRAM(s) Oral daily  atorvastatin 40 milliGRAM(s) Oral at bedtime  epoetin thor Injectable 76593 Unit(s) IV Push once  lidocaine   Patch 1 Patch Transdermal daily  midodrine 10 milliGRAM(s) Oral three times a day  morphine  - Injectable 1 milliGRAM(s) IV Push once  oxyCODONE    5 mG/acetaminophen 325 mG 2 Tablet(s) Oral every 6 hours PRN  pantoprazole    Tablet 40 milliGRAM(s) Oral before breakfast  sodium chloride 0.9% lock flush 3 milliLiter(s) IV Push every 8 hours

## 2017-11-10 NOTE — PROGRESS NOTE ADULT - PROBLEM SELECTOR PLAN 5
Severe MR with Right pleural effusion s/p Chest tube drain not a surgical candidate as per CT surgery team.   Possible removal of Chest tube  Optimal medical treatment

## 2017-11-10 NOTE — DISCHARGE NOTE ADULT - CARE PROVIDERS DIRECT ADDRESSES
[email or web address]

## 2017-11-15 DIAGNOSIS — D69.6 THROMBOCYTOPENIA, UNSPECIFIED: ICD-10-CM

## 2017-11-15 DIAGNOSIS — Z86.718 PERSONAL HISTORY OF OTHER VENOUS THROMBOSIS AND EMBOLISM: ICD-10-CM

## 2017-11-15 DIAGNOSIS — E83.51 HYPOCALCEMIA: ICD-10-CM

## 2017-11-15 DIAGNOSIS — I50.33 ACUTE ON CHRONIC DIASTOLIC (CONGESTIVE) HEART FAILURE: ICD-10-CM

## 2017-11-15 DIAGNOSIS — D49.0 NEOPLASM OF UNSPECIFIED BEHAVIOR OF DIGESTIVE SYSTEM: ICD-10-CM

## 2017-11-15 DIAGNOSIS — Z95.1 PRESENCE OF AORTOCORONARY BYPASS GRAFT: ICD-10-CM

## 2017-11-15 DIAGNOSIS — I34.0 NONRHEUMATIC MITRAL (VALVE) INSUFFICIENCY: ICD-10-CM

## 2017-11-15 DIAGNOSIS — Z91.81 HISTORY OF FALLING: ICD-10-CM

## 2017-11-15 DIAGNOSIS — I25.10 ATHEROSCLEROTIC HEART DISEASE OF NATIVE CORONARY ARTERY WITHOUT ANGINA PECTORIS: ICD-10-CM

## 2017-11-15 DIAGNOSIS — M10.9 GOUT, UNSPECIFIED: ICD-10-CM

## 2017-11-15 DIAGNOSIS — Z95.2 PRESENCE OF PROSTHETIC HEART VALVE: ICD-10-CM

## 2017-11-15 DIAGNOSIS — Z53.09 PROCEDURE AND TREATMENT NOT CARRIED OUT BECAUSE OF OTHER CONTRAINDICATION: ICD-10-CM

## 2017-11-15 DIAGNOSIS — N18.6 END STAGE RENAL DISEASE: ICD-10-CM

## 2017-11-15 DIAGNOSIS — E87.5 HYPERKALEMIA: ICD-10-CM

## 2017-11-15 DIAGNOSIS — G47.33 OBSTRUCTIVE SLEEP APNEA (ADULT) (PEDIATRIC): ICD-10-CM

## 2017-11-15 DIAGNOSIS — R06.02 SHORTNESS OF BREATH: ICD-10-CM

## 2017-11-15 DIAGNOSIS — D63.1 ANEMIA IN CHRONIC KIDNEY DISEASE: ICD-10-CM

## 2017-11-15 DIAGNOSIS — I13.2 HYPERTENSIVE HEART AND CHRONIC KIDNEY DISEASE WITH HEART FAILURE AND WITH STAGE 5 CHRONIC KIDNEY DISEASE, OR END STAGE RENAL DISEASE: ICD-10-CM

## 2017-11-28 ENCOUNTER — INPATIENT (INPATIENT)
Facility: HOSPITAL | Age: 62
LOS: 0 days | Discharge: HOME | End: 2017-11-29
Attending: INTERNAL MEDICINE | Admitting: INTERNAL MEDICINE

## 2017-11-28 DIAGNOSIS — Z90.5 ACQUIRED ABSENCE OF KIDNEY: Chronic | ICD-10-CM

## 2017-11-28 DIAGNOSIS — R07.9 CHEST PAIN, UNSPECIFIED: ICD-10-CM

## 2017-11-28 DIAGNOSIS — R07.89 OTHER CHEST PAIN: ICD-10-CM

## 2017-11-28 DIAGNOSIS — D64.9 ANEMIA, UNSPECIFIED: ICD-10-CM

## 2017-11-28 DIAGNOSIS — Z95.1 PRESENCE OF AORTOCORONARY BYPASS GRAFT: Chronic | ICD-10-CM

## 2017-11-28 DIAGNOSIS — Z90.49 ACQUIRED ABSENCE OF OTHER SPECIFIED PARTS OF DIGESTIVE TRACT: Chronic | ICD-10-CM

## 2017-11-28 DIAGNOSIS — I50.9 HEART FAILURE, UNSPECIFIED: ICD-10-CM

## 2017-11-28 DIAGNOSIS — R57.0 CARDIOGENIC SHOCK: ICD-10-CM

## 2017-11-28 DIAGNOSIS — R55 SYNCOPE AND COLLAPSE: ICD-10-CM

## 2017-11-28 DIAGNOSIS — Z95.2 PRESENCE OF PROSTHETIC HEART VALVE: Chronic | ICD-10-CM

## 2017-11-28 DIAGNOSIS — I34.0 NONRHEUMATIC MITRAL (VALVE) INSUFFICIENCY: ICD-10-CM

## 2017-11-28 DIAGNOSIS — Z98.890 OTHER SPECIFIED POSTPROCEDURAL STATES: Chronic | ICD-10-CM

## 2017-11-28 DIAGNOSIS — I77.0 ARTERIOVENOUS FISTULA, ACQUIRED: Chronic | ICD-10-CM

## 2017-11-28 DIAGNOSIS — N18.6 END STAGE RENAL DISEASE: ICD-10-CM

## 2017-12-03 ENCOUNTER — INPATIENT (INPATIENT)
Facility: HOSPITAL | Age: 62
LOS: 5 days | End: 2017-12-09
Attending: INTERNAL MEDICINE | Admitting: INTERNAL MEDICINE

## 2017-12-03 DIAGNOSIS — N18.6 END STAGE RENAL DISEASE: ICD-10-CM

## 2017-12-03 DIAGNOSIS — I77.0 ARTERIOVENOUS FISTULA, ACQUIRED: Chronic | ICD-10-CM

## 2017-12-03 DIAGNOSIS — R55 SYNCOPE AND COLLAPSE: ICD-10-CM

## 2017-12-03 DIAGNOSIS — R07.9 CHEST PAIN, UNSPECIFIED: ICD-10-CM

## 2017-12-03 DIAGNOSIS — Z90.49 ACQUIRED ABSENCE OF OTHER SPECIFIED PARTS OF DIGESTIVE TRACT: Chronic | ICD-10-CM

## 2017-12-03 DIAGNOSIS — R07.89 OTHER CHEST PAIN: ICD-10-CM

## 2017-12-03 DIAGNOSIS — I50.9 HEART FAILURE, UNSPECIFIED: ICD-10-CM

## 2017-12-03 DIAGNOSIS — D64.9 ANEMIA, UNSPECIFIED: ICD-10-CM

## 2017-12-03 DIAGNOSIS — Z95.1 PRESENCE OF AORTOCORONARY BYPASS GRAFT: Chronic | ICD-10-CM

## 2017-12-03 DIAGNOSIS — Z98.890 OTHER SPECIFIED POSTPROCEDURAL STATES: Chronic | ICD-10-CM

## 2017-12-03 DIAGNOSIS — R57.0 CARDIOGENIC SHOCK: ICD-10-CM

## 2017-12-03 DIAGNOSIS — Z90.5 ACQUIRED ABSENCE OF KIDNEY: Chronic | ICD-10-CM

## 2017-12-03 DIAGNOSIS — Z95.2 PRESENCE OF PROSTHETIC HEART VALVE: Chronic | ICD-10-CM

## 2017-12-03 DIAGNOSIS — I34.0 NONRHEUMATIC MITRAL (VALVE) INSUFFICIENCY: ICD-10-CM

## 2017-12-05 DIAGNOSIS — E87.70 FLUID OVERLOAD, UNSPECIFIED: ICD-10-CM

## 2017-12-05 DIAGNOSIS — I48.0 PAROXYSMAL ATRIAL FIBRILLATION: ICD-10-CM

## 2017-12-05 DIAGNOSIS — E83.39 OTHER DISORDERS OF PHOSPHORUS METABOLISM: ICD-10-CM

## 2017-12-05 DIAGNOSIS — M10.9 GOUT, UNSPECIFIED: ICD-10-CM

## 2017-12-05 DIAGNOSIS — R07.9 CHEST PAIN, UNSPECIFIED: ICD-10-CM

## 2017-12-05 DIAGNOSIS — I95.89 OTHER HYPOTENSION: ICD-10-CM

## 2017-12-05 DIAGNOSIS — N02.8 RECURRENT AND PERSISTENT HEMATURIA WITH OTHER MORPHOLOGIC CHANGES: ICD-10-CM

## 2017-12-05 DIAGNOSIS — Z91.15 PATIENT'S NONCOMPLIANCE WITH RENAL DIALYSIS: ICD-10-CM

## 2017-12-05 DIAGNOSIS — R29.6 REPEATED FALLS: ICD-10-CM

## 2017-12-05 DIAGNOSIS — D63.1 ANEMIA IN CHRONIC KIDNEY DISEASE: ICD-10-CM

## 2017-12-05 DIAGNOSIS — I50.9 HEART FAILURE, UNSPECIFIED: ICD-10-CM

## 2017-12-05 DIAGNOSIS — Z99.2 DEPENDENCE ON RENAL DIALYSIS: ICD-10-CM

## 2017-12-05 DIAGNOSIS — I25.10 ATHEROSCLEROTIC HEART DISEASE OF NATIVE CORONARY ARTERY WITHOUT ANGINA PECTORIS: ICD-10-CM

## 2017-12-05 DIAGNOSIS — I25.2 OLD MYOCARDIAL INFARCTION: ICD-10-CM

## 2017-12-05 DIAGNOSIS — I34.0 NONRHEUMATIC MITRAL (VALVE) INSUFFICIENCY: ICD-10-CM

## 2017-12-05 DIAGNOSIS — I13.2 HYPERTENSIVE HEART AND CHRONIC KIDNEY DISEASE WITH HEART FAILURE AND WITH STAGE 5 CHRONIC KIDNEY DISEASE, OR END STAGE RENAL DISEASE: ICD-10-CM

## 2017-12-05 DIAGNOSIS — N18.6 END STAGE RENAL DISEASE: ICD-10-CM

## 2017-12-05 DIAGNOSIS — Z95.1 PRESENCE OF AORTOCORONARY BYPASS GRAFT: ICD-10-CM

## 2017-12-05 DIAGNOSIS — R63.4 ABNORMAL WEIGHT LOSS: ICD-10-CM

## 2017-12-15 ENCOUNTER — APPOINTMENT (OUTPATIENT)
Dept: HEART AND VASCULAR | Facility: CLINIC | Age: 62
End: 2017-12-15

## 2017-12-15 DIAGNOSIS — R57.1 HYPOVOLEMIC SHOCK: ICD-10-CM

## 2017-12-15 DIAGNOSIS — Z86.74 PERSONAL HISTORY OF SUDDEN CARDIAC ARREST: ICD-10-CM

## 2017-12-15 DIAGNOSIS — N18.6 END STAGE RENAL DISEASE: ICD-10-CM

## 2017-12-15 DIAGNOSIS — E83.39 OTHER DISORDERS OF PHOSPHORUS METABOLISM: ICD-10-CM

## 2017-12-15 DIAGNOSIS — K86.9 DISEASE OF PANCREAS, UNSPECIFIED: ICD-10-CM

## 2017-12-15 DIAGNOSIS — I47.2 VENTRICULAR TACHYCARDIA: ICD-10-CM

## 2017-12-15 DIAGNOSIS — Z90.5 ACQUIRED ABSENCE OF KIDNEY: ICD-10-CM

## 2017-12-15 DIAGNOSIS — Z78.1 PHYSICAL RESTRAINT STATUS: ICD-10-CM

## 2017-12-15 DIAGNOSIS — Z51.5 ENCOUNTER FOR PALLIATIVE CARE: ICD-10-CM

## 2017-12-15 DIAGNOSIS — R00.1 BRADYCARDIA, UNSPECIFIED: ICD-10-CM

## 2017-12-15 DIAGNOSIS — I48.91 UNSPECIFIED ATRIAL FIBRILLATION: ICD-10-CM

## 2017-12-15 DIAGNOSIS — E16.1 OTHER HYPOGLYCEMIA: ICD-10-CM

## 2017-12-15 DIAGNOSIS — B37.0 CANDIDAL STOMATITIS: ICD-10-CM

## 2017-12-15 DIAGNOSIS — T82.03XA LEAKAGE OF HEART VALVE PROSTHESIS, INITIAL ENCOUNTER: ICD-10-CM

## 2017-12-15 DIAGNOSIS — R65.20 SEVERE SEPSIS WITHOUT SEPTIC SHOCK: ICD-10-CM

## 2017-12-15 DIAGNOSIS — I25.10 ATHEROSCLEROTIC HEART DISEASE OF NATIVE CORONARY ARTERY WITHOUT ANGINA PECTORIS: ICD-10-CM

## 2017-12-15 DIAGNOSIS — Z66 DO NOT RESUSCITATE: ICD-10-CM

## 2017-12-15 DIAGNOSIS — Z95.1 PRESENCE OF AORTOCORONARY BYPASS GRAFT: ICD-10-CM

## 2017-12-15 DIAGNOSIS — E43 UNSPECIFIED SEVERE PROTEIN-CALORIE MALNUTRITION: ICD-10-CM

## 2017-12-15 DIAGNOSIS — I95.1 ORTHOSTATIC HYPOTENSION: ICD-10-CM

## 2017-12-15 DIAGNOSIS — J96.00 ACUTE RESPIRATORY FAILURE, UNSPECIFIED WHETHER WITH HYPOXIA OR HYPERCAPNIA: ICD-10-CM

## 2017-12-15 DIAGNOSIS — I13.2 HYPERTENSIVE HEART AND CHRONIC KIDNEY DISEASE WITH HEART FAILURE AND WITH STAGE 5 CHRONIC KIDNEY DISEASE, OR END STAGE RENAL DISEASE: ICD-10-CM

## 2017-12-15 DIAGNOSIS — Z99.2 DEPENDENCE ON RENAL DIALYSIS: ICD-10-CM

## 2017-12-15 DIAGNOSIS — R18.8 OTHER ASCITES: ICD-10-CM

## 2017-12-15 DIAGNOSIS — A41.9 SEPSIS, UNSPECIFIED ORGANISM: ICD-10-CM
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
## 2017-12-15 DIAGNOSIS — I50.84 END STAGE HEART FAILURE: ICD-10-CM

## 2017-12-15 DIAGNOSIS — Z90.49 ACQUIRED ABSENCE OF OTHER SPECIFIED PARTS OF DIGESTIVE TRACT: ICD-10-CM

## 2017-12-15 DIAGNOSIS — I50.22 CHRONIC SYSTOLIC (CONGESTIVE) HEART FAILURE: ICD-10-CM

## 2018-01-03 RX ORDER — ASPIRIN/CALCIUM CARB/MAGNESIUM 324 MG
1 TABLET ORAL
Qty: 0 | Refills: 0 | COMMUNITY

## 2018-01-03 RX ORDER — ATORVASTATIN CALCIUM 80 MG/1
1 TABLET, FILM COATED ORAL
Qty: 0 | Refills: 0 | COMMUNITY

## 2018-01-03 RX ORDER — MIDODRINE HYDROCHLORIDE 2.5 MG/1
1 TABLET ORAL
Qty: 0 | Refills: 0 | COMMUNITY

## 2018-01-03 RX ORDER — AMIODARONE HYDROCHLORIDE 400 MG/1
1 TABLET ORAL
Qty: 0 | Refills: 0 | COMMUNITY

## 2018-01-03 RX ORDER — CINACALCET 30 MG/1
1 TABLET, FILM COATED ORAL
Qty: 0 | Refills: 0 | COMMUNITY

## 2018-01-03 RX ORDER — TAMSULOSIN HYDROCHLORIDE 0.4 MG/1
1 CAPSULE ORAL
Qty: 0 | Refills: 0 | COMMUNITY

## 2018-02-02 DIAGNOSIS — I48.91 UNSPECIFIED ATRIAL FIBRILLATION: ICD-10-CM

## 2018-02-02 DIAGNOSIS — Z79.01 LONG TERM (CURRENT) USE OF ANTICOAGULANTS: ICD-10-CM

## 2018-02-02 DIAGNOSIS — Z02.9 ENCOUNTER FOR ADMINISTRATIVE EXAMINATIONS, UNSPECIFIED: ICD-10-CM

## 2018-04-24 NOTE — PATIENT PROFILE ADULT. - AS SC BRADEN ACTIVITY
Addended by: ZACHARIAH HINOJOSA on: 4/24/2018 03:31 PM     Modules accepted: Orders    
(3) walks occasionally

## 2021-08-12 NOTE — PROCEDURE NOTE - AMOUNT OF FLUID OBTAINED
1000
I have personally provided the amount of critical care time documented below concurrently with the resident/fellow.  This time excludes time spent on separate procedures and time spent teaching. I have reviewed the resident’s / fellow’s documentation and I agree with the history, exam, and assessment and plan of care.

## 2022-01-01 NOTE — PROGRESS NOTE ADULT - PROBLEM SELECTOR PROBLEM 2
Atrial fibrillation
ESRD (end stage renal disease)
HTN (hypertension)
Statement Selected

## 2023-04-14 NOTE — PROGRESS NOTE ADULT - EXTREMITIES
Verbal shift change report given to Hung Stark RN (oncoming nurse) by Rossana Hairston RN (offgoing nurse). Report included the following information SBAR, Intake/Output, and MAR. detailed exam

## 2024-09-10 NOTE — DIETITIAN INITIAL EVALUATION ADULT. - WEIGHT IN KG
NEXT STEPS    Obtain lab work    Hold Januvia and loperamide on morning of surgery    Hold blood thinning medicines such as Aspirin, Ibuprofen, Aleve, fish oil, vitamins a week prior to surgery   61.2 80.7